# Patient Record
Sex: MALE | Race: WHITE | Employment: FULL TIME | URBAN - METROPOLITAN AREA
[De-identification: names, ages, dates, MRNs, and addresses within clinical notes are randomized per-mention and may not be internally consistent; named-entity substitution may affect disease eponyms.]

---

## 2019-10-31 ENCOUNTER — HOSPITAL ENCOUNTER (OUTPATIENT)
Age: 59
Discharge: HOME | End: 2019-11-02

## 2019-10-31 PROCEDURE — 88305 TISSUE EXAM BY PATHOLOGIST: CPT

## 2019-10-31 PROCEDURE — 88112 CYTOPATH CELL ENHANCE TECH: CPT

## 2019-12-23 ENCOUNTER — HOSPITAL ENCOUNTER (EMERGENCY)
Age: 59
Discharge: HOME | End: 2019-12-23
Attending: EMERGENCY MEDICINE
Payer: COMMERCIAL

## 2019-12-23 ENCOUNTER — APPOINTMENT (OUTPATIENT)
Dept: GENERAL RADIOLOGY | Age: 59
End: 2019-12-23
Payer: COMMERCIAL

## 2019-12-23 ENCOUNTER — APPOINTMENT (OUTPATIENT)
Dept: CT IMAGING | Age: 59
End: 2019-12-23
Payer: COMMERCIAL

## 2019-12-23 VITALS
TEMPERATURE: 97.1 F | BODY MASS INDEX: 35 KG/M2 | HEIGHT: 71 IN | SYSTOLIC BLOOD PRESSURE: 130 MMHG | RESPIRATION RATE: 16 BRPM | OXYGEN SATURATION: 96 % | DIASTOLIC BLOOD PRESSURE: 80 MMHG | WEIGHT: 250 LBS | HEART RATE: 73 BPM

## 2019-12-23 DIAGNOSIS — J18.9 PNEUMONIA DUE TO ORGANISM: Primary | ICD-10-CM

## 2019-12-23 DIAGNOSIS — R09.1 PLEURISY: ICD-10-CM

## 2019-12-23 LAB
ALBUMIN SERPL-MCNC: 4.2 G/DL (ref 3.5–5.2)
ALP BLD-CCNC: 67 U/L (ref 40–129)
ALT SERPL-CCNC: 7 U/L (ref 0–40)
ANION GAP SERPL CALCULATED.3IONS-SCNC: 14 MMOL/L (ref 7–16)
AST SERPL-CCNC: 21 U/L (ref 0–39)
BACTERIA: ABNORMAL /HPF
BASOPHILS ABSOLUTE: 0.05 E9/L (ref 0–0.2)
BASOPHILS RELATIVE PERCENT: 0.5 % (ref 0–2)
BILIRUB SERPL-MCNC: 0.3 MG/DL (ref 0–1.2)
BILIRUBIN URINE: NEGATIVE
BLOOD, URINE: ABNORMAL
BUN BLDV-MCNC: 17 MG/DL (ref 6–20)
CALCIUM SERPL-MCNC: 9.7 MG/DL (ref 8.6–10.2)
CHLORIDE BLD-SCNC: 97 MMOL/L (ref 98–107)
CLARITY: CLEAR
CO2: 25 MMOL/L (ref 22–29)
COLOR: YELLOW
CREAT SERPL-MCNC: 1.3 MG/DL (ref 0.7–1.2)
EOSINOPHILS ABSOLUTE: 0.3 E9/L (ref 0.05–0.5)
EOSINOPHILS RELATIVE PERCENT: 3 % (ref 0–6)
EPITHELIAL CELLS, UA: ABNORMAL /HPF
GFR AFRICAN AMERICAN: >60
GFR NON-AFRICAN AMERICAN: 57 ML/MIN/1.73
GLUCOSE BLD-MCNC: 104 MG/DL (ref 74–99)
GLUCOSE URINE: NEGATIVE MG/DL
HCT VFR BLD CALC: 39.3 % (ref 37–54)
HEMOGLOBIN: 12.4 G/DL (ref 12.5–16.5)
IMMATURE GRANULOCYTES #: 0.08 E9/L
IMMATURE GRANULOCYTES %: 0.8 % (ref 0–5)
KETONES, URINE: NEGATIVE MG/DL
LEUKOCYTE ESTERASE, URINE: NEGATIVE
LIPASE: 66 U/L (ref 13–60)
LYMPHOCYTES ABSOLUTE: 2.69 E9/L (ref 1.5–4)
LYMPHOCYTES RELATIVE PERCENT: 27.1 % (ref 20–42)
MCH RBC QN AUTO: 27.4 PG (ref 26–35)
MCHC RBC AUTO-ENTMCNC: 31.6 % (ref 32–34.5)
MCV RBC AUTO: 86.8 FL (ref 80–99.9)
MONOCYTES ABSOLUTE: 0.69 E9/L (ref 0.1–0.95)
MONOCYTES RELATIVE PERCENT: 7 % (ref 2–12)
NEUTROPHILS ABSOLUTE: 6.1 E9/L (ref 1.8–7.3)
NEUTROPHILS RELATIVE PERCENT: 61.6 % (ref 43–80)
NITRITE, URINE: NEGATIVE
PDW BLD-RTO: 12.9 FL (ref 11.5–15)
PH UA: 6 (ref 5–9)
PLATELET # BLD: 549 E9/L (ref 130–450)
PMV BLD AUTO: 9.3 FL (ref 7–12)
POTASSIUM REFLEX MAGNESIUM: 4.4 MMOL/L (ref 3.5–5)
PROTEIN UA: NEGATIVE MG/DL
RBC # BLD: 4.53 E12/L (ref 3.8–5.8)
RBC UA: ABNORMAL /HPF (ref 0–2)
SODIUM BLD-SCNC: 136 MMOL/L (ref 132–146)
SPECIFIC GRAVITY UA: <=1.005 (ref 1–1.03)
TOTAL PROTEIN: 7.9 G/DL (ref 6.4–8.3)
UROBILINOGEN, URINE: 0.2 E.U./DL
WBC # BLD: 9.9 E9/L (ref 4.5–11.5)
WBC UA: ABNORMAL /HPF (ref 0–5)

## 2019-12-23 PROCEDURE — 99285 EMERGENCY DEPT VISIT HI MDM: CPT

## 2019-12-23 PROCEDURE — 83690 ASSAY OF LIPASE: CPT

## 2019-12-23 PROCEDURE — 81001 URINALYSIS AUTO W/SCOPE: CPT

## 2019-12-23 PROCEDURE — 71275 CT ANGIOGRAPHY CHEST: CPT

## 2019-12-23 PROCEDURE — 6370000000 HC RX 637 (ALT 250 FOR IP): Performed by: STUDENT IN AN ORGANIZED HEALTH CARE EDUCATION/TRAINING PROGRAM

## 2019-12-23 PROCEDURE — 6360000004 HC RX CONTRAST MEDICATION: Performed by: RADIOLOGY

## 2019-12-23 PROCEDURE — 71045 X-RAY EXAM CHEST 1 VIEW: CPT

## 2019-12-23 PROCEDURE — 6360000002 HC RX W HCPCS: Performed by: STUDENT IN AN ORGANIZED HEALTH CARE EDUCATION/TRAINING PROGRAM

## 2019-12-23 PROCEDURE — 85025 COMPLETE CBC W/AUTO DIFF WBC: CPT

## 2019-12-23 PROCEDURE — 36415 COLL VENOUS BLD VENIPUNCTURE: CPT

## 2019-12-23 PROCEDURE — 80053 COMPREHEN METABOLIC PANEL: CPT

## 2019-12-23 PROCEDURE — 2580000003 HC RX 258: Performed by: STUDENT IN AN ORGANIZED HEALTH CARE EDUCATION/TRAINING PROGRAM

## 2019-12-23 PROCEDURE — 96374 THER/PROPH/DIAG INJ IV PUSH: CPT

## 2019-12-23 RX ORDER — CEFDINIR 300 MG/1
300 CAPSULE ORAL ONCE
Status: COMPLETED | OUTPATIENT
Start: 2019-12-23 | End: 2019-12-23

## 2019-12-23 RX ORDER — DOXYCYCLINE HYCLATE 100 MG/1
100 CAPSULE ORAL ONCE
Status: COMPLETED | OUTPATIENT
Start: 2019-12-23 | End: 2019-12-23

## 2019-12-23 RX ORDER — MORPHINE SULFATE 4 MG/ML
4 INJECTION, SOLUTION INTRAMUSCULAR; INTRAVENOUS ONCE
Status: COMPLETED | OUTPATIENT
Start: 2019-12-23 | End: 2019-12-23

## 2019-12-23 RX ORDER — HYDROCODONE BITARTRATE AND ACETAMINOPHEN 5; 325 MG/1; MG/1
1 TABLET ORAL EVERY 4 HOURS PRN
Qty: 30 TABLET | Refills: 0 | Status: SHIPPED | OUTPATIENT
Start: 2019-12-23 | End: 2019-12-28

## 2019-12-23 RX ORDER — CEFDINIR 250 MG/5ML
300 POWDER, FOR SUSPENSION ORAL ONCE
Status: DISCONTINUED | OUTPATIENT
Start: 2019-12-23 | End: 2019-12-23 | Stop reason: SDUPTHER

## 2019-12-23 RX ORDER — DOXYCYCLINE HYCLATE 100 MG
100 TABLET ORAL 2 TIMES DAILY
Qty: 20 TABLET | Refills: 0 | Status: SHIPPED | OUTPATIENT
Start: 2019-12-23 | End: 2020-01-02

## 2019-12-23 RX ORDER — 0.9 % SODIUM CHLORIDE 0.9 %
500 INTRAVENOUS SOLUTION INTRAVENOUS ONCE
Status: COMPLETED | OUTPATIENT
Start: 2019-12-23 | End: 2019-12-23

## 2019-12-23 RX ORDER — CEFDINIR 300 MG/1
300 CAPSULE ORAL 2 TIMES DAILY
Qty: 14 CAPSULE | Refills: 0 | Status: SHIPPED | OUTPATIENT
Start: 2019-12-23 | End: 2019-12-30

## 2019-12-23 RX ADMIN — SODIUM CHLORIDE 500 ML: 9 INJECTION, SOLUTION INTRAVENOUS at 04:29

## 2019-12-23 RX ADMIN — DOXYCYCLINE HYCLATE 100 MG: 100 CAPSULE ORAL at 06:14

## 2019-12-23 RX ADMIN — CEFDINIR 300 MG: 300 CAPSULE ORAL at 06:35

## 2019-12-23 RX ADMIN — IOPAMIDOL 60 ML: 755 INJECTION, SOLUTION INTRAVENOUS at 04:51

## 2019-12-23 RX ADMIN — MORPHINE SULFATE 4 MG: 4 INJECTION, SOLUTION INTRAMUSCULAR; INTRAVENOUS at 03:25

## 2019-12-23 ASSESSMENT — ENCOUNTER SYMPTOMS
SORE THROAT: 0
DIARRHEA: 0
PHOTOPHOBIA: 0
TROUBLE SWALLOWING: 0
BACK PAIN: 0
ABDOMINAL DISTENTION: 0
VOMITING: 0
NAUSEA: 0
WHEEZING: 0
STRIDOR: 0
SHORTNESS OF BREATH: 1
COLOR CHANGE: 0

## 2019-12-23 ASSESSMENT — PAIN DESCRIPTION - LOCATION: LOCATION: BACK

## 2019-12-23 ASSESSMENT — PAIN SCALES - GENERAL
PAINLEVEL_OUTOF10: 10
PAINLEVEL_OUTOF10: 9

## 2019-12-23 ASSESSMENT — PAIN DESCRIPTION - DESCRIPTORS: DESCRIPTORS: SHARP

## 2019-12-31 ENCOUNTER — HOSPITAL ENCOUNTER (OUTPATIENT)
Age: 59
Discharge: HOME | End: 2020-01-02
Payer: COMMERCIAL

## 2019-12-31 ENCOUNTER — HOSPITAL ENCOUNTER (OUTPATIENT)
Dept: GENERAL RADIOLOGY | Age: 59
Discharge: HOME | End: 2020-01-02
Payer: COMMERCIAL

## 2019-12-31 PROCEDURE — 71046 X-RAY EXAM CHEST 2 VIEWS: CPT

## 2020-01-17 ENCOUNTER — TELEPHONE (OUTPATIENT)
Dept: VASCULAR SURGERY | Age: 60
End: 2020-01-17

## 2020-01-17 ENCOUNTER — HOSPITAL ENCOUNTER (EMERGENCY)
Age: 60
Discharge: HOME | End: 2020-01-18
Attending: EMERGENCY MEDICINE
Payer: COMMERCIAL

## 2020-01-17 VITALS
TEMPERATURE: 97.5 F | SYSTOLIC BLOOD PRESSURE: 123 MMHG | RESPIRATION RATE: 18 BRPM | OXYGEN SATURATION: 94 % | DIASTOLIC BLOOD PRESSURE: 77 MMHG | HEART RATE: 61 BPM

## 2020-01-17 LAB
ALBUMIN SERPL-MCNC: 4.3 G/DL (ref 3.5–5.2)
ALP BLD-CCNC: 56 U/L (ref 40–129)
ALT SERPL-CCNC: 46 U/L (ref 0–40)
ANION GAP SERPL CALCULATED.3IONS-SCNC: 14 MMOL/L (ref 7–16)
APTT: 37.9 SEC (ref 24.5–35.1)
AST SERPL-CCNC: 71 U/L (ref 0–39)
BACTERIA: ABNORMAL /HPF
BASOPHILS ABSOLUTE: 0.02 E9/L (ref 0–0.2)
BASOPHILS RELATIVE PERCENT: 0.2 % (ref 0–2)
BILIRUB SERPL-MCNC: 0.5 MG/DL (ref 0–1.2)
BILIRUBIN URINE: NEGATIVE
BLOOD, URINE: ABNORMAL
BUN BLDV-MCNC: 16 MG/DL (ref 6–20)
CALCIUM SERPL-MCNC: 9.5 MG/DL (ref 8.6–10.2)
CHLORIDE BLD-SCNC: 93 MMOL/L (ref 98–107)
CLARITY: CLEAR
CO2: 22 MMOL/L (ref 22–29)
COLOR: ABNORMAL
CREAT SERPL-MCNC: 1.1 MG/DL (ref 0.7–1.2)
EOSINOPHILS ABSOLUTE: 0.06 E9/L (ref 0.05–0.5)
EOSINOPHILS RELATIVE PERCENT: 0.6 % (ref 0–6)
GFR AFRICAN AMERICAN: >60
GFR NON-AFRICAN AMERICAN: >60 ML/MIN/1.73
GLUCOSE BLD-MCNC: 93 MG/DL (ref 74–99)
GLUCOSE URINE: NEGATIVE MG/DL
HCT VFR BLD CALC: 36 % (ref 37–54)
HEMOGLOBIN: 11.6 G/DL (ref 12.5–16.5)
IMMATURE GRANULOCYTES #: 0.04 E9/L
IMMATURE GRANULOCYTES %: 0.4 % (ref 0–5)
INR BLD: 1
KETONES, URINE: NEGATIVE MG/DL
LEUKOCYTE ESTERASE, URINE: ABNORMAL
LYMPHOCYTES ABSOLUTE: 3.21 E9/L (ref 1.5–4)
LYMPHOCYTES RELATIVE PERCENT: 31.9 % (ref 20–42)
MCH RBC QN AUTO: 27.1 PG (ref 26–35)
MCHC RBC AUTO-ENTMCNC: 32.2 % (ref 32–34.5)
MCV RBC AUTO: 84.1 FL (ref 80–99.9)
MONOCYTES ABSOLUTE: 0.27 E9/L (ref 0.1–0.95)
MONOCYTES RELATIVE PERCENT: 2.7 % (ref 2–12)
NEUTROPHILS ABSOLUTE: 6.46 E9/L (ref 1.8–7.3)
NEUTROPHILS RELATIVE PERCENT: 64.2 % (ref 43–80)
NITRITE, URINE: NEGATIVE
PDW BLD-RTO: 13.1 FL (ref 11.5–15)
PH UA: 6.5 (ref 5–9)
PLATELET # BLD: 253 E9/L (ref 130–450)
PMV BLD AUTO: 11.1 FL (ref 7–12)
POTASSIUM SERPL-SCNC: 3.8 MMOL/L (ref 3.5–5)
PROTEIN UA: 30 MG/DL
PROTHROMBIN TIME: 11.5 SEC (ref 9.3–12.4)
RBC # BLD: 4.28 E12/L (ref 3.8–5.8)
RBC UA: >20 /HPF (ref 0–2)
SODIUM BLD-SCNC: 129 MMOL/L (ref 132–146)
SPECIFIC GRAVITY UA: <=1.005 (ref 1–1.03)
TOTAL PROTEIN: 7.4 G/DL (ref 6.4–8.3)
UROBILINOGEN, URINE: 0.2 E.U./DL
WBC # BLD: 10.1 E9/L (ref 4.5–11.5)
WBC UA: ABNORMAL /HPF (ref 0–5)

## 2020-01-17 PROCEDURE — 85730 THROMBOPLASTIN TIME PARTIAL: CPT

## 2020-01-17 PROCEDURE — 99283 EMERGENCY DEPT VISIT LOW MDM: CPT

## 2020-01-17 PROCEDURE — 36415 COLL VENOUS BLD VENIPUNCTURE: CPT

## 2020-01-17 PROCEDURE — 81001 URINALYSIS AUTO W/SCOPE: CPT

## 2020-01-17 PROCEDURE — 85610 PROTHROMBIN TIME: CPT

## 2020-01-17 PROCEDURE — 80053 COMPREHEN METABOLIC PANEL: CPT

## 2020-01-17 PROCEDURE — 85025 COMPLETE CBC W/AUTO DIFF WBC: CPT

## 2020-01-17 RX ORDER — SODIUM CHLORIDE 9 MG/ML
INJECTION, SOLUTION INTRAVENOUS CONTINUOUS
Status: DISCONTINUED | OUTPATIENT
Start: 2020-01-17 | End: 2020-01-18 | Stop reason: HOSPADM

## 2020-01-17 ASSESSMENT — PAIN SCALES - GENERAL
PAINLEVEL_OUTOF10: 2
PAINLEVEL_OUTOF10: 2

## 2020-01-17 ASSESSMENT — PAIN DESCRIPTION - DESCRIPTORS: DESCRIPTORS: DISCOMFORT

## 2020-01-17 ASSESSMENT — PAIN DESCRIPTION - LOCATION
LOCATION: ABDOMEN
LOCATION: ABDOMEN

## 2020-01-17 ASSESSMENT — PAIN DESCRIPTION - ORIENTATION
ORIENTATION: LOWER
ORIENTATION: LOWER

## 2020-01-17 ASSESSMENT — PAIN DESCRIPTION - FREQUENCY: FREQUENCY: CONTINUOUS

## 2020-01-18 ENCOUNTER — HOSPITAL ENCOUNTER (OUTPATIENT)
Age: 60
Discharge: HOME | End: 2020-01-18
Payer: COMMERCIAL

## 2020-01-18 LAB
ANION GAP SERPL CALCULATED.3IONS-SCNC: 14 MMOL/L (ref 7–16)
BUN BLDV-MCNC: 17 MG/DL (ref 6–20)
CALCIUM SERPL-MCNC: 10.4 MG/DL (ref 8.6–10.2)
CHLORIDE BLD-SCNC: 98 MMOL/L (ref 98–107)
CO2: 25 MMOL/L (ref 22–29)
CREAT SERPL-MCNC: 1.2 MG/DL (ref 0.7–1.2)
GFR AFRICAN AMERICAN: >60
GFR NON-AFRICAN AMERICAN: >60 ML/MIN/1.73
GLUCOSE BLD-MCNC: 120 MG/DL (ref 74–99)
HCT VFR BLD CALC: 39.1 % (ref 37–54)
HEMOGLOBIN: 12.7 G/DL (ref 12.5–16.5)
MCH RBC QN AUTO: 27.5 PG (ref 26–35)
MCHC RBC AUTO-ENTMCNC: 32.5 % (ref 32–34.5)
MCV RBC AUTO: 84.6 FL (ref 80–99.9)
PDW BLD-RTO: 13.1 FL (ref 11.5–15)
PLATELET # BLD: 272 E9/L (ref 130–450)
PMV BLD AUTO: 11.1 FL (ref 7–12)
POTASSIUM SERPL-SCNC: 4.4 MMOL/L (ref 3.5–5)
RBC # BLD: 4.62 E12/L (ref 3.8–5.8)
SODIUM BLD-SCNC: 137 MMOL/L (ref 132–146)
WBC # BLD: 6.7 E9/L (ref 4.5–11.5)

## 2020-01-18 PROCEDURE — 85027 COMPLETE CBC AUTOMATED: CPT

## 2020-01-18 PROCEDURE — 80048 BASIC METABOLIC PNL TOTAL CA: CPT

## 2020-01-18 PROCEDURE — 85520 HEPARIN ASSAY: CPT

## 2020-01-18 PROCEDURE — 36415 COLL VENOUS BLD VENIPUNCTURE: CPT

## 2020-01-18 NOTE — ED PROVIDER NOTES
HPI:  1/17/20, Time: 11:52 PM         Anthony Garzon is a 63 y.o. male presenting to the ED for hematuria. Patient had a nephrectomy done about 6 weeks ago. He is on Lovenox as he developed DVTs and PEs afterwards. He is been having hematuria for the past 3 days. Describes initial bright red blood, it is now tea colored with intermittent clots. He is able to empty his bladder without problem. He discussed this with his urologist at the [facility] who ordered outpatient labs to be done. He says his PCP was concerned and instructed him to present to the ER for evaluation. Denies any fever, chills, nausea, vomiting, lightheadedness, chest pain, shortness breath, dizziness, or any other symptoms. Review of Systems:   Pertinent positives and negatives are stated within HPI, all other systems reviewed and are negative.          --------------------------------------------- PAST HISTORY ---------------------------------------------  Past Medical History:  has no past medical history on file. Past Surgical History:  has no past surgical history on file. Social History:  reports that he quit smoking about 6 weeks ago. He has a 12.50 pack-year smoking history. He has never used smokeless tobacco. He reports previous alcohol use. He reports that he does not use drugs. Family History: family history is not on file. The patients home medications have been reviewed.     Allergies: Augmentin [amoxicillin-pot clavulanate]    -------------------------------------------------- RESULTS -------------------------------------------------  All laboratory and radiology results have been personally reviewed by myself   LABS:  Results for orders placed or performed during the hospital encounter of 01/17/20   CBC auto differential   Result Value Ref Range    WBC 10.1 4.5 - 11.5 E9/L    RBC 4.28 3.80 - 5.80 E12/L    Hemoglobin 11.6 (L) 12.5 - 16.5 g/dL    Hematocrit 36.0 (L) 37.0 - 54.0 %    MCV 84.1 80.0 - 99.9 fL Bacteria, UA RARE (A) /HPF       RADIOLOGY:  Interpreted by Radiologist.  No orders to display       ------------------------- NURSING NOTES AND VITALS REVIEWED ---------------------------   The nursing notes within the ED encounter and vital signs as below have been reviewed. /77   Pulse 61   Temp 97.5 °F (36.4 °C) (Oral)   Resp 18   SpO2 94%   Oxygen Saturation Interpretation: Normal      ---------------------------------------------------PHYSICAL EXAM--------------------------------------      Constitutional/General: Alert and oriented x3, well appearing, non toxic in NAD  Head: Normocephalic and atraumatic  Eyes: PERRL, EOMI  Mouth: Oropharynx clear, handling secretions, no trismus  Neck: Supple, full ROM,   Pulmonary: Lungs clear to auscultation bilaterally, no wheezes, rales, or rhonchi. Not in respiratory distress  Cardiovascular:  Regular rate and rhythm, no murmurs, gallops, or rubs. 2+ distal pulses  Abdomen: Soft, non tender, non distended,   Extremities: Moves all extremities x 4. Warm and well perfused  Skin: warm and dry without rash  Neurologic: GCS 15,  Psych: Normal Affect      ------------------------------ ED COURSE/MEDICAL DECISION MAKING----------------------  Medications   0.9 % sodium chloride infusion (has no administration in time range)         ED COURSE:       Medical Decision Making:    Labs reviewed. Reevaluation, patient's resting. Hemodynamically stable, no symptoms or complaints. Discussed the findings with him, I encouraged him to have his labs rechecked tomorrow morning. He will ensure this is done. He is educated on signs and symptoms that require emergent evaluation. Counseling: The emergency provider has spoken with the patient and discussed todays results, in addition to providing specific details for the plan of care and counseling regarding the diagnosis and prognosis.   Questions are answered at this time and they are agreeable with the plan.      --------------------------------- IMPRESSION AND DISPOSITION ---------------------------------    IMPRESSION  1. Hematuria, unspecified type        DISPOSITION  Disposition: Discharge to home  Patient condition is stable      NOTE: This report was transcribed using voice recognition software.  Every effort was made to ensure accuracy; however, inadvertent computerized transcription errors may be present        Beto Bermudez MD  01/17/20 [id number]

## 2020-01-20 LAB — ANTI-XA LMW HEPARIN: 1.87 U/ML (ref 0.5–1.1)

## 2020-02-25 ENCOUNTER — OFFICE VISIT (OUTPATIENT)
Dept: VASCULAR SURGERY | Age: 60
End: 2020-02-25
Payer: COMMERCIAL

## 2020-02-25 VITALS — HEART RATE: 76 BPM | DIASTOLIC BLOOD PRESSURE: 74 MMHG | SYSTOLIC BLOOD PRESSURE: 134 MMHG

## 2020-02-25 PROCEDURE — 99203 OFFICE O/P NEW LOW 30 MIN: CPT | Performed by: SURGERY

## 2020-02-25 NOTE — PROGRESS NOTES
Vascular Surgery Outpatient Consultation      Chief Complaint   Patient presents with    Surgical Consult     DVT (R) leg, PE following removal left kidney . Reason for Consult: DVT    Requesting Physician:  Dr. Yi Salas:                The patient is a 63 y.o. male who is referred for evaluation of DVT. The patient recently underwent left nephrectomy for carcinoma. Postoperatively he developed DVT and PE. He was initially treated with Lovenox but had persistent bleeding and was converted to Xarelto which she has been tolerating. He states that his leg swelling has improved. Past Medical History:        Diagnosis Date    Cancer of kidney ([facility]     left    DVT of leg (deep venous thrombosis) (HCC)     right    Prostate cancer ([facility] [facility]     Solitary kidney     Testicular cancer ([facility] [address]      Past Surgical History:        Procedure Laterality Date    COLONOSCOPY      SHOULDER SURGERY      TESTICLE REMOVAL Right     WISDOM TOOTH EXTRACTION       Current Medications:   Prior to Admission medications    Medication Sig Start Date End Date Taking? Authorizing Provider   rivaroxaban (XARELTO) 20 MG TABS tablet Take 20 mg by mouth   Yes Historical Provider, MD   atorvastatin (LIPITOR) 20 MG tablet Take 20 mg by mouth daily   Yes Historical Provider, MD   gabapentin (NEURONTIN) 600 MG tablet Take 600 mg by mouth.    Yes Historical Provider, MD   omeprazole (PRILOSEC) 40 MG delayed release capsule Take 40 mg by mouth daily   Yes Historical Provider, MD   calcium carbonate (OSCAL) 500 MG TABS tablet Take 500 mg by mouth daily   Yes Historical Provider, MD   Cholecalciferol (VITAMIN D) 50 MCG (2000 UT) CAPS capsule Take by mouth   Yes Historical Provider, MD   enoxaparin (LOVENOX) 120 MG/0.8ML injection Inject 1 mg/kg into the skin every 12 hours    Historical Provider, MD     Allergies:  Augmentin [amoxicillin-pot clavulanate]    Social History     Socioeconomic History    masses or organomegaly  Musculoskeletal: normal range of motion, no joint swelling, deformity or tenderness  Neurologic: no cranial nerve deficit, gait, coordination and speech normal  Extremities: mild leg edema bilaterally    PULSE EXAM      Right      Left   Brachial     Radial     Femoral     Popliteal     Dorsalis Pedis 2 2   Posterior Tibial     (3=normal, 2=diminished, 1=barely palpable, 4=widened)      Problem List Items Addressed This Visit     None      Visit Diagnoses     Acute deep vein thrombosis (DVT) of femoral vein of right lower extremity ([address]    -  Primary            I reviewed with the patient and his wife that I agree with the current treatment with anticoagulation. As he is tolerating Xarelto, I feel that it is reasonable to continue this. I will plan to see him again in 5 months at which point we can discuss continuing therapy longer or completion. I asked him to call sooner with any problems. No follow-ups on file.

## 2020-07-21 ENCOUNTER — OFFICE VISIT (OUTPATIENT)
Dept: VASCULAR SURGERY | Age: 60
End: 2020-07-21
Payer: COMMERCIAL

## 2020-07-21 VITALS — RESPIRATION RATE: 16 BRPM | HEIGHT: 71 IN | WEIGHT: 252 LBS | BODY MASS INDEX: 35.28 KG/M2

## 2020-07-21 PROCEDURE — 99213 OFFICE O/P EST LOW 20 MIN: CPT | Performed by: SURGERY

## 2020-07-21 RX ORDER — ASPIRIN 81 MG/1
81 TABLET ORAL DAILY
Qty: 30 TABLET | Refills: 5 | COMMUNITY
Start: 2020-07-21

## 2020-07-21 NOTE — PROGRESS NOTES
Vascular Surgery Outpatient Progress Note      Chief Complaint   Patient presents with    Follow-up     Acute DVT       HISTORY OF PRESENT ILLNESS:                The patient is a 62 y.o. male who returns for follow-up evaluation of right lower extremity DVT. The patient states that he continues on Xarelto. He denies any new swelling in his right leg. He does state occasional swelling in his left leg especially after sitting all day. He is now working from home. He denies any recurrence or problems following his renal cancer. Past Medical History:        Diagnosis Date    Anemia     Cancer of kidney ([facility] [address]     left    DVT of leg (deep venous thrombosis) (HCC)     right    Prostate cancer ([facility] [address]     Solitary kidney     [facility]      Past Surgical History:        Procedure Laterality Date    COLONOSCOPY      SHOULDER SURGERY      TESTICLE REMOVAL Right     WISDOM TOOTH EXTRACTION       Current Medications:   Prior to Admission medications    Medication Sig Start Date End Date Taking? Authorizing Provider   aspirin EC 81 MG EC tablet Take 1 tablet by mouth daily 7/21/20  Yes Slava Dixon MD   atorvastatin (LIPITOR) 20 MG tablet Take 20 mg by mouth daily   Yes Historical Provider, MD   gabapentin (NEURONTIN) 600 MG tablet Take 600 mg by mouth.    Yes Historical Provider, MD   omeprazole (PRILOSEC) 40 MG delayed release capsule Take 40 mg by mouth daily   Yes Historical Provider, MD   enoxaparin (LOVENOX) 120 MG/0.8ML injection Inject 1 mg/kg into the skin every 12 hours   Yes Historical Provider, MD   calcium carbonate (OSCAL) 500 MG TABS tablet Take 500 mg by mouth daily   Yes Historical Provider, MD   Cholecalciferol (VITAMIN D) 50 MCG (2000 UT) CAPS capsule Take by mouth   Yes Historical Provider, MD     Allergies:  Augmentin [amoxicillin-pot clavulanate]    Social History     Socioeconomic History    Marital status:      Spouse name: Not on file    Number of children: Not on file    Years of education: Not on file    Highest education level: Not on file   Occupational History    Not on file   Social Needs    Financial resource strain: Not on file    Food insecurity     Worry: Not on file     Inability: Not on file    Transportation needs     Medical: Not on file     Non-medical: Not on file   Tobacco Use    Smoking status: Former Smoker     Packs/day: 0.50     Years: 25.00     Pack years: 12.50     Types: Cigarettes     Last attempt to quit: 2019     Years since quittin.6    Smokeless tobacco: Never Used   Substance and Sexual Activity    Alcohol use: Not Currently    Drug use: Never    Sexual activity: Not Currently   Lifestyle    Physical activity     Days per week: Not on file     Minutes per session: Not on file    Stress: Not on file   Relationships    Social connections     Talks on phone: Not on file     Gets together: Not on file     Attends [employer] service: Not on file     Active member of club or organization: Not on file     Attends meetings of clubs or organizations: Not on file     Relationship status: Not on file    Intimate partner violence     Fear of current or ex partner: Not on file     Emotionally abused: Not on file     Physically abused: Not on file     Forced sexual activity: Not on file   Other Topics Concern    Not on file   Social History Narrative    Not on file        History reviewed. No pertinent family history.     REVIEW OF SYSTEMS (New symptoms):    Eyes:      Blurred vision:  No [x]/Yes []               Diplopia:   No [x]/Yes []               Vision loss:       No [x]/Yes []   Ears, nose, throat:             Hearing loss:    No [x]/Yes []      Vertigo:   No [x]/Yes []                       Swallowing problem:  No [x]/Yes []               Nose bleeds:   No [x]/Yes []      Voice hoarseness:  No [x]/Yes []  Respiratory:             Cough:   No [x]/Yes []      Pleuritic chest pain:  No [x]/Yes []                        Dyspnea: No [x]/Yes []      Wheezing:   No [x]/Yes []  Cardiovascular:             Angina:   No [x]/Yes []      Palpitations:   No [x]/Yes []          Claudication:    No [x]/Yes []      Leg swelling:   No [x]/Yes []  Gastrointestinal:             Nausea or vomiting:  No [x]/Yes []               Abdominal pain:  No [x]/Yes []                     Intestinal bleeding: No [x]/Yes []  Musculoskeletal:             Leg pain:   No [x]/Yes []      Back pain:   No [x]/Yes []                    Weakness:   No [x]/Yes []  Neurologic:             Numbness:   No [x]/Yes []      Paralysis:   No [x]/Yes []                       Headaches:   No [x]/Yes []  Hematologic, lymphatic:   Anemia:   No [x]/Yes []              Bleeding or bruising:  No [x]/Yes []              Fevers or chills: No [x]/Yes []  Endocrine:             Temp intolerance:   No [x]/Yes []                       Polydipsia, polyuria:  No [x]/Yes []  Skin:              Rash:    No [x]/Yes []      Ulcers:   No [x]/Yes []              Abnorm pigment: No [x]/Yes []  :              Frequency/urgency:  No [x]/Yes []      Hematuria:    No [x]/Yes []                      Incontinence:    No [x]/Yes []    PHYSICAL EXAM:  Vitals:    07/21/20 1338   Resp: 16     General Appearance: alert and oriented to person, place and time, in no acute distress, well developed and well- nourished  Neurologic: no cranial nerve deficit, speech normal  Head: normocephalic and atraumatic  Eyes: extraocular eye movements intact, conjunctivae normal  ENT: external ear and ear canal normal bilaterally, nose without deformity, no carotid bruits  Pulmonary/Chest: normal air movement, no respiratory distress  Cardiovascular: normal rate, regular rhythm, no murmur  Abdomen: non-distended, no masses  Musculoskeletal: no joint deformity or tenderness  Extremities: mild leg edema bilaterally  Skin: warm and dry, no rash or erythema    PULSE EXAM      Right      Left   Brachial     Radial     Femoral

## 2020-08-24 ENCOUNTER — HOSPITAL ENCOUNTER (OUTPATIENT)
Age: 60
Discharge: HOME | End: 2020-08-24
Payer: COMMERCIAL

## 2020-08-24 LAB
BACTERIA: ABNORMAL /HPF
BILIRUBIN URINE: NEGATIVE
BLOOD, URINE: NEGATIVE
CLARITY: CLEAR
COLOR: YELLOW
GLUCOSE URINE: NEGATIVE MG/DL
KETONES, URINE: NEGATIVE MG/DL
LEUKOCYTE ESTERASE, URINE: ABNORMAL
NITRITE, URINE: NEGATIVE
PH UA: 5.5 (ref 5–9)
PROTEIN UA: NEGATIVE MG/DL
RBC UA: ABNORMAL /HPF (ref 0–2)
SPECIFIC GRAVITY UA: <=1.005 (ref 1–1.03)
UROBILINOGEN, URINE: 0.2 E.U./DL
WBC UA: ABNORMAL /HPF (ref 0–5)

## 2020-08-24 PROCEDURE — 87088 URINE BACTERIA CULTURE: CPT

## 2020-08-24 PROCEDURE — 81001 URINALYSIS AUTO W/SCOPE: CPT

## 2020-08-26 LAB — URINE CULTURE, ROUTINE: NORMAL

## 2020-09-03 ENCOUNTER — APPOINTMENT (OUTPATIENT)
Dept: GENERAL RADIOLOGY | Age: 60
End: 2020-09-03
Payer: COMMERCIAL

## 2020-09-03 ENCOUNTER — APPOINTMENT (OUTPATIENT)
Dept: CT IMAGING | Age: 60
End: 2020-09-03
Payer: COMMERCIAL

## 2020-09-03 ENCOUNTER — TELEPHONE (OUTPATIENT)
Dept: ADMINISTRATIVE | Age: 60
End: 2020-09-03

## 2020-09-03 ENCOUNTER — HOSPITAL ENCOUNTER (EMERGENCY)
Age: 60
Discharge: HOME | End: 2020-09-03
Attending: EMERGENCY MEDICINE
Payer: COMMERCIAL

## 2020-09-03 ENCOUNTER — APPOINTMENT (OUTPATIENT)
Dept: NUCLEAR MEDICINE | Age: 60
End: 2020-09-03
Payer: COMMERCIAL

## 2020-09-03 VITALS
SYSTOLIC BLOOD PRESSURE: 147 MMHG | WEIGHT: 255 LBS | OXYGEN SATURATION: 98 % | TEMPERATURE: 97.9 F | HEIGHT: 71 IN | BODY MASS INDEX: 35.7 KG/M2 | RESPIRATION RATE: 16 BRPM | HEART RATE: 58 BPM | DIASTOLIC BLOOD PRESSURE: 88 MMHG

## 2020-09-03 LAB
ANION GAP SERPL CALCULATED.3IONS-SCNC: 11 MMOL/L (ref 7–16)
APTT: 27.9 SEC (ref 24.5–35.1)
BASOPHILS ABSOLUTE: 0.05 E9/L (ref 0–0.2)
BASOPHILS RELATIVE PERCENT: 0.6 % (ref 0–2)
BUN BLDV-MCNC: 18 MG/DL (ref 6–20)
CALCIUM SERPL-MCNC: 9.4 MG/DL (ref 8.6–10.2)
CHLORIDE BLD-SCNC: 105 MMOL/L (ref 98–107)
CO2: 26 MMOL/L (ref 22–29)
CREAT SERPL-MCNC: 1.4 MG/DL (ref 0.7–1.2)
EKG ATRIAL RATE: 101 BPM
EKG ATRIAL RATE: 54 BPM
EKG P AXIS: 55 DEGREES
EKG P-R INTERVAL: 148 MS
EKG Q-T INTERVAL: 292 MS
EKG Q-T INTERVAL: 432 MS
EKG QRS DURATION: 132 MS
EKG QRS DURATION: 136 MS
EKG QTC CALCULATION (BAZETT): 409 MS
EKG QTC CALCULATION (BAZETT): 412 MS
EKG R AXIS: -47 DEGREES
EKG R AXIS: -71 DEGREES
EKG T AXIS: 3 DEGREES
EKG T AXIS: 6 DEGREES
EKG VENTRICULAR RATE: 120 BPM
EKG VENTRICULAR RATE: 54 BPM
EOSINOPHILS ABSOLUTE: 0.28 E9/L (ref 0.05–0.5)
EOSINOPHILS RELATIVE PERCENT: 3.4 % (ref 0–6)
GFR AFRICAN AMERICAN: >60
GFR NON-AFRICAN AMERICAN: 52 ML/MIN/1.73
GLUCOSE BLD-MCNC: 106 MG/DL (ref 74–99)
HCT VFR BLD CALC: 41.2 % (ref 37–54)
HEMOGLOBIN: 13.6 G/DL (ref 12.5–16.5)
IMMATURE GRANULOCYTES #: 0.03 E9/L
IMMATURE GRANULOCYTES %: 0.4 % (ref 0–5)
INR BLD: 0.9
LACTIC ACID: 1.1 MMOL/L (ref 0.5–2.2)
LYMPHOCYTES ABSOLUTE: 2.79 E9/L (ref 1.5–4)
LYMPHOCYTES RELATIVE PERCENT: 34 % (ref 20–42)
MCH RBC QN AUTO: 27.9 PG (ref 26–35)
MCHC RBC AUTO-ENTMCNC: 33 % (ref 32–34.5)
MCV RBC AUTO: 84.4 FL (ref 80–99.9)
MONOCYTES ABSOLUTE: 0.66 E9/L (ref 0.1–0.95)
MONOCYTES RELATIVE PERCENT: 8 % (ref 2–12)
NEUTROPHILS ABSOLUTE: 4.39 E9/L (ref 1.8–7.3)
NEUTROPHILS RELATIVE PERCENT: 53.6 % (ref 43–80)
PDW BLD-RTO: 14 FL (ref 11.5–15)
PLATELET # BLD: 232 E9/L (ref 130–450)
PMV BLD AUTO: 10.2 FL (ref 7–12)
POTASSIUM REFLEX MAGNESIUM: 4.2 MMOL/L (ref 3.5–5)
PRO-BNP: 289 PG/ML (ref 0–125)
PROTHROMBIN TIME: 10.8 SEC (ref 9.3–12.4)
RBC # BLD: 4.88 E12/L (ref 3.8–5.8)
SARS-COV-2, NAAT: NOT DETECTED
SODIUM BLD-SCNC: 142 MMOL/L (ref 132–146)
TROPONIN: <0.01 NG/ML (ref 0–0.03)
TSH SERPL DL<=0.05 MIU/L-ACNC: 3.2 UIU/ML (ref 0.27–4.2)
WBC # BLD: 8.2 E9/L (ref 4.5–11.5)

## 2020-09-03 PROCEDURE — 2500000003 HC RX 250 WO HCPCS: Performed by: EMERGENCY MEDICINE

## 2020-09-03 PROCEDURE — 85610 PROTHROMBIN TIME: CPT

## 2020-09-03 PROCEDURE — 80048 BASIC METABOLIC PNL TOTAL CA: CPT

## 2020-09-03 PROCEDURE — 96366 THER/PROPH/DIAG IV INF ADDON: CPT

## 2020-09-03 PROCEDURE — 99245 OFF/OP CONSLTJ NEW/EST HI 55: CPT | Performed by: INTERNAL MEDICINE

## 2020-09-03 PROCEDURE — 96375 TX/PRO/DX INJ NEW DRUG ADDON: CPT

## 2020-09-03 PROCEDURE — 3430000000 HC RX DIAGNOSTIC RADIOPHARMACEUTICAL: Performed by: RADIOLOGY

## 2020-09-03 PROCEDURE — 85025 COMPLETE CBC W/AUTO DIFF WBC: CPT

## 2020-09-03 PROCEDURE — 96365 THER/PROPH/DIAG IV INF INIT: CPT

## 2020-09-03 PROCEDURE — 93010 ELECTROCARDIOGRAM REPORT: CPT | Performed by: INTERNAL MEDICINE

## 2020-09-03 PROCEDURE — 83880 ASSAY OF NATRIURETIC PEPTIDE: CPT

## 2020-09-03 PROCEDURE — 84484 ASSAY OF TROPONIN QUANT: CPT

## 2020-09-03 PROCEDURE — 85730 THROMBOPLASTIN TIME PARTIAL: CPT

## 2020-09-03 PROCEDURE — 93005 ELECTROCARDIOGRAM TRACING: CPT | Performed by: EMERGENCY MEDICINE

## 2020-09-03 PROCEDURE — A9558 XE133 XENON 10MCI: HCPCS | Performed by: RADIOLOGY

## 2020-09-03 PROCEDURE — 84443 ASSAY THYROID STIM HORMONE: CPT

## 2020-09-03 PROCEDURE — U0002 COVID-19 LAB TEST NON-CDC: HCPCS

## 2020-09-03 PROCEDURE — 78582 LUNG VENTILAT&PERFUS IMAGING: CPT

## 2020-09-03 PROCEDURE — 71045 X-RAY EXAM CHEST 1 VIEW: CPT

## 2020-09-03 PROCEDURE — A9540 TC99M MAA: HCPCS | Performed by: RADIOLOGY

## 2020-09-03 PROCEDURE — 96376 TX/PRO/DX INJ SAME DRUG ADON: CPT

## 2020-09-03 PROCEDURE — 83605 ASSAY OF LACTIC ACID: CPT

## 2020-09-03 PROCEDURE — 99284 EMERGENCY DEPT VISIT MOD MDM: CPT

## 2020-09-03 RX ORDER — METOPROLOL SUCCINATE 25 MG/1
25 TABLET, EXTENDED RELEASE ORAL DAILY
Qty: 30 TABLET | Refills: 0 | Status: SHIPPED | OUTPATIENT
Start: 2020-09-03 | End: 2020-09-14 | Stop reason: SDUPTHER

## 2020-09-03 RX ORDER — DILTIAZEM HYDROCHLORIDE 5 MG/ML
20 INJECTION INTRAVENOUS ONCE
Status: COMPLETED | OUTPATIENT
Start: 2020-09-03 | End: 2020-09-03

## 2020-09-03 RX ORDER — XENON XE-133 10 MCI/1
20 GAS RESPIRATORY (INHALATION)
Status: COMPLETED | OUTPATIENT
Start: 2020-09-03 | End: 2020-09-03

## 2020-09-03 RX ADMIN — DILTIAZEM HYDROCHLORIDE 20 MG: 5 INJECTION INTRAVENOUS at 08:36

## 2020-09-03 RX ADMIN — DEXTROSE MONOHYDRATE 5 MG/HR: 50 INJECTION, SOLUTION INTRAVENOUS at 08:44

## 2020-09-03 RX ADMIN — XENON XE-133 20 MILLICURIE: 10 GAS RESPIRATORY (INHALATION) at 11:18

## 2020-09-03 RX ADMIN — Medication 6 MILLICURIE: at 11:17

## 2020-09-03 ASSESSMENT — ENCOUNTER SYMPTOMS
WHEEZING: 0
COLOR CHANGE: 0
VOMITING: 0
SHORTNESS OF BREATH: 1
BACK PAIN: 0
STRIDOR: 0
ABDOMINAL PAIN: 0
SINUS PAIN: 0
CHEST TIGHTNESS: 0
RHINORRHEA: 0
NAUSEA: 0
CHOKING: 0
COUGH: 0

## 2020-09-03 NOTE — TELEPHONE ENCOUNTER
Pt was in [facility] ER for a fib 9/3 and states Dr. Kayla Barillas wanted to see him in follow up. Please call pt with recommendations/appts.

## 2020-09-03 NOTE — CONSULTS
INPATIENT CARDIOLOGY CONSULT    Name: Senait Ray    Age: 62 y.o. Date of Admission: 9/3/2020  8:01 AM    Date of Service: 9/3/2020    Reason for Consultation: Atrial fibrillation with RVR    Referring Physician: Dr. Ahsan Harding    History of Present Illness:  Senait Ray is a 62 y.o. male (new to [facility]) Cardiology -- Dr. Marina Chun) who presented to the [facility] emergency department on 9/3/2020 for further evaluation of palpitations and atrial fibrillation with RVR. His PMH is outlined in detail below (see A/P below). He is typically active with no complaints of chest pain, SOB, lightheadedness, dizziness, or syncope. No history of PND or orthopnea. +long-standing history of occasional palpitations. +recent hematuria. +history of multiple urological malignancies -- currently undergoing evaluation for bladder mass --> he presented to [facility] today for cystoscopy/bipsy and pre-procedure evaluation demonstrated atrial fibrillation with RVR --> he was transferred to [facility] ER for further evaluation --> he spontaneously converted to SR after receiving IV cardizem. He is currently with no active cardiac complaints at rest. SR at rate 60's on telemetry.     Review of Systems:   Cardiac: As per HPI  General: No fever, chills  Pulmonary: As per HPI  HEENT: No visual disturbances, difficult swallowing  GI: No nausea, vomiting  Endocrine: No thyroid disease or DM  Musculoskeletal: VALERA x 4, no focal motor deficits  Skin: Intact, no rashes  Neuro/Psych: No headache or seizures    Past Medical History:  Past Medical History:   Diagnosis Date    Anemia     Cancer of kidney ([address]     left    DVT of leg (deep venous thrombosis) (HCC)     right    Prostate cancer ([facility] [address] [facility]     Solitary kidney     Testicular cancer ([facility] [address]        Past Surgical History:  Past Surgical History:   Procedure Laterality Date    COLONOSCOPY      SHOULDER SURGERY      TESTICLE REMOVAL Right     WISDOM TOOTH EXTRACTION       Family History:  History reviewed. No pertinent family history. No [address] of premature CAD, SCD, or arrhythmia    Social History:  Social History     Socioeconomic History    Marital status:      Spouse name: Not on file    Number of children: Not on file    Years of education: Not on file    Highest education level: Not on file   Occupational History    Not on file   Social Needs    Financial resource strain: Not on file    Food insecurity     Worry: Not on file     Inability: Not on file    Transportation needs     Medical: Not on file     Non-medical: Not on file   Tobacco Use    Smoking status: Former Smoker     Packs/day: 0.50     Years: 25.00     Pack years: 12.50     Types: Cigarettes     Last attempt to quit: 2019     Years since quittin.7    Smokeless tobacco: Never Used   Substance and Sexual Activity    Alcohol use: Not Currently    Drug use: Never    Sexual activity: Not Currently   Lifestyle    Physical activity     Days per week: Not on file     Minutes per session: Not on file    Stress: Not on file   Relationships    Social connections     Talks on phone: Not on file     Gets together: Not on file     Attends Orthodoxy service: Not on file     Active member of club or organization: Not on file     Attends meetings of clubs or organizations: Not on file     Relationship status: Not on file    Intimate partner violence     Fear of current or ex partner: Not on file     Emotionally abused: Not on file     Physically abused: Not on file     Forced sexual activity: Not on file   Other Topics Concern    Not on file   Social History Narrative    Not on file       Allergies: Allergies   Allergen Reactions    Augmentin [Amoxicillin-Pot Clavulanate]        Home Medications:  Prior to Admission medications    Medication Sig Start Date End Date Taking?  Authorizing Provider   metoprolol succinate (TOPROL XL) 25 MG extended release tablet Take 1 tablet by mouth daily 9/3/20  Yes Shyanne Stewart EOMI, no conjunctival erythema  ENMT: No pharyngeal erythema, MMM, no rhinorrhea  Neck: Supple, no elevated JVP, no carotid bruits  Lungs: Clear to auscultation bilaterally. No wheezes, rales, or rhonchi. Cardiac: Regular rate and rhythm, +S1S2, no murmurs apparent  Abdomen: Soft, nontender, +bowel sounds  Extremities: Moves all extremities x 4, no lower extremity edema  Neurologic: No focal motor deficits apparent, normal mood and affect  Peripheral Pulses: Intact posterior tibial pulses bilaterally    Intake/Output:  No intake or output data in the 24 hours ending 09/03/20 1826  No intake/output data recorded.     Laboratory Tests:  Recent Labs     09/03/20 0822      K 4.2      CO2 26   BUN 18   CREATININE 1.4*   GLUCOSE 106*   CALCIUM 9.4     Lab Results   Component Value Date    MG 1.6 07/06/2011     Recent Labs     09/03/20  0822   WBC 8.2   RBC 4.88   HGB 13.6   HCT 41.2   MCV 84.4   MCH 27.9   MCHC 33.0   RDW 14.0      MPV 10.2     Lab Results   Component Value Date    CKTOTAL 71 08/26/2011    CKMB 1.8 08/26/2011    TROPONINI <0.01 09/03/2020    TROPONINI 0.02 07/06/2011     Lab Results   Component Value Date    INR 0.9 09/03/2020    INR 1.0 01/17/2020    PROTIME 10.8 09/03/2020    PROTIME 11.5 01/17/2020     Lab Results   Component Value Date    TSH 3.200 09/03/2020     Lab Results   Component Value Date    CHOL 169 08/08/2011    CHOL 222 (H) 07/07/2011    CHOL 256 (H) 06/13/2011     Lab Results   Component Value Date    TRIG 191 (H) 08/08/2011    TRIG 112 07/07/2011    TRIG 85 06/13/2011     Lab Results   Component Value Date    HDL 33.0 (A) 08/08/2011    HDL 70.0 07/07/2011    HDL 92.0 06/13/2011     Lab Results   Component Value Date    LDLCALC 98 08/08/2011    LDLCALC 130 (H) 07/07/2011    LDLCALC 147 (H) 06/13/2011     No results found for: LABVLDL, VLDL  No results found for: CHOLHDLRATIO  Recent Labs     09/03/20  0822   PROBNP 289*       Cardiac Tests:  ECG (9/3/2020): atrial fibrillation with RVR, RBBB, LAD    Repeat EKG (9/3/2020): SB, rate 54, RBBB, LAFB    Telemetry findings reviewed: SR, rate 60's    V/Q scan: 9/3/2020  Negative study    ASSESSMENT / PLAN:  1. PAF / atrial fibrillation with RVR -- spontaneously converted to SR, normal TSH, [id number] score 0 or 1 (depending on presence of HTN -- \"was told my blood pressure was high 10 years ago, but it's been fine since then\"; BP typically controlled on no anti-HTN agents as an outpatient)  2. Hematuria -- work-up ongoing  3. Bladder mass -- cystoscopy/biopsy cancelled today (in the setting of #1)  4. Renal cell CA s/p surgery at [facility] in 12/2019  5. Prostate cancer s/p radiation treatments ~ 5 years ago  10. Testicular cancer s/p surgery in 1992  7. History of DVT/PE post-op following surgery for renal cell CA in 12/2019 (s/p treatment with xarelto) -- negative V/Q scan this admission  8. Renal insufficiency -- Cr 1.4  9. HLD -- on statin  10. BMI 35.6    - Add Toprol XL 25 mg daily  - R/B/A/I for [address] discussed --> OAC not added at this time  - Outpatient echocardiogram  - Outpatient sleep study if no prior study  - Home BP monitoring  - Ok for cystoscopy and biopsy from a cardiology standpoint  - Case discussed with ER staff today    Thank you for allowing me to participate in your patient's care. Please feel free to contact me if you have any questions or concerns.     Laura Moody MD  [facility]) Cardiology

## 2020-09-03 NOTE — ED NOTES
Pt converted to SB. EKG done and cardizem stopped per Dr Geovanny Schultz.       Sharmin Shook, RN  09/03/20 [id number]

## 2020-09-03 NOTE — ED PROVIDER NOTES
Tess Zepeda is a 62 y.o. male presenting to the ED for palpitations, shortness of breath, beginning prior to arrival ago. The complaint has been persistent, moderate in severity, and worsened by nothing. 63 yo m w history of Bladder CA, and pulmonary embolism was on xarelto until 12 days ago who f/w dr Rhiannon Garcias urology. Pt notes he was at [facility] today and was found to be in afib rvr. Pt notes no history of this and notes sob now. Unable to determine how long pt has been in AF. He notes no chest pain, abd pain, f,c,s, cough or cold sx    Review of Systems:   Review of Systems   Constitutional: Negative for activity change and fatigue. HENT: Negative for congestion, rhinorrhea and sinus pain. Respiratory: Positive for shortness of breath. Negative for cough, choking, chest tightness, wheezing and stridor. Cardiovascular: Positive for palpitations and leg swelling. Negative for chest pain. Gastrointestinal: Negative for abdominal pain, nausea and vomiting. Endocrine: Negative for polyuria. Genitourinary: Negative for dysuria, enuresis and flank pain. Musculoskeletal: Negative for back pain and neck pain. Skin: Negative for color change and pallor. Allergic/Immunologic: Negative for immunocompromised state. Neurological: Negative for light-headedness, numbness and headaches. Hematological: Negative for adenopathy. Does not bruise/bleed easily. Psychiatric/Behavioral: Negative for agitation.                 --------------------------------------------- PAST HISTORY ---------------------------------------------  Past Medical History:  has a past medical history of Anemia, Cancer of kidney ([facility] [facility]deep [facility] Prostate [facility] ([facility] Solitary kidney, and Testicular cancer ([facility] Past Surgical History:  has a past surgical history that includes Testicle removal (Right); shoulder surgery; [place] tooth extraction; and Colonoscopy.     Social History: reports that he quit smoking about 9 months ago. His smoking use included cigarettes. He has a 12.50 pack-year smoking history. He has never used smokeless tobacco. He reports previous alcohol use. He reports that he does not use drugs. Family History: family history is not on file. The patients home medications have been reviewed.     Allergies: Augmentin [amoxicillin-pot clavulanate]    -------------------------------------------------- RESULTS -------------------------------------------------  All laboratory and radiology results have been personally reviewed by myself   LABS:  Results for orders placed or performed during the hospital encounter of 09/03/20   CBC Auto Differential   Result Value Ref Range    WBC 8.2 4.5 - 11.5 E9/L    RBC 4.88 3.80 - 5.80 E12/L    Hemoglobin 13.6 12.5 - 16.5 g/dL    Hematocrit 41.2 37.0 - 54.0 %    MCV 84.4 80.0 - 99.9 fL    MCH 27.9 26.0 - 35.0 pg    MCHC 33.0 32.0 - 34.5 %    RDW 14.0 11.5 - 15.0 fL    Platelets 536 852 - 521 E9/L    MPV 10.2 7.0 - 12.0 fL    Neutrophils % 53.6 43.0 - 80.0 %    Immature Granulocytes % 0.4 0.0 - 5.0 %    Lymphocytes % 34.0 20.0 - 42.0 %    Monocytes % 8.0 2.0 - 12.0 %    Eosinophils % 3.4 0.0 - 6.0 %    Basophils % 0.6 0.0 - 2.0 %    Neutrophils Absolute 4.39 1.80 - 7.30 E9/L    Immature Granulocytes # 0.03 E9/L    Lymphocytes Absolute 2.79 1.50 - 4.00 E9/L    Monocytes Absolute 0.66 0.10 - 0.95 E9/L    Eosinophils Absolute 0.28 0.05 - 0.50 E9/L    Basophils Absolute 0.05 0.00 - 0.20 E1/E   Basic Metabolic Panel w/ Reflex to MG   Result Value Ref Range    Sodium 142 132 - 146 mmol/L    Potassium reflex Magnesium 4.2 3.5 - 5.0 mmol/L    Chloride 105 98 - 107 mmol/L    CO2 26 22 - 29 mmol/L    Anion Gap 11 7 - 16 mmol/L    Glucose 106 (H) 74 - 99 mg/dL    BUN 18 6 - 20 mg/dL    CREATININE 1.4 (H) 0.7 - 1.2 mg/dL    GFR Non-African American 52 >=60 mL/min/1.73    GFR African American >60     Calcium 9.4 8.6 - 10.2 mg/dL   Lactic Acid, Plasma Result Value Ref Range    Lactic Acid 1.1 0.5 - 2.2 mmol/L   Troponin   Result Value Ref Range    Troponin <0.01 0.00 - 0.03 ng/mL   Brain Natriuretic Peptide   Result Value Ref Range    Pro- (H) 0 - 125 pg/mL   Protime-INR   Result Value Ref Range    Protime 10.8 9.3 - 12.4 sec    INR 0.9    APTT   Result Value Ref Range    aPTT 27.9 24.5 - 35.1 sec   TSH without Reflex   Result Value Ref Range    TSH 3.200 0.270 - 4.200 uIU/mL   COVID-19   Result Value Ref Range    SARS-CoV-2, NAAT Not Detected Not Detected   EKG 12 Lead   Result Value Ref Range    Ventricular Rate 120 BPM    Atrial Rate 101 BPM    QRS Duration 132 ms    Q-T Interval 292 ms    QTc Calculation (Bazett) 412 ms    R Axis -71 degrees    T Axis 6 degrees   EKG 12 Lead   Result Value Ref Range    Ventricular Rate 54 BPM    Atrial Rate 54 BPM    P-R Interval 148 ms    QRS Duration 136 ms    Q-T Interval 432 ms    QTc Calculation (Bazett) 409 ms    P Axis 55 degrees    R Axis -47 degrees    T Axis 3 degrees       RADIOLOGY:  Interpreted by Radiologist.  NM LUNG VENT/PERFUSION (VQ)   Final Result   Negative         XR CHEST PORTABLE   Final Result   Near complete resolution of nodular opacity at the left lower lung.                ------------------------- NURSING NOTES AND VITALS REVIEWED ---------------------------   The nursing notes within the ED encounter and vital signs as below have been reviewed. BP (!) 147/88   Pulse 58   Temp 97.9 °F (36.6 °C) (Oral)   Resp 16   Ht 5' 11\" (1.803 m)   Wt 255 lb (115.7 kg)   SpO2 98%   BMI 35.57 kg/m²   Oxygen Saturation Interpretation: Normal      ---------------------------------------------------PHYSICAL EXAM--------------------------------------    Physical Exam  Vitals signs reviewed. Constitutional:       General: He is not in acute distress. Appearance: He is well-developed. He is not toxic-appearing. HENT:      Head: Normocephalic and atraumatic.       Mouth/Throat:      Mouth: Mucous membranes are moist.      Pharynx: Oropharynx is clear. Eyes:      Extraocular Movements: Extraocular movements intact. Pupils: Pupils are equal, round, and reactive to light. Neck:      Musculoskeletal: Normal range of motion and neck supple. Vascular: No JVD. Cardiovascular:      Rate and Rhythm: Tachycardia present. Rhythm irregular. No extrasystoles are present. Heart sounds: No murmur. Pulmonary:      Effort: Pulmonary effort is normal. No tachypnea or bradypnea. Breath sounds: Normal breath sounds. No stridor. No decreased breath sounds, wheezing, rhonchi or rales. Chest:      Chest wall: No mass or tenderness. Abdominal:      Palpations: Abdomen is soft. Tenderness: There is no abdominal tenderness. Musculoskeletal: Normal range of motion. Right lower leg: He exhibits no tenderness. Edema present. Left lower leg: He exhibits no tenderness. Edema present. Skin:     General: Skin is warm and dry. Capillary Refill: Capillary refill takes less than 2 seconds. Neurological:      General: No focal deficit present. Mental Status: He is alert and oriented to person, place, and time. Cranial Nerves: No cranial nerve deficit. Motor: No weakness. Psychiatric:         Mood and Affect: Mood normal.                 ------------------------------ ED COURSE/MEDICAL DECISION MAKING----------------------  Medications   dilTIAZem injection 20 mg (20 mg Intravenous Given 9/3/20 0836)   xenon xe 872 gas 20 millicurie (20 millicuries Inhalation Given 9/3/20 1118)   technetium albumin aggregated (MAA) solution 6 millicurie (6 millicuries Intravenous Given 9/3/20 1117)     EKG: This EKG is signed and interpreted by me. [id number]  Rate: 120  Rhythm: Atrial fibrillation  Interpretation: rapid ventricular irregular rhythm with right bundle branch block  Comparison: no previous EKG available      EKG: #2  This EKG is signed and interpreted by me. spoken with the patient and discussed todays results, in addition to providing specific details for the plan of care and counseling regarding the diagnosis and prognosis. Questions are answered at this time and they are agreeable with the plan.      --------------------------------- IMPRESSION AND DISPOSITION ---------------------------------    IMPRESSION  1. [facility]        DISPOSITION  Disposition: Discharge to home  Patient condition is good      NOTE: This report was transcribed using voice recognition software.  Every effort was made to ensure accuracy; however, inadvertent computerized transcription errors may be present       Trae Sharma DO  09/03/20 1609

## 2020-09-09 ENCOUNTER — HOSPITAL ENCOUNTER (OUTPATIENT)
Age: 60
Discharge: HOME | End: 2020-09-11
Payer: COMMERCIAL

## 2020-09-14 ENCOUNTER — OFFICE VISIT (OUTPATIENT)
Dept: CARDIOLOGY CLINIC | Age: 60
End: 2020-09-14
Payer: COMMERCIAL

## 2020-09-14 VITALS
HEART RATE: 59 BPM | RESPIRATION RATE: 16 BRPM | TEMPERATURE: 97.7 F | SYSTOLIC BLOOD PRESSURE: 120 MMHG | BODY MASS INDEX: 35.82 KG/M2 | WEIGHT: 255.9 LBS | DIASTOLIC BLOOD PRESSURE: 90 MMHG | HEIGHT: 71 IN

## 2020-09-14 PROCEDURE — 99214 OFFICE O/P EST MOD 30 MIN: CPT | Performed by: INTERNAL MEDICINE

## 2020-09-14 PROCEDURE — 93000 ELECTROCARDIOGRAM COMPLETE: CPT | Performed by: INTERNAL MEDICINE

## 2020-09-14 RX ORDER — METOPROLOL SUCCINATE 25 MG/1
25 TABLET, EXTENDED RELEASE ORAL DAILY
Qty: 90 TABLET | Refills: 3 | Status: SHIPPED
Start: 2020-09-14 | End: 2020-10-22 | Stop reason: DRUGHIGH

## 2020-09-14 RX ORDER — TRAMADOL HYDROCHLORIDE 50 MG/1
50 TABLET ORAL EVERY 6 HOURS PRN
COMMUNITY

## 2020-09-14 NOTE — PROGRESS NOTES
OUTPATIENT CARDIOLOGY FOLLOW-UP    Name: Mars Regan    Age: 62 y.o. Date of Service: 9/14/2020    Chief Complaint: Follow-up for PAF / atrial fibrillation with RVR    Referring Physician: Dr. Oxana Elliott    History of Present Illness:  Mars Regan is a 62 y.o. male who presented to the [facility] emergency department on 9/3/2020 for further evaluation of palpitations and atrial fibrillation with RVR. His PMH is outlined in detail below (see A/P below). He is typically active with no complaints of chest pain, SOB, lightheadedness, dizziness, or syncope. No history of PND or orthopnea. +long-standing history of occasional palpitations. +recent hematuria. +history of multiple urological malignancies -- currently undergoing evaluation for bladder mass --> he presented to [facility] on 9/3/2020 for cystoscopy/bipsy and pre-procedure evaluation demonstrated atrial fibrillation with RVR --> he was transferred to [facility] ER for further evaluation --> he spontaneously converted to SR after receiving IV cardizem in the ER. He is currently with no active cardiac complaints at rest. SB/SR on EKG.     Review of Systems:   Cardiac: As per HPI  General: No fever, chills  Pulmonary: As per HPI  HEENT: No visual disturbances, difficult swallowing  GI: No nausea, vomiting  Endocrine: No thyroid disease or DM  Musculoskeletal: VALERA x 4, no focal motor deficits  Skin: Intact, no rashes  Neuro/Psych: No headache or seizures    Past Medical History:  Past Medical History:   Diagnosis Date    Anemia     [facility] kidney ([facility]     left    DVT of leg (deep venous thrombosis) ([facility])     right    Pro[facility] cancer ([facility]     Solitary kidney     Testicular cancer ([facility]        Past Surgical History:  Past Surgical History:   Procedure Laterality Date    COLONOSCOPY      SHOULDER SURGERY      TESTICLE REMOVAL Right     WISDOM TOOTH EXTRACTION       Family History:  Family History   Problem Relation Age of Onset    Hypertension Father No [address] of premature CAD, SCD, or arrhythmia    Social History:  Social History     Socioeconomic History    Marital status:      Spouse name: Not on file    Number of children: Not on file    Years of education: Not on file    Highest education level: Not on file   Occupational History    Not on file   Social Needs    Financial resource strain: Not on file    Food insecurity     Worry: Not on file     Inability: Not on file    Transportation needs     Medical: Not on file     Non-medical: Not on file   Tobacco Use    Smoking status: Former Smoker     Packs/day: 0.50     Years: 25.00     Pack years: 12.50     Types: Cigarettes     Last attempt to quit: 2019     Years since quittin.7    Smokeless tobacco: Never Used   Substance and Sexual Activity    Alcohol use: Not Currently    Drug use: Never    Sexual activity: Not Currently   Lifestyle    Physical activity     Days per week: Not on file     Minutes per session: Not on file    Stress: Not on file   Relationships    Social connections     Talks on phone: Not on file     Gets together: Not on file     Attends Yazidi service: Not on file     Active member of club or organization: Not on file     Attends meetings of clubs or organizations: Not on file     Relationship status: Not on file    Intimate partner violence     Fear of current or ex partner: Not on file     Emotionally abused: Not on file     Physically abused: Not on file     Forced sexual activity: Not on file   Other Topics Concern    Not on file   Social History Narrative    Not on file       Allergies: Allergies   Allergen Reactions    Augmentin [Amoxicillin-Pot Clavulanate]        Home Medications:  Prior to Admission medications    Medication Sig Start Date End Date Taking?  Authorizing Provider   metoprolol succinate (TOPROL XL) 25 MG extended release tablet Take 1 tablet by mouth daily 9/3/20   Sara Pires DO   aspirin EC 81 MG EC tablet Take 1 tablet by mouth daily 7/21/20   Pratima Gonzalez MD   atorvastatin (LIPITOR) 20 MG tablet Take 20 mg by mouth daily    Historical Provider, MD   gabapentin (NEURONTIN) 600 MG tablet Take 600 mg by mouth. Historical Provider, MD   omeprazole (PRILOSEC) 40 MG delayed release capsule Take 40 mg by mouth daily    Historical Provider, MD   enoxaparin (LOVENOX) 120 MG/0.8ML injection Inject 1 mg/kg into the skin every 12 hours    Historical Provider, MD   calcium carbonate (OSCAL) 500 MG TABS tablet Take 500 mg by mouth daily    Historical Provider, MD   Cholecalciferol (VITAMIN D) 50 MCG (2000 UT) CAPS capsule Take by mouth    Historical Provider, MD       Current Medications:  Current Outpatient Medications   Medication Sig Dispense Refill    metoprolol succinate (TOPROL XL) 25 MG extended release tablet Take 1 tablet by mouth daily 30 tablet 0    aspirin EC 81 MG EC tablet Take 1 tablet by mouth daily 30 tablet 5    atorvastatin (LIPITOR) 20 MG tablet Take 20 mg by mouth daily      gabapentin (NEURONTIN) 600 MG tablet Take 600 mg by mouth.  omeprazole (PRILOSEC) 40 MG delayed release capsule Take 40 mg by mouth daily      enoxaparin (LOVENOX) 120 MG/0.8ML injection Inject 1 mg/kg into the skin every 12 hours      calcium carbonate (OSCAL) 500 MG TABS tablet Take 500 mg by mouth daily      Cholecalciferol (VITAMIN D) 50 MCG (2000 UT) CAPS capsule Take by mouth       No current facility-administered medications for this visit.       Physical Exam:  BP (!) 120/90   Temp 97.7 °F (36.5 °C) (Temporal)   Resp 16   Ht 5' 11\" (1.803 m)   Wt 255 lb 14.4 oz (116.1 kg)   BMI 35.69 kg/m²    -117  Wt Readings from Last 3 Encounters:   09/14/20 255 lb 14.4 oz (116.1 kg)   09/03/20 255 lb (115.7 kg)   07/21/20 252 lb (114.3 kg)     Appearance: Awake, alert, no acute respiratory distress  Skin: Intact, no rash  Head: Normocephalic, atraumatic  Eyes: EOMI, no conjunctival erythema  ENMT: No pharyngeal erythema, MMM, no rhinorrhea  Neck: Supple, no elevated JVP, no carotid bruits  Lungs: Clear to auscultation bilaterally. No wheezes, rales, or rhonchi. Cardiac: Regular rate and rhythm, +S1S2, no murmurs apparent  Abdomen: Soft, nontender, +bowel sounds  Extremities: Moves all extremities x 4, no lower extremity edema  Neurologic: No focal motor deficits apparent, normal mood and affect  Peripheral Pulses: Intact posterior tibial pulses bilaterally    Intake/Output:  No intake or output data in the 24 hours ending 09/14/20 0821  No intake/output data recorded.     Laboratory Tests:  Lab Results   Component Value Date    CREATININE 1.4 (H) 09/03/2020    BUN 18 09/03/2020     09/03/2020    K 4.2 09/03/2020     09/03/2020    CO2 26 09/03/2020     Lab Results   Component Value Date    MG 1.6 07/06/2011     Lab Results   Component Value Date    WBC 8.2 09/03/2020    HGB 13.6 09/03/2020    HCT 41.2 09/03/2020    MCV 84.4 09/03/2020     09/03/2020     Lab Results   Component Value Date    CKTOTAL 71 08/26/2011    CKMB 1.8 08/26/2011    TROPONINI <0.01 09/03/2020    TROPONINI 0.02 07/06/2011     Lab Results   Component Value Date    INR 0.9 09/03/2020    INR 1.0 01/17/2020    PROTIME 10.8 09/03/2020    PROTIME 11.5 01/17/2020     Lab Results   Component Value Date    TSH 3.200 09/03/2020     Lab Results   Component Value Date    CHOL 169 08/08/2011    CHOL 222 (H) 07/07/2011    CHOL 256 (H) 06/13/2011     Lab Results   Component Value Date    TRIG 191 (H) 08/08/2011    TRIG 112 07/07/2011    TRIG 85 06/13/2011     Lab Results   Component Value Date    HDL 33.0 (A) 08/08/2011    HDL 70.0 07/07/2011    HDL 92.0 06/13/2011     Lab Results   Component Value Date    LDLCALC 98 08/08/2011    LDLCALC 130 (H) 07/07/2011    LDLCALC 147 (H) 06/13/2011     Cardiac Tests:  ECG (9/3/2020): atrial fibrillation with RVR, RBBB, LAD    Repeat EKG (9/3/2020): SB, rate 54, RBBB, LAFB    EKG (9/14/2020): SB, rate 59, RBBB/LAFB    Telemetry findings reviewed: SR, rate 60's    V/Q scan: 9/3/2020  Negative study    ASSESSMENT / PLAN:  1. PAF / atrial fibrillation with RVR -- spontaneously converted to SR, normal TSH, [id number] score 0 or 1 (depending on presence of HTN -- \"was told my blood pressure was high 10 years ago, but it's been fine since then\"; BP typically controlled on no anti-HTN agents as an outpatient) --> maintaining SR  2. Hematuria -- work-up ongoing  3. Bladder mass -- cystoscopy/biopsy cancelled today (in the setting of #1)  4. Renal cell CA s/p surgery at [facility] in 12/2019  5. Prostate cancer s/p radiation treatments ~ 5 years ago  10. Testicular cancer s/p surgery in 1992  7. History of DVT/PE post-op following surgery for renal cell CA in 12/2019 (s/p treatment with xarelto) -- negative V/Q scan this admission  8. Renal insufficiency -- Cr 1.4  9. HLD -- on statin  10. BMI 35.6  11.  Chronic RBBB/LAFB    - Echocardiogram ordered top  - Toprol XL 25 mg daily added on 9/3/2020  - R[facility] --> OAC not added at this time  - Outpatient sleep study if no prior study  - Home BP monitoring  - Ok for cystoscopy and biopsy from a cardiology standpoint    Uyen Leiva MD  [facility]) Cardiology

## 2020-09-15 ENCOUNTER — HOSPITAL ENCOUNTER (OUTPATIENT)
Age: 60
Discharge: HOME | End: 2020-09-15
Payer: COMMERCIAL

## 2020-09-15 PROCEDURE — 87088 URINE BACTERIA CULTURE: CPT

## 2020-09-15 PROCEDURE — 81001 URINALYSIS AUTO W/SCOPE: CPT

## 2020-09-16 LAB
BACTERIA: ABNORMAL /HPF
BILIRUBIN URINE: NEGATIVE
BLOOD, URINE: ABNORMAL
CLARITY: CLEAR
COLOR: YELLOW
EPITHELIAL CELLS, UA: ABNORMAL /HPF
GLUCOSE URINE: NEGATIVE MG/DL
KETONES, URINE: NEGATIVE MG/DL
LEUKOCYTE ESTERASE, URINE: ABNORMAL
NITRITE, URINE: NEGATIVE
PH UA: 6 (ref 5–9)
PROTEIN UA: NEGATIVE MG/DL
RBC UA: ABNORMAL /HPF (ref 0–2)
SPECIFIC GRAVITY UA: 1.01 (ref 1–1.03)
UROBILINOGEN, URINE: 0.2 E.U./DL
WBC UA: ABNORMAL /HPF (ref 0–5)

## 2020-09-17 LAB — URINE CULTURE, ROUTINE: NORMAL

## 2020-09-21 ENCOUNTER — HOSPITAL ENCOUNTER (OUTPATIENT)
Age: 60
Discharge: HOME | End: 2020-09-23

## 2020-09-21 PROCEDURE — 88305 TISSUE EXAM BY PATHOLOGIST: CPT

## 2020-09-25 ENCOUNTER — TELEPHONE (OUTPATIENT)
Dept: CARDIOLOGY | Age: 60
End: 2020-09-25

## 2020-09-25 NOTE — TELEPHONE ENCOUNTER
SCHEDULED ECHO FOR 10-09-20. REVIEWED COVID CHECKLIST WITH PATIENT.     Electronically signed by Gali Nino on 9/25/2020 at 1:49 PM

## 2020-10-08 ENCOUNTER — TELEPHONE (OUTPATIENT)
Dept: CARDIOLOGY | Age: 60
End: 2020-10-08

## 2020-10-09 ENCOUNTER — HOSPITAL ENCOUNTER (OUTPATIENT)
Dept: CARDIOLOGY | Age: 60
Discharge: HOME | End: 2020-10-09
Payer: COMMERCIAL

## 2020-10-09 LAB
LV EF: 58 %
LVEF MODALITY: NORMAL

## 2020-10-09 PROCEDURE — 93306 TTE W/DOPPLER COMPLETE: CPT

## 2020-10-14 ENCOUNTER — TELEPHONE (OUTPATIENT)
Dept: CARDIOLOGY CLINIC | Age: 60
End: 2020-10-14

## 2020-10-14 NOTE — TELEPHONE ENCOUNTER
----- Message from Eder Rogers MD sent at 10/13/2020 11:42 PM EDT -----  Normal ventricular function and mild mitral & tricuspid regurgitation (continue to monitor). Cardiac rhythm was atrial fibrillation at the time of the echo.

## 2020-10-14 NOTE — TELEPHONE ENCOUNTER
Patient notified and understanding. Patient would like to know if he needs to go on any blood thinners for his afib. Please advise.

## 2020-10-19 NOTE — TELEPHONE ENCOUNTER
Patient notified of Dr. Ludivina Sinclair recommendation. He was driving and will call me back to schedule EKG.

## 2020-10-22 ENCOUNTER — TELEPHONE (OUTPATIENT)
Dept: CARDIOLOGY CLINIC | Age: 60
End: 2020-10-22

## 2020-10-22 ENCOUNTER — NURSE ONLY (OUTPATIENT)
Dept: CARDIOLOGY CLINIC | Age: 60
End: 2020-10-22
Payer: COMMERCIAL

## 2020-10-22 PROCEDURE — 93000 ELECTROCARDIOGRAM COMPLETE: CPT | Performed by: INTERNAL MEDICINE

## 2020-10-22 RX ORDER — METOPROLOL SUCCINATE 25 MG/1
25 TABLET, EXTENDED RELEASE ORAL 2 TIMES DAILY
COMMUNITY
End: 2020-10-23 | Stop reason: SDUPTHER

## 2020-10-22 NOTE — TELEPHONE ENCOUNTER
Patient notified of EKG results and Dr. Jalyn Tran recommendation. Chart amended to reflect dose adjustment.

## 2020-10-23 RX ORDER — METOPROLOL SUCCINATE 25 MG/1
25 TABLET, EXTENDED RELEASE ORAL 2 TIMES DAILY
Qty: 180 TABLET | Refills: 3 | Status: SHIPPED | OUTPATIENT
Start: 2020-10-23

## 2020-12-10 ENCOUNTER — HOSPITAL ENCOUNTER (OUTPATIENT)
Age: 60
Discharge: HOME | End: 2020-12-12

## 2020-12-10 PROCEDURE — 88305 TISSUE EXAM BY PATHOLOGIST: CPT

## 2021-03-24 ENCOUNTER — IMMUNIZATION (OUTPATIENT)
Dept: PRIMARY CARE CLINIC | Age: 61
End: 2021-03-24
Payer: COMMERCIAL

## 2021-03-24 PROCEDURE — 0011A COVID-19, MODERNA VACCINE 100MCG/0.5ML DOSE: CPT | Performed by: NURSE PRACTITIONER

## 2021-03-24 PROCEDURE — 91301 COVID-19, MODERNA VACCINE 100MCG/0.5ML DOSE: CPT | Performed by: NURSE PRACTITIONER

## 2021-04-21 ENCOUNTER — IMMUNIZATION (OUTPATIENT)
Dept: PRIMARY CARE CLINIC | Age: 61
End: 2021-04-21
Payer: COMMERCIAL

## 2021-04-21 PROCEDURE — 0012A COVID-19, MODERNA VACCINE 100MCG/0.5ML DOSE: CPT | Performed by: NURSE PRACTITIONER

## 2021-04-21 PROCEDURE — 91301 COVID-19, MODERNA VACCINE 100MCG/0.5ML DOSE: CPT | Performed by: NURSE PRACTITIONER

## 2021-07-26 ENCOUNTER — HOSPITAL ENCOUNTER (OUTPATIENT)
Age: 61
Discharge: HOME | End: 2021-07-28

## 2021-07-26 PROCEDURE — 88307 TISSUE EXAM BY PATHOLOGIST: CPT

## 2021-12-13 ENCOUNTER — HOSPITAL ENCOUNTER (OUTPATIENT)
Age: 61
Discharge: HOME | End: 2021-12-15

## 2021-12-13 PROCEDURE — 88305 TISSUE EXAM BY PATHOLOGIST: CPT

## 2022-05-02 ENCOUNTER — HOSPITAL ENCOUNTER (OUTPATIENT)
Age: 62
Discharge: HOME | End: 2022-05-02
Payer: COMMERCIAL

## 2022-05-02 LAB
BACTERIA: ABNORMAL /HPF
BILIRUBIN URINE: NEGATIVE
BLOOD, URINE: ABNORMAL
CLARITY: CLEAR
COLOR: YELLOW
EPITHELIAL CELLS, UA: ABNORMAL /HPF
GLUCOSE URINE: NEGATIVE MG/DL
KETONES, URINE: NEGATIVE MG/DL
LEUKOCYTE ESTERASE, URINE: NEGATIVE
NITRITE, URINE: NEGATIVE
PH UA: 6 (ref 5–9)
PROTEIN UA: NEGATIVE MG/DL
RBC UA: ABNORMAL /HPF (ref 0–2)
SPECIFIC GRAVITY UA: <=1.005 (ref 1–1.03)
UROBILINOGEN, URINE: 0.2 E.U./DL
WBC UA: ABNORMAL /HPF (ref 0–5)

## 2022-05-02 PROCEDURE — 81001 URINALYSIS AUTO W/SCOPE: CPT

## 2022-05-02 PROCEDURE — 87088 URINE BACTERIA CULTURE: CPT

## 2022-05-04 LAB — URINE CULTURE, ROUTINE: NORMAL

## 2022-05-12 ENCOUNTER — HOSPITAL ENCOUNTER (OUTPATIENT)
Age: 62
Discharge: HOME | End: 2022-05-14

## 2022-05-12 PROCEDURE — 88305 TISSUE EXAM BY PATHOLOGIST: CPT

## 2023-01-05 ENCOUNTER — HOSPITAL ENCOUNTER (OUTPATIENT)
Age: 63
Discharge: HOME | End: 2023-01-07

## 2023-01-09 ENCOUNTER — APPOINTMENT (OUTPATIENT)
Dept: MRI IMAGING | Age: 63
End: 2023-01-09
Payer: COMMERCIAL

## 2023-01-09 ENCOUNTER — APPOINTMENT (OUTPATIENT)
Dept: GENERAL RADIOLOGY | Age: 63
End: 2023-01-09
Payer: COMMERCIAL

## 2023-01-09 ENCOUNTER — APPOINTMENT (OUTPATIENT)
Dept: CT IMAGING | Age: 63
End: 2023-01-09
Payer: COMMERCIAL

## 2023-01-09 ENCOUNTER — HOSPITAL ENCOUNTER (INPATIENT)
Age: 63
LOS: 3 days | Discharge: HOME | End: 2023-01-12
Attending: EMERGENCY MEDICINE | Admitting: FAMILY MEDICINE
Payer: COMMERCIAL

## 2023-01-09 DIAGNOSIS — I63.512 CEREBROVASCULAR ACCIDENT (CVA) DUE TO OCCLUSION OF LEFT MIDDLE CEREBRAL ARTERY (HCC): Primary | ICD-10-CM

## 2023-01-09 LAB
ALBUMIN SERPL-MCNC: 4.2 G/DL (ref 3.5–5.2)
ALP BLD-CCNC: 64 U/L (ref 40–129)
ALT SERPL-CCNC: 16 U/L (ref 0–40)
ANION GAP SERPL CALCULATED.3IONS-SCNC: 14 MMOL/L (ref 7–16)
APTT: 29.8 SEC (ref 24.5–35.1)
AST SERPL-CCNC: 29 U/L (ref 0–39)
BASOPHILS ABSOLUTE: 0.04 E9/L (ref 0–0.2)
BASOPHILS RELATIVE PERCENT: 0.6 % (ref 0–2)
BILIRUB SERPL-MCNC: 0.3 MG/DL (ref 0–1.2)
BILIRUBIN URINE: NEGATIVE
BLOOD, URINE: NEGATIVE
BUN BLDV-MCNC: 21 MG/DL (ref 6–23)
CALCIUM SERPL-MCNC: 9.3 MG/DL (ref 8.6–10.2)
CHLORIDE BLD-SCNC: 104 MMOL/L (ref 98–107)
CLARITY: CLEAR
CO2: 23 MMOL/L (ref 22–29)
COLOR: YELLOW
CREAT SERPL-MCNC: 1.3 MG/DL (ref 0.7–1.2)
EKG ATRIAL RATE: 66 BPM
EKG P AXIS: 59 DEGREES
EKG P-R INTERVAL: 158 MS
EKG Q-T INTERVAL: 448 MS
EKG QRS DURATION: 136 MS
EKG QTC CALCULATION (BAZETT): 469 MS
EKG R AXIS: -43 DEGREES
EKG T AXIS: 17 DEGREES
EKG VENTRICULAR RATE: 66 BPM
EOSINOPHILS ABSOLUTE: 0.3 E9/L (ref 0.05–0.5)
EOSINOPHILS RELATIVE PERCENT: 4.7 % (ref 0–6)
GFR SERPL CREATININE-BSD FRML MDRD: >60 ML/MIN/1.73
GLUCOSE BLD-MCNC: 113 MG/DL (ref 74–99)
GLUCOSE URINE: NEGATIVE MG/DL
HCT VFR BLD CALC: 38.4 % (ref 37–54)
HEMOGLOBIN: 12.9 G/DL (ref 12.5–16.5)
IMMATURE GRANULOCYTES #: 0.03 E9/L
IMMATURE GRANULOCYTES %: 0.5 % (ref 0–5)
INR BLD: 1
KETONES, URINE: NEGATIVE MG/DL
LEUKOCYTE ESTERASE, URINE: NEGATIVE
LYMPHOCYTES ABSOLUTE: 2.02 E9/L (ref 1.5–4)
LYMPHOCYTES RELATIVE PERCENT: 31.3 % (ref 20–42)
MCH RBC QN AUTO: 29.4 PG (ref 26–35)
MCHC RBC AUTO-ENTMCNC: 33.6 % (ref 32–34.5)
MCV RBC AUTO: 87.5 FL (ref 80–99.9)
METER GLUCOSE: 124 MG/DL (ref 74–99)
MONOCYTES ABSOLUTE: 0.24 E9/L (ref 0.1–0.95)
MONOCYTES RELATIVE PERCENT: 3.7 % (ref 2–12)
NEUTROPHILS ABSOLUTE: 3.82 E9/L (ref 1.8–7.3)
NEUTROPHILS RELATIVE PERCENT: 59.2 % (ref 43–80)
NITRITE, URINE: NEGATIVE
PDW BLD-RTO: 13.4 FL (ref 11.5–15)
PH UA: 5.5 (ref 5–9)
PLATELET # BLD: 187 E9/L (ref 130–450)
PMV BLD AUTO: 10.8 FL (ref 7–12)
POTASSIUM SERPL-SCNC: 4.3 MMOL/L (ref 3.5–5)
PROTEIN UA: NEGATIVE MG/DL
PROTHROMBIN TIME: 11.3 SEC (ref 9.3–12.4)
RBC # BLD: 4.39 E12/L (ref 3.8–5.8)
SODIUM BLD-SCNC: 141 MMOL/L (ref 132–146)
SPECIFIC GRAVITY UA: <=1.005 (ref 1–1.03)
TOTAL PROTEIN: 6.7 G/DL (ref 6.4–8.3)
UROBILINOGEN, URINE: 0.2 E.U./DL
WBC # BLD: 6.5 E9/L (ref 4.5–11.5)

## 2023-01-09 PROCEDURE — 93010 ELECTROCARDIOGRAM REPORT: CPT | Performed by: INTERNAL MEDICINE

## 2023-01-09 PROCEDURE — 99285 EMERGENCY DEPT VISIT HI MDM: CPT

## 2023-01-09 PROCEDURE — 70450 CT HEAD/BRAIN W/O DYE: CPT | Performed by: RADIOLOGY

## 2023-01-09 PROCEDURE — 2000000000 HC ICU R&B

## 2023-01-09 PROCEDURE — 6360000004 HC RX CONTRAST MEDICATION: Performed by: RADIOLOGY

## 2023-01-09 PROCEDURE — 0042T CT BRAIN PERFUSION: CPT

## 2023-01-09 PROCEDURE — 71045 X-RAY EXAM CHEST 1 VIEW: CPT

## 2023-01-09 PROCEDURE — 80053 COMPREHEN METABOLIC PANEL: CPT

## 2023-01-09 PROCEDURE — 0042T CT BRAIN PERFUSION: CPT | Performed by: RADIOLOGY

## 2023-01-09 PROCEDURE — 85730 THROMBOPLASTIN TIME PARTIAL: CPT

## 2023-01-09 PROCEDURE — 2580000003 HC RX 258

## 2023-01-09 PROCEDURE — 2580000003 HC RX 258: Performed by: RADIOLOGY

## 2023-01-09 PROCEDURE — 6370000000 HC RX 637 (ALT 250 FOR IP)

## 2023-01-09 PROCEDURE — 70496 CT ANGIOGRAPHY HEAD: CPT

## 2023-01-09 PROCEDURE — 96360 HYDRATION IV INFUSION INIT: CPT

## 2023-01-09 PROCEDURE — 70496 CT ANGIOGRAPHY HEAD: CPT | Performed by: RADIOLOGY

## 2023-01-09 PROCEDURE — 70450 CT HEAD/BRAIN W/O DYE: CPT

## 2023-01-09 PROCEDURE — 4A03X5D MEASUREMENT OF ARTERIAL FLOW, INTRACRANIAL, EXTERNAL APPROACH: ICD-10-PCS | Performed by: RADIOLOGY

## 2023-01-09 PROCEDURE — 70498 CT ANGIOGRAPHY NECK: CPT | Performed by: RADIOLOGY

## 2023-01-09 PROCEDURE — 93005 ELECTROCARDIOGRAM TRACING: CPT | Performed by: EMERGENCY MEDICINE

## 2023-01-09 PROCEDURE — 85610 PROTHROMBIN TIME: CPT

## 2023-01-09 PROCEDURE — 81003 URINALYSIS AUTO W/O SCOPE: CPT

## 2023-01-09 PROCEDURE — 82962 GLUCOSE BLOOD TEST: CPT

## 2023-01-09 PROCEDURE — 70498 CT ANGIOGRAPHY NECK: CPT

## 2023-01-09 PROCEDURE — 6370000000 HC RX 637 (ALT 250 FOR IP): Performed by: EMERGENCY MEDICINE

## 2023-01-09 PROCEDURE — 85025 COMPLETE CBC W/AUTO DIFF WBC: CPT

## 2023-01-09 PROCEDURE — 70551 MRI BRAIN STEM W/O DYE: CPT

## 2023-01-09 RX ORDER — CLOPIDOGREL BISULFATE 75 MG/1
75 TABLET ORAL ONCE
Status: DISCONTINUED | OUTPATIENT
Start: 2023-01-09 | End: 2023-01-09

## 2023-01-09 RX ORDER — CLOPIDOGREL 300 MG/1
300 TABLET, FILM COATED ORAL ONCE
Status: COMPLETED | OUTPATIENT
Start: 2023-01-09 | End: 2023-01-09

## 2023-01-09 RX ORDER — IPRATROPIUM BROMIDE AND ALBUTEROL SULFATE 2.5; .5 MG/3ML; MG/3ML
1 SOLUTION RESPIRATORY (INHALATION)
Status: DISCONTINUED | OUTPATIENT
Start: 2023-01-10 | End: 2023-01-11

## 2023-01-09 RX ORDER — ASPIRIN 300 MG/1
300 SUPPOSITORY RECTAL DAILY
Status: DISCONTINUED | OUTPATIENT
Start: 2023-01-10 | End: 2023-01-11

## 2023-01-09 RX ORDER — METOPROLOL SUCCINATE 200 MG/1
200 TABLET, EXTENDED RELEASE ORAL DAILY
Status: ON HOLD | COMMUNITY
End: 2023-01-12 | Stop reason: SDUPTHER

## 2023-01-09 RX ORDER — ONDANSETRON 4 MG/1
4 TABLET, ORALLY DISINTEGRATING ORAL EVERY 8 HOURS PRN
Status: DISCONTINUED | OUTPATIENT
Start: 2023-01-09 | End: 2023-01-09

## 2023-01-09 RX ORDER — ACETAMINOPHEN 650 MG/1
650 SUPPOSITORY RECTAL EVERY 6 HOURS PRN
Status: DISCONTINUED | OUTPATIENT
Start: 2023-01-09 | End: 2023-01-12 | Stop reason: HOSPADM

## 2023-01-09 RX ORDER — TRAMADOL HYDROCHLORIDE 50 MG/1
50 TABLET ORAL EVERY 6 HOURS PRN
Status: DISCONTINUED | OUTPATIENT
Start: 2023-01-09 | End: 2023-01-12 | Stop reason: HOSPADM

## 2023-01-09 RX ORDER — SODIUM CHLORIDE 0.9 % (FLUSH) 0.9 %
10 SYRINGE (ML) INJECTION
Status: COMPLETED | OUTPATIENT
Start: 2023-01-09 | End: 2023-01-09

## 2023-01-09 RX ORDER — ASPIRIN 325 MG
325 TABLET ORAL ONCE
Status: DISCONTINUED | OUTPATIENT
Start: 2023-01-09 | End: 2023-01-09

## 2023-01-09 RX ORDER — ACETAMINOPHEN 500 MG
1000 TABLET ORAL ONCE
Status: COMPLETED | OUTPATIENT
Start: 2023-01-09 | End: 2023-01-09

## 2023-01-09 RX ORDER — ACETAMINOPHEN 325 MG/1
650 TABLET ORAL EVERY 6 HOURS PRN
Status: DISCONTINUED | OUTPATIENT
Start: 2023-01-09 | End: 2023-01-12 | Stop reason: HOSPADM

## 2023-01-09 RX ORDER — ASPIRIN 81 MG/1
81 TABLET ORAL DAILY
Status: ON HOLD | COMMUNITY
End: 2023-01-12 | Stop reason: HOSPADM

## 2023-01-09 RX ORDER — SODIUM CHLORIDE 0.9 % (FLUSH) 0.9 %
5-40 SYRINGE (ML) INJECTION PRN
Status: DISCONTINUED | OUTPATIENT
Start: 2023-01-09 | End: 2023-01-12 | Stop reason: HOSPADM

## 2023-01-09 RX ORDER — CLOPIDOGREL BISULFATE 75 MG/1
75 TABLET ORAL DAILY
Status: DISCONTINUED | OUTPATIENT
Start: 2023-01-10 | End: 2023-01-11

## 2023-01-09 RX ORDER — SODIUM CHLORIDE 9 MG/ML
INJECTION, SOLUTION INTRAVENOUS PRN
Status: DISCONTINUED | OUTPATIENT
Start: 2023-01-09 | End: 2023-01-12 | Stop reason: HOSPADM

## 2023-01-09 RX ORDER — 0.9 % SODIUM CHLORIDE 0.9 %
1000 INTRAVENOUS SOLUTION INTRAVENOUS ONCE
Status: COMPLETED | OUTPATIENT
Start: 2023-01-09 | End: 2023-01-09

## 2023-01-09 RX ORDER — ONDANSETRON 2 MG/ML
4 INJECTION INTRAMUSCULAR; INTRAVENOUS EVERY 6 HOURS PRN
Status: DISCONTINUED | OUTPATIENT
Start: 2023-01-09 | End: 2023-01-12 | Stop reason: HOSPADM

## 2023-01-09 RX ORDER — ONDANSETRON 4 MG/1
4 TABLET, ORALLY DISINTEGRATING ORAL EVERY 8 HOURS PRN
Status: DISCONTINUED | OUTPATIENT
Start: 2023-01-09 | End: 2023-01-12 | Stop reason: HOSPADM

## 2023-01-09 RX ORDER — METOPROLOL SUCCINATE 50 MG/1
25 TABLET, EXTENDED RELEASE ORAL 2 TIMES DAILY
Status: DISCONTINUED | OUTPATIENT
Start: 2023-01-09 | End: 2023-01-10

## 2023-01-09 RX ORDER — LABETALOL HYDROCHLORIDE 5 MG/ML
10 INJECTION, SOLUTION INTRAVENOUS EVERY 10 MIN PRN
Status: DISCONTINUED | OUTPATIENT
Start: 2023-01-09 | End: 2023-01-12 | Stop reason: HOSPADM

## 2023-01-09 RX ORDER — ENOXAPARIN SODIUM 100 MG/ML
30 INJECTION SUBCUTANEOUS 2 TIMES DAILY
Status: DISCONTINUED | OUTPATIENT
Start: 2023-01-09 | End: 2023-01-11

## 2023-01-09 RX ORDER — ONDANSETRON 2 MG/ML
4 INJECTION INTRAMUSCULAR; INTRAVENOUS EVERY 6 HOURS PRN
Status: DISCONTINUED | OUTPATIENT
Start: 2023-01-09 | End: 2023-01-09

## 2023-01-09 RX ORDER — POLYETHYLENE GLYCOL 3350 17 G/17G
17 POWDER, FOR SOLUTION ORAL DAILY PRN
Status: DISCONTINUED | OUTPATIENT
Start: 2023-01-09 | End: 2023-01-09

## 2023-01-09 RX ORDER — SODIUM CHLORIDE 0.9 % (FLUSH) 0.9 %
5-40 SYRINGE (ML) INJECTION EVERY 12 HOURS SCHEDULED
Status: DISCONTINUED | OUTPATIENT
Start: 2023-01-10 | End: 2023-01-12 | Stop reason: HOSPADM

## 2023-01-09 RX ORDER — POLYETHYLENE GLYCOL 3350 17 G/17G
17 POWDER, FOR SOLUTION ORAL DAILY PRN
Status: DISCONTINUED | OUTPATIENT
Start: 2023-01-09 | End: 2023-01-12 | Stop reason: HOSPADM

## 2023-01-09 RX ORDER — PANTOPRAZOLE SODIUM 40 MG/1
40 TABLET, DELAYED RELEASE ORAL
Status: DISCONTINUED | OUTPATIENT
Start: 2023-01-10 | End: 2023-01-12 | Stop reason: HOSPADM

## 2023-01-09 RX ORDER — GABAPENTIN 300 MG/1
600 CAPSULE ORAL DAILY
Status: DISCONTINUED | OUTPATIENT
Start: 2023-01-10 | End: 2023-01-12 | Stop reason: HOSPADM

## 2023-01-09 RX ORDER — ASPIRIN 81 MG/1
81 TABLET ORAL DAILY
Status: DISCONTINUED | OUTPATIENT
Start: 2023-01-10 | End: 2023-01-11

## 2023-01-09 RX ORDER — ATORVASTATIN CALCIUM 80 MG/1
80 TABLET, FILM COATED ORAL NIGHTLY
Status: DISCONTINUED | OUTPATIENT
Start: 2023-01-09 | End: 2023-01-12 | Stop reason: HOSPADM

## 2023-01-09 RX ADMIN — ACETAMINOPHEN 1000 MG: 500 TABLET ORAL at 19:35

## 2023-01-09 RX ADMIN — ASPIRIN 325 MG: 325 TABLET, COATED ORAL at 14:17

## 2023-01-09 RX ADMIN — CLOPIDOGREL BISULFATE 300 MG: 300 TABLET, FILM COATED ORAL at 14:18

## 2023-01-09 RX ADMIN — IOPAMIDOL 105 ML: 755 INJECTION, SOLUTION INTRAVENOUS at 10:25

## 2023-01-09 RX ADMIN — SODIUM CHLORIDE 1000 ML: 9 INJECTION, SOLUTION INTRAVENOUS at 10:42

## 2023-01-09 RX ADMIN — Medication 10 ML: at 10:25

## 2023-01-09 ASSESSMENT — PAIN SCALES - GENERAL: PAINLEVEL_OUTOF10: 8

## 2023-01-09 ASSESSMENT — PAIN - FUNCTIONAL ASSESSMENT: PAIN_FUNCTIONAL_ASSESSMENT: 0-10

## 2023-01-09 ASSESSMENT — PAIN DESCRIPTION - LOCATION: LOCATION: HEAD

## 2023-01-09 NOTE — H&P
Hospital Medicine History & Physical      PCP: Daniel Echeverria MD    Date of Admission: 1/9/2023    Date of Service: Pt seen/examined on 1/9/22 and Admitted to Inpatient with expected LOS greater than two midnights due to medical therapy. Chief Complaint:  aphasia, right sided weakness      History Of Present Illness:    50-year-old male past medical history of DVT on lower extremity, solitary kidney with history of kidney cancer, prostate cancer, testicular cancer, atrial fibrillation not on anticoagulation due to bladder cancer bleeding, former smoker presented today due to right-sided weakness. Patient has been off his aspirin for 7 days due to recent bleeding in his bladder. Presenting due to aphasia and right-sided weakness. Woke up 6 AM this morning had some difficulty using his right hand when he tried to open his pill bottles. Wife does notice difficulty with his speech and also numbness and weakness in his right side presents to the ER NIH of 4. From presentation was elevated at 176/39. CT brain perfusion showed concern for findings compatible with ischemic penumbra in the left parietal lobe. CT angio head and neck with left M3 occlusion. ER did speak to neuro interventionalists recommended loading with Plavix 20 mg and aspirin 325 mg. When I did see the patient his symptoms had resolved. He mentioned that his right side feels back to normal and speech has returned to normal.  He did mention he has a history of atrial fibrillation but was not on any blood thinner due to his history of bladder cancer. Taken of aspirin a week ago due to a bladder procedure.     Past Medical History:          Diagnosis Date    Anemia     Cancer of kidney ([address]     left    DVT of leg (deep venous thrombosis) (HCC)     right    Prostate cancer ([facility]     Solitary kidney     Testicular cancer [facility])        Past Surgical History:          Procedure Laterality Date    COLONOSCOPY      SHOULDER SURGERY      TESTICLE REMOVAL Right     WISDOM TOOTH EXTRACTION         Medications Prior to Admission:      Prior to Admission medications    Medication Sig Start Date End Date Taking? Authorizing Provider   metoprolol succinate (TOPROL XL) 25 MG extended release tablet Take 1 tablet by mouth 2 times daily 10/23/20   Soledad Aquino MD   traMADol (ULTRAM) 50 MG tablet Take 50 mg by mouth every 6 hours as needed for Pain. Historical Provider, MD   Multiple Vitamin (MULTI VITAMIN DAILY PO) Take by mouth daily    Historical Provider, MD   aspirin EC 81 MG EC tablet Take 1 tablet by mouth daily  Patient not taking: Reported on 9/14/2020 7/21/20   Karen Hernandez MD   atorvastatin (LIPITOR) 10 MG tablet Take 10 mg by mouth daily     Historical Provider, MD   gabapentin (NEURONTIN) 600 MG tablet Take 600 mg by mouth. Historical Provider, MD   omeprazole (PRILOSEC) 40 MG delayed release capsule Take 40 mg by mouth daily    Historical Provider, MD   enoxaparin (LOVENOX) 120 MG/0.8ML injection Inject 1 mg/kg into the skin every 12 hours    Historical Provider, MD   calcium 600 MG TABS tablet Take 600 mg by mouth daily     Historical Provider, MD   Cholecalciferol (VITAMIN D) 50 MCG (2000 UT) CAPS capsule Take by mouth    Historical Provider, MD       Allergies:  Augmentin [amoxicillin-pot clavulanate]    Social History:      The patient currently lives at home    TOBACCO:   reports that he quit smoking about 3 years ago. His smoking use included cigarettes. He has a 12.50 pack-year smoking history. He has never used smokeless tobacco.  ETOH:   reports that he does not currently use alcohol. Family History:       Reviewed in detail and negative for DM, CAD, Cancer, CVA. Positive as follows:        Problem Relation Age of Onset    Hypertension Father        REVIEW OF SYSTEMS:   Pertinent positives as noted in the HPI. All other systems reviewed and negative.     PHYSICAL EXAM:    /89   Pulse 62   Resp 16   Ht 5' 11\" (1.803 m) Wt 260 lb (117.9 kg)   SpO2 97%   BMI 36.26 kg/m²     General appearance:  No apparent distress, appears stated age and cooperative. HEENT:  Normal cephalic, atraumatic without obvious deformity. Pupils equal, round, and reactive to light. Extra ocular muscles intact. Conjunctivae/corneas clear. Neck: Supple, with full range of motion. No jugular venous distention. Trachea midline. Respiratory:  Normal respiratory effort. Clear to auscultation, bilaterally without Rales/Wheezes/Rhonchi. Cardiovascular:  Regular rate and rhythm with normal S1/S2 without murmurs, rubs or gallops. Abdomen: Soft, non-tender, non-distended with normal bowel sounds. Musculoskeletal:  No clubbing, cyanosis or edema bilaterally. Full range of motion without deformity. Skin: Skin color, texture, turgor normal.  No rashes or lesions. Neurologic:  Neurovascularly intact without any focal sensory/motor deficits. Cranial nerves: II-XII intact, grossly non-focal.  Psychiatric:  Alert and oriented, thought content appropriate, normal insight      Labs:     Recent Labs     01/09/23  1020   WBC 6.5   HGB 12.9   HCT 38.4        Recent Labs     01/09/23  1020      K 4.3      CO2 23   BUN 21   CREATININE 1.3*   CALCIUM 9.3     Recent Labs     01/09/23  1020   AST 29   ALT 16   BILITOT 0.3   ALKPHOS 64     Recent Labs     01/09/23  1020   INR 1.0     No results for input(s): CKTOTAL, TROPONINI in the last 72 hours.     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 Patient has been loaded with aspirin 325 mg and Plavix 200 mg. We will admit him to the neuro ICU. Blood pressure improved in the ER to 136/89. He will need close blood pressure monitoring. As needed IV hydralazine will be ordered. Neurology was consulted. MRI brain. Echocardiogram with bubble study. Did review CT brain perfusion with concern for left parietal lobe CVA. Check A1c and lipid panel. PT OT and speech therapy consultation. We will have cardiology see him given history of A. fib as he did mention was supposed to follow-up with [facility] at the end of the month for an appointment with a cardiologist as he had a Holter monitor in the picked out several beats of atrial fibrillation as he may need his medications adjusted. DVT Prophylaxis: lovenox  Diet: No diet orders on file  Code Status: No Order           Robyn Banda MD    Thank you Valla Saint, MD for the opportunity to be involved in this patient's care. If you have any questions or concerns please feel free to contact me through [address] [place] [address].

## 2023-01-09 NOTE — ED NOTES
Patient states he is \"feeling much better\". He states sometimes he has a hard time finding his words. Speech has returned to normal, Slight sensation difference on right side.       Shayna Styles RN  01/09/23 7227

## 2023-01-09 NOTE — ED PROVIDER NOTES
70-year-old male has been recently off aspirin for the past 7 days due to bleeding in his bladder, history of anemia, DVTs and status post nephrectomy for history of kidney cancer. He presents emergency department with aphasia. His last known well is 4 AM.  He woke up at 6:30 AM and had difficulty opening his vitamins with his right hand. His wife noticed his speech was being slurred and having numbness and weakness on the right side. He denies the following chest pain, shortness of breath, abdominal pain, nausea, vomiting, vision changes and headache, NSAID use he denies being on any anticoagulation. Chief Complaint   Patient presents with    Aphasia     Expressive and receptive; LKW 0400 when he got up to use the restroom, when he got out of bed at 0630, he was having R arm weakness and issues speaking       Review of Systems   Pertinent stated in the HPI above  Physical Exam  Constitutional:       Appearance: Normal appearance. HENT:      Mouth/Throat:      Mouth: Mucous membranes are moist.   Eyes:      General: No visual field deficit. Extraocular Movements: Extraocular movements intact. Conjunctiva/sclera: Conjunctivae normal.   Cardiovascular:      Rate and Rhythm: Normal rate and regular rhythm. Heart sounds: No murmur heard. Pulmonary:      Effort: Pulmonary effort is normal.      Breath sounds: No wheezing or rhonchi. Abdominal:      Palpations: Abdomen is soft. Tenderness: There is no abdominal tenderness. Musculoskeletal:         General: No swelling, tenderness, deformity or signs of injury. Normal range of motion. Cervical back: Normal range of motion and neck supple. Right lower leg: No edema. Left lower leg: No edema. Skin:     General: Skin is warm. Capillary Refill: Capillary refill takes less than 2 seconds. Neurological:      Mental Status: He is alert and oriented to person, place, and time.       Cranial Nerves: No facial asymmetry. Sensory: Sensory deficit present. Motor: Weakness present. No tremor, atrophy, abnormal muscle tone, seizure activity or pronator drift. Coordination: Coordination is intact. Finger-Nose-Finger Test and Heel to [place] grace Test normal.      Comments: There is sensory absent and motor weakness of the right side: Face and extremities   Psychiatric:         Mood and Affect: Mood normal.         Behavior: Behavior normal.       Procedures     EKG: This EKG is signed by emergency department physician. Rate: 66  Rhythm: Sinus  AXIS:left   ST Changes: No ST elevations no hyperacute T waves. Interpretation: Normal sinus with  Comparison: No previous EKG available    MDM     61-year-old male has been recently off aspirin for the past 7 days due to bleeding in his bladder, history of anemia, DVTs and status post nephrectomy for history of kidney cancer. He presents emergency department with aphasia. His last known well is 4 AM.  He woke up at 6:30 AM and had difficulty opening his vitamins with his right hand. His wife noticed his speech was being slurred and having numbness and weakness on the right side. He denies the following chest pain, headache, vision changes, back pain and NSAID use he denies being on any anticoagulation. NIH score is 4   Upon entering the room patient blood pressure is 136/139. Heart rate is 60s not tachycardic at 98% on room air. He is alert and keenly responsive. He does have slurred speech however he can be understood. He is able to answer the month and age appropriately. He is also able to state commands he can blink and squeeze his eyes. He has normal extraocular horizontal movements. No visual field deficits. I do not appreciate facial asymmetry. No drift in all 4 extremities. There is minor muscle weakness on the right side of the upper and lower extremities.   Patient feels numb in the right side of the face, right side of the upper extremity and right side of the lower extremity as well. There is no unilateral extinction. There is no limb ataxia finger-nose normal heel-to-shin is normal.  My impression is a left MCA occlusion due to the fact the patient is having slurred speech and neuro deficits of sensory and motor on the right side. CT head dry, CTA head and neck wet, CT brain perfusion was ordered to see if there is brain bleeding or perfusion mismatch or large vessel occlusions. I spoke with Dr. Romana De Jesus interventional neurologist who informed me about a M3 occlusion on the left side and recommended aspirin Plavix for the patient is not a tPA candidate. I put the aspirin and Plavix on hold on the request of waiting for MRI results to see if there is C2 flair changes for possible TN K candidate according to .  Patient was reassessed and he is having improvement in his symptoms which is also noticed by his wife at bedside. He was explained the neck step of getting a stat MRI done to look for changes and he agreed.  called me with the MRI results and showed minor changes with occlusion. I informed him of the patient is doing better, and he recommended not to get DKA and to load the patient with aspirin 325 and Plavix 300 and sent him to neuro ICU for further work-up. I explained the labs and results and the plan to the patient and family. He is able to pass the swallow study test at bedside. He agreed to be admitted for further work-up and will get aspirin and Plavix load. I discussed the patient with the admitting physician who agreed to admission. I considered migraine with aura however the patient was not having headaches and did not have hot flashes, vision changes prior or other prodromal symptoms prior to his symptoms.   I considered electrolyte abnormalities such as low potassium low sodium low calcium low magnesium however the patient electrolytes were normal.  I can did hypoglycemia have a point-of-care blood glucose is 124.  I considered aortic dissection however the patient had no chest pain no back pain and bilateral pulses were intact. ED Course as of 01/09/23 1932 Mon Jan 09, 2023   1038 I spoke with the neurologist at [facility]. He said the patient is not a tPA candidate. He will look at the imaging and call back. [MN]   5944 I spoke with Dr. Sherrell Saldivar he informed me about the patient has having M3 occlusion of the left MCA. He is out of the window for tPA and he recommended to place the patient on aspirin and Plavix and admit him to the neuro ICU. [MN]   8897 I spoke with telestroke physician he informed me that there is a left M4 branch occlusion and there is a perfusion defect on the CT brain perfusion study. He requested a stat MRI to see if there is any C2 flair changes. If there are no C2 flair changes we will consider giving the patient TN K. He will discuss with the family. He recommended to hold aspirin and Plavix until the MRI findings come back. [MN]   2905 MRI results are back. I discussed with the patient about TNKase the risk and benefits including hemorrhaging which can lead to another stroke. Patient stated that he is okay with getting TNKase long as that is what the neurological recommendation. I called telestroke to speak with the stroke physician to discuss about the MRI imaging that is resulted. I was told that the neurologist on-call for telestroke will call me back in a few minutes. [MN]   7067 I spoke with  and discussed the MRI results. He does see some ischemic changes. He recommended not to give patient TN K and to load him with aspirin 325 and Plavix 300 and to admit him to the neuro ICU for further work-up.  [MN]      ED Course User Index  [MN] Errol Ca, DO       Medications   ondansetron (ZOFRAN-ODT) disintegrating tablet 4 mg (has no administration in time range)     Or   ondansetron (ZOFRAN) injection 4 mg (has no administration in time range)   polyethylene glycol (GLYCOLAX) packet 17 g (has no administration in time range)   enoxaparin Sodium (LOVENOX) injection 30 mg (30 mg SubCUTAneous Not Given 1/9/23 1635)   aspirin EC tablet 81 mg (has no administration in time range)     Or   aspirin suppository 300 mg (has no administration in time range)   perflutren lipid microspheres (DEFINITY) injection 1.5 mL (has no administration in time range)   atorvastatin (LIPITOR) tablet 80 mg (has no administration in time range)   labetalol (NORMODYNE;TRANDATE) injection 10 mg (has no administration in time range)   aspirin EC tablet 325 mg (325 mg Oral Not Given 1/9/23 1502)   acetaminophen (TYLENOL) tablet 1,000 mg (has no administration in time range)   0.9 % sodium chloride bolus (0 mLs IntraVENous Stopped 1/9/23 1122)   iopamidol (ISOVUE-370) 76 % injection 105 mL (105 mLs IntraVENous Given 1/9/23 1025)   sodium chloride flush 0.9 % injection 10 mL (10 mLs IntraVENous Given 1/9/23 1025)   clopidogrel (PLAVIX) tablet 300 mg (300 mg Oral Given 1/9/23 1418)        --------------------------------------------- PAST HISTORY ---------------------------------------------  Past Medical History:  has a past medical history of Anemia, [facility] kidney ([facility] DVT of leg (deep venous thrombosis) ([facility] Prostate cancer ([facility] Solitary kidney, and Testicular cancer ([facility] Past Surgical History:  has a past surgical history that includes Testicle removal (Right); shoulder surgery; [place] tooth extraction; and Colonoscopy. Social History:  reports that he quit smoking about 3 years ago. His smoking use included cigarettes. He has a 12.50 pack-year smoking history. He has never used smokeless tobacco. He reports that he does not currently use alcohol. He reports that he does not use drugs. Family History: family history includes Hypertension in his father. The patients home medications have been reviewed.     Allergies: Augmentin [amoxicillin-pot clavulanate]    -------------------------------------------------- RESULTS -------------------------------------------------    LABS:  Results for orders placed or performed during the hospital encounter of 01/09/23   CBC with Auto Differential   Result Value Ref Range    WBC 6.5 4.5 - 11.5 E9/L    RBC 4.39 3.80 - 5.80 E12/L    Hemoglobin 12.9 12.5 - 16.5 g/dL    Hematocrit 38.4 37.0 - 54.0 %    MCV 87.5 80.0 - 99.9 fL    MCH 29.4 26.0 - 35.0 pg    MCHC 33.6 32.0 - 34.5 %    RDW 13.4 11.5 - 15.0 fL    Platelets 652 367 - 380 E9/L    MPV 10.8 7.0 - 12.0 fL    Neutrophils % 59.2 43.0 - 80.0 %    Immature Granulocytes % 0.5 0.0 - 5.0 %    Lymphocytes % 31.3 20.0 - 42.0 %    Monocytes % 3.7 2.0 - 12.0 %    Eosinophils % 4.7 0.0 - 6.0 %    Basophils % 0.6 0.0 - 2.0 %    Neutrophils Absolute 3.82 1.80 - 7.30 E9/L    Immature Granulocytes # 0.03 E9/L    Lymphocytes Absolute 2.02 1.50 - 4.00 E9/L    Monocytes Absolute 0.24 0.10 - 0.95 E9/L    Eosinophils Absolute 0.30 0.05 - 0.50 E9/L    Basophils Absolute 0.04 0.00 - 0.20 E9/L   CMP   Result Value Ref Range    Sodium 141 132 - 146 mmol/L    Potassium 4.3 3.5 - 5.0 mmol/L    Chloride 104 98 - 107 mmol/L    CO2 23 22 - 29 mmol/L    Anion Gap 14 7 - 16 mmol/L    Glucose 113 (H) 74 - 99 mg/dL    BUN 21 6 - 23 mg/dL    Creatinine 1.3 (H) 0.7 - 1.2 mg/dL    Est, Glom Filt Rate >60 >=60 mL/min/1.73    Calcium 9.3 8.6 - 10.2 mg/dL    Total Protein 6.7 6.4 - 8.3 g/dL    Albumin 4.2 3.5 - 5.2 g/dL    Total Bilirubin 0.3 0.0 - 1.2 mg/dL    Alkaline Phosphatase 64 40 - 129 U/L    ALT 16 0 - 40 U/L    AST 29 0 - 39 U/L   Protime-INR   Result Value Ref Range    Protime 11.3 9.3 - 12.4 sec    INR 1.0    Urinalysis   Result Value Ref Range    Color, UA Yellow Straw/Yellow    Clarity, UA Clear Clear    Glucose, Ur Negative Negative mg/dL    Bilirubin Urine Negative Negative    Ketones, Urine Negative Negative mg/dL    Specific Gravity, UA <=1.005 1.005 - 1.030    Blood, Urine Negative Negative    pH, UA 5.5 5.0 - 9.0    Protein, UA Negative Negative mg/dL    Urobilinogen, Urine 0.2 <2.0 E.U./dL    Nitrite, Urine Negative Negative    Leukocyte Esterase, Urine Negative Negative   APTT   Result Value Ref Range    aPTT 29.8 24.5 - 35.1 sec   POCT Glucose   Result Value Ref Range    Meter Glucose 124 (H) 74 - 99 mg/dL   EKG 12 Lead   Result Value Ref Range    Ventricular Rate 66 BPM    Atrial Rate 66 BPM    P-R Interval 158 ms    QRS Duration 136 ms    Q-T Interval 448 ms    QTc Calculation (Bazett) 469 ms    P Axis 59 degrees    R Axis -43 degrees    T Axis 17 degrees       RADIOLOGY:  MRI BRAIN WO CONTRAST   Final Result   No evidence of acute intracranial abnormality. No acute infarct seen. RECOMMENDATIONS:   Unavailable         XR CHEST PORTABLE   Final Result   No acute process. CT Head W/O Contrast   Final Result   Diffuse atrophy likely age related   Findings compatible with small vessel ischemic changes. Findings were called to time of dictation to Dr. Alina Foote   Final Result   1. Estimated stenosis of the proximal right and left internal carotid   artery by NASCET criteria is not hemodynamically significant   2. Mild atherosclerotic disease . 3. Left M3 occlusion. This study was analyzed by the [employer]. ai algorithm. CTA NECK W CONTRAST   Final Result   1. Estimated stenosis of the proximal right and left internal carotid   artery by NASCET criteria is not hemodynamically significant   2. Mild atherosclerotic disease . 3. Left M3 occlusion. This study was analyzed by the [employer] [address]. ai algorithm. CT BRAIN PERFUSION   Final Result      Findings compatible with ischemic penumbra in the left parietal lobe      This study was analyzed by the [employer]. ai algorithm.                    ------------------------- NURSING NOTES AND VITALS REVIEWED ---------------------------  Date / Time Roomed:  1/9/2023 10:06 AM  ED Bed Assignment:  10/10    The nursing notes within the ED encounter and vital signs as below have been reviewed. Patient Vitals for the past 24 hrs:   BP Pulse Resp SpO2 Height Weight   01/09/23 1500 129/85 54 13 96 % -- --   01/09/23 1445 -- 59 17 96 % -- --   01/09/23 1430 136/89 57 18 97 % -- --   01/09/23 1415 -- 55 17 95 % -- --   01/09/23 1400 128/89 58 23 96 % -- --   01/09/23 1221 136/89 62 16 97 % -- --   01/09/23 1006 (!) 176/139 60 17 98 % 5' 11\" (1.803 m) 260 lb (117.9 kg)       Oxygen Saturation Interpretation: Normal        Counseling:  I have spoken with the patient and discussed todays results, in addition to providing specific details for the plan of care and counseling regarding the diagnosis and prognosis. Their questions are answered at this time and they are agreeable with the plan of admission. Diagnosis:  1. Cerebrovascular accident (CVA) due to occlusion of left middle cerebral artery ([facility]        Disposition:  Patient's disposition: Admit to neuro ICU  Patient's condition is stable and improving. Attending was present and available throughout encounter including all critical portions;  See Attending Note/Attestation for Final [address]   Resident  01/09/23 0513

## 2023-01-10 ENCOUNTER — APPOINTMENT (OUTPATIENT)
Dept: NUCLEAR MEDICINE | Age: 63
End: 2023-01-10
Payer: COMMERCIAL

## 2023-01-10 ENCOUNTER — APPOINTMENT (OUTPATIENT)
Dept: NON INVASIVE DIAGNOSTICS | Age: 63
End: 2023-01-10
Payer: COMMERCIAL

## 2023-01-10 PROBLEM — I48.0 PAROXYSMAL ATRIAL FIBRILLATION (HCC): Status: ACTIVE | Noted: 2023-01-10

## 2023-01-10 LAB
ACETAMINOPHEN LEVEL: <5 MCG/ML (ref 10–30)
ALBUMIN SERPL-MCNC: 3.7 G/DL (ref 3.5–5.2)
ALBUMIN SERPL-MCNC: 4.1 G/DL (ref 3.5–5.2)
ALP BLD-CCNC: 57 U/L (ref 40–129)
ALP BLD-CCNC: 63 U/L (ref 40–129)
ALT SERPL-CCNC: 14 U/L (ref 0–40)
ALT SERPL-CCNC: 16 U/L (ref 0–40)
AMPHETAMINE SCREEN, URINE: NOT DETECTED
ANION GAP SERPL CALCULATED.3IONS-SCNC: 11 MMOL/L (ref 7–16)
ANION GAP SERPL CALCULATED.3IONS-SCNC: 14 MMOL/L (ref 7–16)
AST SERPL-CCNC: 29 U/L (ref 0–39)
AST SERPL-CCNC: 32 U/L (ref 0–39)
BARBITURATE SCREEN URINE: NOT DETECTED
BASOPHILS ABSOLUTE: 0.05 E9/L (ref 0–0.2)
BASOPHILS ABSOLUTE: 0.06 E9/L (ref 0–0.2)
BASOPHILS RELATIVE PERCENT: 0.7 % (ref 0–2)
BASOPHILS RELATIVE PERCENT: 0.8 % (ref 0–2)
BENZODIAZEPINE SCREEN, URINE: NOT DETECTED
BILIRUB SERPL-MCNC: 0.2 MG/DL (ref 0–1.2)
BILIRUB SERPL-MCNC: <0.2 MG/DL (ref 0–1.2)
BILIRUBIN DIRECT: <0.2 MG/DL (ref 0–0.3)
BILIRUBIN DIRECT: <0.2 MG/DL (ref 0–0.3)
BILIRUBIN, INDIRECT: ABNORMAL MG/DL (ref 0–1)
BILIRUBIN, INDIRECT: NORMAL MG/DL (ref 0–1)
BUN BLDV-MCNC: 18 MG/DL (ref 6–23)
BUN BLDV-MCNC: 18 MG/DL (ref 6–23)
CALCIUM IONIZED: 1.26 MMOL/L (ref 1.15–1.33)
CALCIUM IONIZED: 1.27 MMOL/L (ref 1.15–1.33)
CALCIUM SERPL-MCNC: 8.7 MG/DL (ref 8.6–10.2)
CALCIUM SERPL-MCNC: 9.2 MG/DL (ref 8.6–10.2)
CANNABINOID SCREEN URINE: NOT DETECTED
CHLORIDE BLD-SCNC: 103 MMOL/L (ref 98–107)
CHLORIDE BLD-SCNC: 104 MMOL/L (ref 98–107)
CHOLESTEROL, TOTAL: 143 MG/DL (ref 0–199)
CO2: 22 MMOL/L (ref 22–29)
CO2: 22 MMOL/L (ref 22–29)
COCAINE METABOLITE SCREEN URINE: NOT DETECTED
CREAT SERPL-MCNC: 1.2 MG/DL (ref 0.7–1.2)
CREAT SERPL-MCNC: 1.2 MG/DL (ref 0.7–1.2)
EKG ATRIAL RATE: 66 BPM
EKG P AXIS: 59 DEGREES
EKG P-R INTERVAL: 154 MS
EKG Q-T INTERVAL: 442 MS
EKG QRS DURATION: 134 MS
EKG QTC CALCULATION (BAZETT): 463 MS
EKG R AXIS: -69 DEGREES
EKG T AXIS: 26 DEGREES
EKG VENTRICULAR RATE: 66 BPM
EOSINOPHILS ABSOLUTE: 0.27 E9/L (ref 0.05–0.5)
EOSINOPHILS ABSOLUTE: 0.32 E9/L (ref 0.05–0.5)
EOSINOPHILS RELATIVE PERCENT: 3.8 % (ref 0–6)
EOSINOPHILS RELATIVE PERCENT: 4.2 % (ref 0–6)
ETHANOL: <10 MG/DL (ref 0–0.08)
FENTANYL SCREEN, URINE: NOT DETECTED
GFR SERPL CREATININE-BSD FRML MDRD: >60 ML/MIN/1.73
GFR SERPL CREATININE-BSD FRML MDRD: >60 ML/MIN/1.73
GLUCOSE BLD-MCNC: 114 MG/DL (ref 74–99)
GLUCOSE BLD-MCNC: 95 MG/DL (ref 74–99)
HBA1C MFR BLD: 5.8 % (ref 4–5.6)
HCT VFR BLD CALC: 34.8 % (ref 37–54)
HCT VFR BLD CALC: 35.2 % (ref 37–54)
HDLC SERPL-MCNC: 29 MG/DL
HEMOGLOBIN: 12.1 G/DL (ref 12.5–16.5)
HEMOGLOBIN: 12.8 G/DL (ref 12.5–16.5)
IMMATURE GRANULOCYTES #: 0.01 E9/L
IMMATURE GRANULOCYTES #: 0.01 E9/L
IMMATURE GRANULOCYTES %: 0.1 % (ref 0–5)
IMMATURE GRANULOCYTES %: 0.1 % (ref 0–5)
LDL CHOLESTEROL CALCULATED: 65 MG/DL (ref 0–99)
LV EF: 63 %
LV EF: 74 %
LVEF MODALITY: NORMAL
LVEF MODALITY: NORMAL
LYMPHOCYTES ABSOLUTE: 2.96 E9/L (ref 1.5–4)
LYMPHOCYTES ABSOLUTE: 3.44 E9/L (ref 1.5–4)
LYMPHOCYTES RELATIVE PERCENT: 41.9 % (ref 20–42)
LYMPHOCYTES RELATIVE PERCENT: 44.7 % (ref 20–42)
Lab: NORMAL
MAGNESIUM: 1.7 MG/DL (ref 1.6–2.6)
MAGNESIUM: 1.7 MG/DL (ref 1.6–2.6)
MCH RBC QN AUTO: 29 PG (ref 26–35)
MCH RBC QN AUTO: 29.3 PG (ref 26–35)
MCHC RBC AUTO-ENTMCNC: 34.8 % (ref 32–34.5)
MCHC RBC AUTO-ENTMCNC: 36.4 % (ref 32–34.5)
MCV RBC AUTO: 80.5 FL (ref 80–99.9)
MCV RBC AUTO: 83.5 FL (ref 80–99.9)
METHADONE SCREEN, URINE: NOT DETECTED
MONOCYTES ABSOLUTE: 0.23 E9/L (ref 0.1–0.95)
MONOCYTES ABSOLUTE: 0.34 E9/L (ref 0.1–0.95)
MONOCYTES RELATIVE PERCENT: 3.3 % (ref 2–12)
MONOCYTES RELATIVE PERCENT: 4.4 % (ref 2–12)
NEUTROPHILS ABSOLUTE: 3.52 E9/L (ref 1.8–7.3)
NEUTROPHILS ABSOLUTE: 3.55 E9/L (ref 1.8–7.3)
NEUTROPHILS RELATIVE PERCENT: 45.8 % (ref 43–80)
NEUTROPHILS RELATIVE PERCENT: 50.2 % (ref 43–80)
OPIATE SCREEN URINE: NOT DETECTED
OXYCODONE URINE: NOT DETECTED
PDW BLD-RTO: 13.5 FL (ref 11.5–15)
PDW BLD-RTO: 13.6 FL (ref 11.5–15)
PHENCYCLIDINE SCREEN URINE: NOT DETECTED
PHOSPHORUS: 2.9 MG/DL (ref 2.5–4.5)
PHOSPHORUS: 3.1 MG/DL (ref 2.5–4.5)
PLATELET # BLD: 212 E9/L (ref 130–450)
PLATELET # BLD: 214 E9/L (ref 130–450)
PMV BLD AUTO: 10.8 FL (ref 7–12)
PMV BLD AUTO: 11 FL (ref 7–12)
POTASSIUM SERPL-SCNC: 4.1 MMOL/L (ref 3.5–5)
POTASSIUM SERPL-SCNC: 4.3 MMOL/L (ref 3.5–5)
RBC # BLD: 4.17 E12/L (ref 3.8–5.8)
RBC # BLD: 4.37 E12/L (ref 3.8–5.8)
SALICYLATE, SERUM: <0.3 MG/DL (ref 0–30)
SODIUM BLD-SCNC: 137 MMOL/L (ref 132–146)
SODIUM BLD-SCNC: 139 MMOL/L (ref 132–146)
T4 FREE: 0.96 NG/DL (ref 0.93–1.7)
TOTAL PROTEIN: 6.2 G/DL (ref 6.4–8.3)
TOTAL PROTEIN: 6.7 G/DL (ref 6.4–8.3)
TRICYCLIC ANTIDEPRESSANTS SCREEN SERUM: NEGATIVE NG/ML
TRIGL SERPL-MCNC: 246 MG/DL (ref 0–149)
TROPONIN, HIGH SENSITIVITY: 16 NG/L (ref 0–11)
TSH SERPL DL<=0.05 MIU/L-ACNC: 5.19 UIU/ML (ref 0.27–4.2)
VLDLC SERPL CALC-MCNC: 49 MG/DL
WBC # BLD: 7.1 E9/L (ref 4.5–11.5)
WBC # BLD: 7.7 E9/L (ref 4.5–11.5)

## 2023-01-10 PROCEDURE — 93016 CV STRESS TEST SUPVJ ONLY: CPT | Performed by: INTERNAL MEDICINE

## 2023-01-10 PROCEDURE — 6370000000 HC RX 637 (ALT 250 FOR IP): Performed by: INTERNAL MEDICINE

## 2023-01-10 PROCEDURE — 3430000000 HC RX DIAGNOSTIC RADIOPHARMACEUTICAL: Performed by: RADIOLOGY

## 2023-01-10 PROCEDURE — 93017 CV STRESS TEST TRACING ONLY: CPT

## 2023-01-10 PROCEDURE — 80061 LIPID PANEL: CPT

## 2023-01-10 PROCEDURE — 93010 ELECTROCARDIOGRAM REPORT: CPT | Performed by: INTERNAL MEDICINE

## 2023-01-10 PROCEDURE — 84100 ASSAY OF PHOSPHORUS: CPT

## 2023-01-10 PROCEDURE — 80143 DRUG ASSAY ACETAMINOPHEN: CPT

## 2023-01-10 PROCEDURE — 93306 TTE W/DOPPLER COMPLETE: CPT

## 2023-01-10 PROCEDURE — 83735 ASSAY OF MAGNESIUM: CPT

## 2023-01-10 PROCEDURE — 2580000003 HC RX 258: Performed by: INTERNAL MEDICINE

## 2023-01-10 PROCEDURE — 82077 ASSAY SPEC XCP UR&BREATH IA: CPT

## 2023-01-10 PROCEDURE — 6360000002 HC RX W HCPCS

## 2023-01-10 PROCEDURE — 80307 DRUG TEST PRSMV CHEM ANLYZR: CPT

## 2023-01-10 PROCEDURE — 99291 CRITICAL CARE FIRST HOUR: CPT | Performed by: INTERNAL MEDICINE

## 2023-01-10 PROCEDURE — 36415 COLL VENOUS BLD VENIPUNCTURE: CPT

## 2023-01-10 PROCEDURE — 83036 HEMOGLOBIN GLYCOSYLATED A1C: CPT

## 2023-01-10 PROCEDURE — 97530 THERAPEUTIC ACTIVITIES: CPT

## 2023-01-10 PROCEDURE — A9500 TC99M SESTAMIBI: HCPCS | Performed by: RADIOLOGY

## 2023-01-10 PROCEDURE — 80076 HEPATIC FUNCTION PANEL: CPT

## 2023-01-10 PROCEDURE — 85025 COMPLETE CBC W/AUTO DIFF WBC: CPT

## 2023-01-10 PROCEDURE — 93018 CV STRESS TEST I&R ONLY: CPT | Performed by: INTERNAL MEDICINE

## 2023-01-10 PROCEDURE — 1200000000 HC SEMI PRIVATE

## 2023-01-10 PROCEDURE — 80179 DRUG ASSAY SALICYLATE: CPT

## 2023-01-10 PROCEDURE — 78452 HT MUSCLE IMAGE SPECT MULT: CPT

## 2023-01-10 PROCEDURE — 99292 CRITICAL CARE ADDL 30 MIN: CPT | Performed by: INTERNAL MEDICINE

## 2023-01-10 PROCEDURE — 80048 BASIC METABOLIC PNL TOTAL CA: CPT

## 2023-01-10 PROCEDURE — 97165 OT EVAL LOW COMPLEX 30 MIN: CPT

## 2023-01-10 PROCEDURE — 84443 ASSAY THYROID STIM HORMONE: CPT

## 2023-01-10 PROCEDURE — 6370000000 HC RX 637 (ALT 250 FOR IP): Performed by: FAMILY MEDICINE

## 2023-01-10 PROCEDURE — 78452 HT MUSCLE IMAGE SPECT MULT: CPT | Performed by: INTERNAL MEDICINE

## 2023-01-10 PROCEDURE — 82330 ASSAY OF CALCIUM: CPT

## 2023-01-10 PROCEDURE — 93005 ELECTROCARDIOGRAM TRACING: CPT | Performed by: INTERNAL MEDICINE

## 2023-01-10 PROCEDURE — 6370000000 HC RX 637 (ALT 250 FOR IP): Performed by: NURSE PRACTITIONER

## 2023-01-10 PROCEDURE — 94664 DEMO&/EVAL PT USE INHALER: CPT

## 2023-01-10 PROCEDURE — 6360000002 HC RX W HCPCS: Performed by: NURSE PRACTITIONER

## 2023-01-10 PROCEDURE — 84484 ASSAY OF TROPONIN QUANT: CPT

## 2023-01-10 PROCEDURE — 84439 ASSAY OF FREE THYROXINE: CPT

## 2023-01-10 RX ORDER — TECHNETIUM TC-99M SESTAMIBI 1 MG/10ML
30 INJECTION INTRAVENOUS
Status: COMPLETED | OUTPATIENT
Start: 2023-01-10 | End: 2023-01-10

## 2023-01-10 RX ORDER — TECHNETIUM TC-99M SESTAMIBI 1 MG/10ML
10.4 INJECTION INTRAVENOUS
Status: COMPLETED | OUTPATIENT
Start: 2023-01-10 | End: 2023-01-10

## 2023-01-10 RX ORDER — MAGNESIUM SULFATE IN WATER 40 MG/ML
2000 INJECTION, SOLUTION INTRAVENOUS ONCE
Status: COMPLETED | OUTPATIENT
Start: 2023-01-10 | End: 2023-01-10

## 2023-01-10 RX ORDER — METOPROLOL SUCCINATE 50 MG/1
50 TABLET, EXTENDED RELEASE ORAL 2 TIMES DAILY
Status: DISCONTINUED | OUTPATIENT
Start: 2023-01-10 | End: 2023-01-11

## 2023-01-10 RX ADMIN — IPRATROPIUM BROMIDE AND ALBUTEROL SULFATE 1 AMPULE: 2.5; .5 SOLUTION RESPIRATORY (INHALATION) at 20:31

## 2023-01-10 RX ADMIN — Medication 30 MILLICURIE: at 13:40

## 2023-01-10 RX ADMIN — METOPROLOL SUCCINATE 50 MG: 50 TABLET, EXTENDED RELEASE ORAL at 19:51

## 2023-01-10 RX ADMIN — ASPIRIN 81 MG: 81 TABLET, COATED ORAL at 08:28

## 2023-01-10 RX ADMIN — Medication 10.4 MILLICURIE: at 10:56

## 2023-01-10 RX ADMIN — ACETAMINOPHEN 650 MG: 325 TABLET ORAL at 08:35

## 2023-01-10 RX ADMIN — ATORVASTATIN CALCIUM 80 MG: 80 TABLET, FILM COATED ORAL at 19:51

## 2023-01-10 RX ADMIN — SODIUM CHLORIDE, PRESERVATIVE FREE 10 ML: 5 INJECTION INTRAVENOUS at 08:30

## 2023-01-10 RX ADMIN — SODIUM CHLORIDE, PRESERVATIVE FREE 10 ML: 5 INJECTION INTRAVENOUS at 19:54

## 2023-01-10 RX ADMIN — CLOPIDOGREL BISULFATE 75 MG: 75 TABLET ORAL at 08:29

## 2023-01-10 RX ADMIN — PANTOPRAZOLE SODIUM 40 MG: 40 TABLET, DELAYED RELEASE ORAL at 05:06

## 2023-01-10 RX ADMIN — REGADENOSON 0.4 MG: 0.08 INJECTION, SOLUTION INTRAVENOUS at 12:38

## 2023-01-10 RX ADMIN — GABAPENTIN 600 MG: 300 CAPSULE ORAL at 08:29

## 2023-01-10 RX ADMIN — MAGNESIUM SULFATE HEPTAHYDRATE 2000 MG: 40 INJECTION, SOLUTION INTRAVENOUS at 06:54

## 2023-01-10 ASSESSMENT — ENCOUNTER SYMPTOMS
WHEEZING: 0
COUGH: 0
ANAL BLEEDING: 0
SORE THROAT: 0
BACK PAIN: 0
STRIDOR: 0
SHORTNESS OF BREATH: 0
COLOR CHANGE: 0
ABDOMINAL DISTENTION: 0
SINUS PRESSURE: 0
CONSTIPATION: 0
SINUS PAIN: 0
VOICE CHANGE: 0
BLOOD IN STOOL: 0
DIARRHEA: 0
ABDOMINAL PAIN: 0
RHINORRHEA: 0
CHOKING: 0
CHEST TIGHTNESS: 0

## 2023-01-10 ASSESSMENT — PAIN SCALES - GENERAL
PAINLEVEL_OUTOF10: 3
PAINLEVEL_OUTOF10: 4
PAINLEVEL_OUTOF10: 7
PAINLEVEL_OUTOF10: 0
PAINLEVEL_OUTOF10: 7

## 2023-01-10 ASSESSMENT — PAIN DESCRIPTION - LOCATION
LOCATION: HEAD

## 2023-01-10 ASSESSMENT — PAIN DESCRIPTION - DESCRIPTORS
DESCRIPTORS: ACHING
DESCRIPTORS: ACHING;DISCOMFORT;DULL
DESCRIPTORS: ACHING

## 2023-01-10 ASSESSMENT — PAIN - FUNCTIONAL ASSESSMENT: PAIN_FUNCTIONAL_ASSESSMENT: ACTIVITIES ARE NOT PREVENTED

## 2023-01-10 ASSESSMENT — PAIN DESCRIPTION - PAIN TYPE: TYPE: ACUTE PAIN

## 2023-01-10 ASSESSMENT — PAIN DESCRIPTION - ORIENTATION: ORIENTATION: ANTERIOR

## 2023-01-10 NOTE — PROGRESS NOTES
[address]       [facility]:                                                               Patient Name: Sara Cao  MRN: [id number]  : 1960  Room: [address] [place] [address]    Evaluating OT: CALVIN Kinney,  OTR/L; [id number]    Referring Provider: Gavin Kruger MD   Specific Provider Orders/Date: OT eval and treat (23)       Diagnosis: Cerebrovascular accident (CVA) due to occlusion of left middle cerebral artery ([facility] [address] [I63.512]  Stroke due to occlusion of left middle cerebral artery ([facility] [address] [L76.589]     Reason for admission: Pt admitted with L MCA occlusion. Surgery/Procedures: None this admission     Pertinent Medical History:    Past Medical History:   Diagnosis Date    Anemia     Cancer of kidney ([address]     left    DVT of leg (deep venous thrombosis) (HCC)     right    Prostate cancer (HCC)     Solitary kidney     Testicular cancer (HCC)         *Precautions:  Fall Risk, mild aphasia    Assessment of current deficits   [x] Functional mobility  [x]ADLs  [x] Strength               []Cognition   [x] Functional transfers   [x] IADLs         [x] Safety Awareness   [x]Endurance   [] Fine Coordination        [] ROM     [] Vision/perception   []Sensation    []Gross Motor Coordination [x] Balance   [] Delirium                  []Motor Control     [] Communication    OT PLAN OF CARE   OT POC based on physician orders, patient diagnosis and results of clinical assessment.        Frequency/Duration: 1-3 days/wk for 1-2 weeks PRN    Specific OT Treatment Interventions to include:   * Instruction/training on adapted ADL techniques and AE recommendations to increase functional independence within precautions       * Training on energy conservation strategies, correct breathing pattern and techniques to improve independence/tolerance for self-care routine  * Functional transfer/mobility training/DME recommendations for increased independence, safety, and fall prevention  * Patient/Family education to increase follow through with safety techniques and functional independence  * Recommendation of environmental modifications for increased safety with functional transfers/mobility and ADLs  * Therapeutic exercise to improve motor endurance, ROM, and functional strength for ADLs/functional transfers  * Therapeutic activities to facilitate/challenge dynamic balance, stand tolerance for increased safety and independence with ADLs  * Therapeutic activities to facilitate gross/fine motor skills for increased independence with ADLs  * Delirium prevention/treatment    Modified [place] Scale   Score     Description  0             No symptoms  1             No significant disability despite symptoms  2             Slight disability; able to look after own affairs  3             Moderate disability; able to ambulate without assist/ requires assist with ADLs  4             Moderate/Severe disability;requires assist to ambulate/assist with ADLs  5             Severe disability;bedridden/incontinent   6               Score:   1    Recommended Adaptive Equipment: TBD     Unable to obtain social hx d/t physician arriving in room to discuss patient care. Pt to stress test following session. Home Living: Pt lives with ?  in a ? with ? step(s) to enter and ? rail(s); bed/bath on ? Bathroom setup: ?  Equipment owned: ?    Prior Level of Function: Ind with ADLs; Ind with IADLs. No AD for functional mobility. Driving: Yes  Occupation: ?    Pain Level: pt c/o no pain this session    Cognition: A&O: 4/4; Follows multi step commands   Memory: G; good recall of events leading up to hospitalization. Comprehension: G   Problem solving: G   Judgement/safety: G               Communication skills: WFL; pt reporting intermittent difficulty with word finding.            Vision: Good tracking/scanning noted               Glasses: Yes                                                   Hearing: WFL     RASS: 0  CAM-ICU: (NT) Delirium    UE Assessment:   Hand Dominance: Right []  Left []     ROM Strength   RUE  WFL 5/5  : Good  FMC: continue to assess  GMC: continue to assess   LUE WFL 5/5  : Good  FMC: continue to assess  GMC: continue to assess     Sensation: C/o slight R sided numbness/tingling in extremities. Tone: WNL  Edema: Unremarkable     Functional Assessment:  AM-PAC Daily Activity Raw Score: 19/24   Initial Eval Status  Date: 1/10/23 Treatment Status  Date:  STGs = LTGs  Time frame: 7-14 days   Feeding Ind                             Grooming SBA                      Ind        UB dressing/bathing SBA                      Ind       LB dressing/bathing SBA                      Ind        Toileting SBA                      Ind     Bed Mobility  Supine to sit:   NT    Sit to supine:   NT    Pt seated in chair upon exit                        Ind     Functional Transfers Sit to stand:   SBA    Stand to sit:   SBA    Stand Pivot:   NT                      Ind     Functional Mobility SBA with no AD  For short distance around room. Ind        Balance Sitting:     Static:  S    Dynamic:SBA  Standing: SBA  Sitting:     Static:  Ind    Dynamic:Ind  Standing: Ind                   Endurance/Activity Tolerance   Good- tolerance with light activity. WFL  For full ADL/IADL tasks   Visual/  Perceptual   WFL                     Vitals:   HR at rest: 79 bpm HR at end of session: -- bpm   SpO2 at rest: --% SpO2 at end of session --%   BP at rest: 142/87 mmHg BP at end of session --/-- mmHg   *Pt with physician/nursing staff upon room exit.     Treatment: OT treatment provided this date includes:     Instruction/training on safe bed mobility/functional mobility/transfer techniques: with focus on safety, body mechanics, and precautions  Skilled Monitoring of Vitals: to include BP, spO2, and HR throughout session to maximize safety. Sitting/Standing Balance/Tolerance: to increase balance and activity tolerance during ADLs and facilitate proper posture and positioning. Therapeutic activity: to challenge dynamic sitting/standing balance and endurance to promote safety during ADL tasks and functional transfers and mobility. Delirium Prevention: Environmental and sensory modifications assessed and implemented to decrease ICU acquired delirium and to improve overall orientation, mentation and pt interaction with family/staff. Line management and environmental modifications made prior to and end of session to ensure patient safety and to increase efficiency of session. Skilled monitoring of HR, O2 saturation, blood pressure and patient's response to activity performed throughout session. Comments: Pt case discussed in rounds, OK from RN to see patient. Upon arrival, patient seated at EOB. Pt pleasant and agreeable to participate in therapy session. Pt demo good- tolerance with good understanding of education/techniques. Pt with good self-monitoring skills/attention to environment/safety. At end of session, patient properly positioned in bedside chair with call light within reach, all lines and tubes intact. Pt instructed on use of call light for assistance and fall prevention. Nursing notified of patient positioning. Patient presents with decreased ROM/strength, activity tolerance, dynamic balance, functional mobility limiting completion of ADLs and safety. Pt can benefit from continued skilled OT services to increase safety, functional independence and quality of life. Rehab Potential: Good for established goals    Patient / Family Goal: to return to PLOF    Patient and/or family were instructed/educated on diagnosis, prognosis/goals and plan of care. Patient demonstrated good understanding.       Evaluation Complexity: Low    [] Malnutrition indicators have been identified and nursing has been notified to ensure a dietitian consult is ordered. Time PY:2621             Time Out: 0935         Total Treatment time: 15 min   Min Units   OT Eval Low 27721 X    OT Eval Medium 25386     OT Eval High 14867     OT Re-Eval [id number]     Therapeutic Ex 15067     Therapeutic Activities 50412 15 1   ADL/Self Care 28575     Orthotic Management 84803     Neuro Re-Ed 24790     Non-Billable Time 10       Evaluation time includes thorough review of current medical information, gathering information on past medical history/social history and prior level of function, completion of standardized testing/informal observation of tasks, assessment of data and development of POC/Goals.      CALVIN Burden,  OTR/L; [id number]

## 2023-01-10 NOTE — PROGRESS NOTES
Attempted ongoing Speech-Language Pathology intervention for Clinical Assessment of Swallowing Function. Order received, chart reviewed, Pt unavailable at this time due to:  [] HOLD per RN/ medical staff d/t medical status   [x] Off unit for testing/ procedure    [] With medical staff   [] Declined intervention  [] Sleeping/ Lethargic   [] Other:       SLP attempted evaluation x 2. Patient off floor both attempts. SLP to re-attempt as able.       Pérez Parrish M.S., [address]  Pathologist  [facility] [place] [phone]

## 2023-01-10 NOTE — CONSULTS
[facility]  Internal Medicine Residency Program  Consult  MICU    Patient:  Mason Marquez 65 y.o. male MRN: [id number]     Date of Service: 1/10/2023    Hospital Day: 2      Chief complaint:   History of Present Illness   The patient is a 65 y.o. male with past medical history of atrial fibrillation with RVR, renal cell cancer s/p left nephrectomy at [facility] 12/2019, DVT left leg, prostate carcinoma s/p chemo, testicular cancer s/p surgical removal, PE, DVT, bladder cancer likely metastatic and peripheral neuropathy presented to the ED with complains of right-sided hand weakness, decreased sensation in the right side of the face and slurring of speech since the morning. The patient stated that he was well till 4:30 AM in the morning when he got up to go to the bathroom and slept again. Upon waking up at 6:30 AM in the morning the patient was unable to turn off the alarm clock and later on struggled to open the vitamins bottle and said that he had right arm weakness. The patient also slipped his coffee and later at around 8 AM he started having slurring speech and could not attend the meeting by his office. The patient stated that he felt decreased sensation in the right side of the face, weakness of his right hand and slurring of speech. Patient denied of having headache, blurred vision, facial deviation, chest pain, palpitation, shortness of breath, nausea, vomiting, abdominal pain and had a normal bowel and bladder habit. The patient did not complain of any leg swelling. The patient has a past medical history of atrial fibrillation for which he is not on any DOAC. The patient was diagnosed with RCC on 2019 and he underwent left nephrectomy at [facility]. The patient was then diagnosed with metastatic mass in bladder and ureter on 2019 and has been regularly taking chemotherapy.  About 1 week back patient did have interval resection of bladder mass along with chemotherapy so aspirin was held during this 1 week. Due to worsening neurological function the patient was brought down to the ED. ED Course: On presentation to ED the patient was hypertensive with blood pressure of 176/139, pulse 60 and regular, respiratory rate of 16 and saturating 98% on room air. All relevant investigations were sent. CT brain perfusion study showed ischemic penumbra in the left parietal lobe, CTA head and neck showed left M3 occlusion and mild atherosclerotic disease. MRI head showed no acute intracranial abnormality and no acute infarct seen. The patient was out of the window for BJ infusion so was given with aspirin 2025 mg, Lipitor 80 mg, Plavix 200 mg, labetalol 10 mg, Toprol 25 mg and Lovenox 30 milligrams SC. Symptomatically the patient improved so was transferred to MICU for further observation and management. Past Medical History:      Diagnosis Date    Anemia     Cancer of kidney ([address]     left    DVT of leg (deep venous thrombosis) (HCC)     right    Prostate cancer ([facility]     Solitary kidney     Testicular cancer ([facility]        Past Surgical History:        Procedure Laterality Date    COLONOSCOPY      SHOULDER SURGERY      TESTICLE REMOVAL Right     WISDOM TOOTH EXTRACTION         Medications Prior to Admission:    Prior to Admission medications    Medication Sig Start Date End Date Taking?  Authorizing Provider   aspirin 81 MG EC tablet Take 81 mg by mouth daily   Yes Historical Provider, MD   calcium carbonate (CALTRATE 600) 1500 (600 Ca) MG TABS tablet Take 600 mg by mouth daily   Yes Historical Provider, MD   metoprolol succinate (TOPROL XL) 200 MG extended release tablet Take 200 mg by mouth daily   Yes Historical Provider, MD   Multiple Vitamin (MULTI VITAMIN DAILY) TABS Take 1 tablet by mouth daily    Historical Provider, MD   atorvastatin (LIPITOR) 10 MG tablet Take 10 mg by mouth at bedtime    Historical Provider, MD   gabapentin (NEURONTIN) 300 MG capsule Take 600 mg by mouth 2 times daily.    Historical Provider, MD   omeprazole (PRILOSEC) 40 MG delayed release capsule Take 40 mg by mouth daily    Historical Provider, MD   vitamin D (CHOLECALCIFEROL) 50 MCG (2000 UT) TABS tablet Take 2,000 Units by mouth daily    Historical Provider, MD       Allergies:  Augmentin [amoxicillin-pot clavulanate]    Social History:   TOBACCO:   reports that he quit smoking about 3 years ago. His smoking use included cigarettes. He has a 12.50 pack-year smoking history. He has never used smokeless tobacco.  ETOH:   reports that he does not currently use alcohol. OCCUPATION:     Family History:       Problem Relation Age of Onset    Hypertension Father        REVIEW OF SYSTEMS:  Review of Systems   Constitutional:  Negative for activity change, chills, diaphoresis, fatigue and fever. HENT:  Negative for ear discharge, ear pain, hearing loss, postnasal drip, rhinorrhea, sinus pressure, sinus pain, sore throat and voice change. Respiratory:  Negative for cough, choking, chest tightness, shortness of breath, wheezing and stridor. Cardiovascular:  Negative for chest pain, palpitations and leg swelling. Gastrointestinal:  Negative for abdominal distention, abdominal pain, anal bleeding, blood in stool, constipation and diarrhea. Endocrine: Negative for cold intolerance, polyphagia and polyuria. Genitourinary:  Negative for dysuria, enuresis, flank pain, hematuria, scrotal swelling and testicular pain. Musculoskeletal:  Negative for back pain, gait problem, joint swelling, myalgias, neck pain and neck stiffness. Skin:  Negative for color change, pallor, rash and wound. Neurological:  Positive for numbness. Negative for dizziness, tremors, syncope, facial asymmetry, weakness, light-headedness and headaches. Hematological:  Negative for adenopathy. Does not bruise/bleed easily. Psychiatric/Behavioral:  Negative for confusion, decreased concentration, dysphoric mood and hallucinations.  The patient is not nervous/anxious and is not hyperactive. Physical Exam   Vitals: BP (!) 169/98   Pulse 72   Temp 99 °F (37.2 °C) (Oral)   Resp 20   Ht 5' 11\" (1.803 m)   Wt 260 lb (117.9 kg)   SpO2 95%   BMI 36.26 kg/m²         Physical Exam:  Vitals: /80   Pulse 67   Temp 99 °F (37.2 °C) (Oral)   Resp 19   Ht 5' 11\" (1.803 m)   Wt 260 lb (117.9 kg)   SpO2 95%   BMI 36.26 kg/m²     I & O - 24hr: I/O this shift:  In: -   Out: 275 [Urine:275]   General Appearance: alert, appears stated age, and cooperative  HEENT:  Head: Normocephalic, no lesions, without obvious abnormality. Neck: no adenopathy, no carotid bruit, no JVD, supple, symmetrical, trachea midline, and thyroid not enlarged, symmetric, no tenderness/mass/nodules  Lung: clear to auscultation bilaterally  Heart: regular rate and rhythm, S1, S2 normal, no murmur, click, rub or gallop  Abdomen: soft, non-tender; bowel sounds normal; no masses,  no organomegaly  Extremities:  extremities normal, atraumatic, no cyanosis or edema  Musculokeletal: No joint swelling, no muscle tenderness. ROM normal in all joints of extremities.    Neurologic: Mental status: Alert, oriented, thought content appropriate,    Cranial nerves: II to XII within normal range   Sensory:decreased touch and pain sensation in the right side of the face,    Motor: power 5/5 in all muscle group              Cerebellar: Could do nose to finger and heel shin test        Lines     site day    Art line   None    TLC None    PICC None    Hemoaccess None    Oxygen:    None    ABG:   No results found for: PHART, PH, SMN2CRF, PCO2, PO2ART, PO2, FDU2BDD, HCO3, BEART, BE, THGBART, THB, JJW1GGX, W5KTAMBM, O2SAT  Labs and Imaging Studies   Basic Labs  CBC:   Lab Results   Component Value Date/Time    WBC 7.7 01/10/2023 12:06 AM    RBC 4.37 01/10/2023 12:06 AM    HGB 12.8 01/10/2023 12:06 AM    HCT 35.2 01/10/2023 12:06 AM    MCV 80.5 01/10/2023 12:06 AM    RDW 13.5 01/10/2023 12:06 AM  01/10/2023 12:06 AM     BMP:    Lab Results   Component Value Date/Time     01/10/2023 12:06 AM    K 4.3 01/10/2023 12:06 AM    K 4.2 2020 08:22 AM     01/10/2023 12:06 AM    CO2 22 01/10/2023 12:06 AM    BUN 18 01/10/2023 12:06 AM     CMP:  Lab Results   Component Value Date/Time     01/10/2023 12:06 AM    K 4.3 01/10/2023 12:06 AM    K 4.2 2020 08:22 AM     01/10/2023 12:06 AM    CO2 22 01/10/2023 12:06 AM    BUN 18 01/10/2023 12:06 AM    PROT 6.7 01/10/2023 12:06 AM     U/A:  No components found for: Phylliss Boys, USPGRAV, UPH, Hugo Kilpatrick, UKETONE, UBILI, UBLOOD, UNITRITE, UUROBIL, ULEUKEST, USQEPI, URENEPI, UWBC, URBC, Synchari, UHYALINE  PT/INR:  No results found for: PTINR  VITAMIN B12: No components found for: B12  FOLATE:  No results found for: FOLATE    Imaging Studies:     CT Head W/O Contrast    Result Date: 2023  Patient MRN:  [id number] : 1960 Age: 65 years Gender: Male Order Date:  2023 10:24 AM EXAM: CT HEAD WO CONTRAST NUMBER OF IMAGES:  3 5 INDICATION:  dysarthria dysarthria Decision Support Exception - unselect if not a suspected or confirmed emergency medical condition->Emergency Medical Condition (MA) What reading provider will be dictating this exam?->[facility] COMPARISON: None Technique: Low-dose CT  acquisition technique included one of following options; 1 . Automated exposure control, 2. Adjustment of MA and or KV according to patient's size or 3. Use of iterative reconstruction. Multiple CT sections were obtained with sagittal and coronal MPR reconstructions. The ventricles are prominent. The gyri and sulci appear  prominent. The white matter appears  prominent. There is no evidence for hemorrhage. There is no infarct identified. There is no mass effect identified. There is no mass identified. Diffuse atrophy likely age related Findings compatible with small vessel ischemic changes.  Findings were called to time of dictation to Dr. Ramon Yi     XR CHEST PORTABLE    Result Date: 2023  EXAMINATION: ONE XRAY VIEW OF THE CHEST 2023 10:39 am COMPARISON: None. HISTORY: ORDERING SYSTEM PROVIDED HISTORY: dysarthria TECHNOLOGIST PROVIDED HISTORY: Reason for exam:->dysarthria What reading provider will be dictating this exam?->[facility] FINDINGS: The lungs are without acute focal process. There is no effusion or pneumothorax. The cardiomediastinal silhouette is without acute process. The osseous structures are without acute process. No acute process. CTA NECK W CONTRAST    Result Date: 2023  Patient MRN:  [id number] : 1960 Age: 65 years Gender: Male Order Date:  2023 10:24 AM EXAM: CTA NECK W CONTRAST, CTA HEAD W CONTRAST NUMBER OF IMAGES:  773 INDICATION:   Decision Support Exception - unselect if not a suspected or confirmed emergency medical condition->Emergency Medical Condition (MA) What reading provider will be dictating this exam?->[facility] COMPARISON: None Technique: Low-dose CT  acquisition technique included one of following options; 1 . Automated exposure control, 2. Adjustment of MA and or KV according to patient's size or 3. Use of iterative reconstruction. Contiguous spiral images were obtained in the axial plane, following the administration of intravenous contrast using CT angiographic protocol. Sagittal and coronal images were reconstructed from the axial plane acquisition. Additional MIP reconstructions were presented to aid in the interpretation of this study. Images were obtained from the skull base cranially. There is mild calcified plaque identified in the vessels compatible with atherosclerotic disease.  The right carotid is mildly atherosclerotic without significant stenosis The left carotid is mildly atherosclerotic without significant stenosis The right vertebral artery is unremarkable The left vertebral artery is unremarkable The basilar artery is unremarkable In the distribution of the middle cerebral artery on the left there is a questionable left M3 occlusion. The middle cerebral arteries are otherwise unremarkable. The anterior cerebral arteries are unremarkable The posterior cerebral arteries are unremarkable     1. Estimated stenosis of the proximal right and left internal carotid artery by NASCET criteria is not hemodynamically significant 2. Mild atherosclerotic disease . 3. Left M3 occlusion. This study was analyzed by the [address] ai algorithm. CT BRAIN PERFUSION    Result Date: 2023  Patient MRN: [id number] : 1960 Age:  65 years Gender: Male Order Date: 2023 10:24 AM Exam: CT BRAIN PERFUSION Number of Images: 823 views Indication:   dysarthria dysarthria Decision Support Exception - unselect if not a suspected or confirmed emergency medical condition->Emergency Medical Condition (MA) What reading provider will be dictating this exam?->[facility] Comparison: None. Findings: Perfusion images demonstrate symmetric blood volume Blood flow images demonstrate asymmetric blood flow There is a region of ischemic penumbra in the left parietal lobe. There is no significant core infarct identified. Findings compatible with ischemic penumbra in the left parietal lobe This study was analyzed by the [employer] algorithm. CTA HEAD W CONTRAST    Result Date: 2023  Patient MRN:  [id number] : 1960 Age: 65 years Gender: Male Order Date:  2023 10:24 AM EXAM: CTA NECK W CONTRAST, CTA HEAD W CONTRAST NUMBER OF IMAGES:  773 INDICATION:   Decision Support Exception - unselect if not a suspected or confirmed emergency medical condition->Emergency Medical Condition (MA) What reading provider will be dictating this exam?->[facility] COMPARISON: None Technique: Low-dose CT  acquisition technique included one of following options; 1 . Automated exposure control, 2. Adjustment of MA and or KV according to patient's size or 3. Use of iterative reconstruction.  Contiguous spiral images were obtained in the axial plane, following the administration of intravenous contrast using CT angiographic protocol. Sagittal and coronal images were reconstructed from the axial plane acquisition. Additional MIP reconstructions were presented to aid in the interpretation of this study. Images were obtained from the skull base cranially. There is mild calcified plaque identified in the vessels compatible with atherosclerotic disease. The right carotid is mildly atherosclerotic without significant stenosis The left carotid is mildly atherosclerotic without significant stenosis The right vertebral artery is unremarkable The left vertebral artery is unremarkable The basilar artery is unremarkable In the distribution of the middle cerebral artery on the left there is a questionable left M3 occlusion. The middle cerebral arteries are otherwise unremarkable. The anterior cerebral arteries are unremarkable The posterior cerebral arteries are unremarkable     1. Estimated stenosis of the proximal right and left internal carotid artery by NASCET criteria is not hemodynamically significant 2. Mild atherosclerotic disease . 3. Left M3 occlusion. This study was analyzed by the [address] ai algorithm. MRI BRAIN WO CONTRAST    Result Date: 1/9/2023  EXAMINATION: MRI OF THE BRAIN WITHOUT CONTRAST  1/9/2023 11:40 am TECHNIQUE: Multiplanar multisequence MRI of the brain was performed without the administration of intravenous contrast. COMPARISON: Head CT and CT perfusion study dated 01/09/2023 HISTORY: ORDERING SYSTEM PROVIDED HISTORY: brain perfusion protocol looking for C2 flare changes TECHNOLOGIST PROVIDED HISTORY: Reason for exam:->brain perfusion protocol looking for C2 flare changes Decision Support Exception - unselect if not a suspected or confirmed emergency medical condition->Emergency Medical Condition (MA) What reading provider will be dictating this exam?->CRC FINDINGS: INTRACRANIAL STRUCTURES/VENTRICLES: There is no acute infarct. Specifically, there is no evidence of restricted diffusion in the left parietal lobe corresponding to the area of suspected ischemia seen on the recent CT perfusion study. No mass effect or midline shift. No evidence of an acute intracranial hemorrhage. The ventricles and sulci are normal in size and configuration. The sellar/suprasellar regions appear unremarkable. The normal signal voids within the major intracranial vessels appear maintained. ORBITS: The visualized portion of the orbits demonstrate no acute abnormality. SINUSES: The visualized paranasal sinuses and mastoid air cells demonstrate no acute abnormality. BONES/SOFT TISSUES: The bone marrow signal intensity appears normal. The soft tissues demonstrate no acute abnormality. No evidence of acute intracranial abnormality. No acute infarct seen. RECOMMENDATIONS: Unavailable       EKG: normal sinus rhythm, unchanged from previous tracings.     Resident's Assessment and Plan     Ruthann Dumas is a 65 y.o. male    Neurology     Acute ischemic left parietal lobe stroke   - initially presented with  decreased sensation in the right side of face, slurring of speech and right hand weakness  - was not on ASA 81mg for a week time   - CT  brain perfusion showed ischemic penumbra in the left parietal lobe   - CTA brain and neck showed left M3 occlusion, mild atherosclerotic disease   - Aspirin 325 mg and Plavix 300 mg given, patient out of the TNK window   - Continue on DAPT   - Neurology consulted, follow reccs  - Follow up TSH, HbA1c and Lipid panel  - Follow up ECHO findings      Cardiology    Hx of Paroxysmal A Fib with RVR   - on metoprolol 200 mg QD  - was on ASA 81mg daily at home  - no episodes of palpitations in the past few days   - follows with a cardiologist in [place]  - Was on holter monitor in July 2022 for 1 month and had episodes of paraoxysmal A fib  - Not under DOAC due to bladder cancer and hematuria  - Continue on Metoprolol 200 mg QD  - EP conuslted, follow reccs    Hx of HLD   - on Lipitor 10 mg daily   - Start on 80 MG Lipitor nightly  - Lipid panel sent, follow    Pulmonology     Hx of Pulmonary embolism  - PE after 5 days of left nephrectomy   - was initally kept on Lovenox daily for 4 months and Eliquis 5 mg BID for 2-3 more months   - no any symptoms of swelling and pain in the limbs   - Not under any DOAC currently     Nephrology    Renal cell carcinoma S/P left nephrectomy at [facility] 12/2019  - Robotic left nephrectomy done at [facility] in 12/2019  - was found to have metastatis in the left ureter and bladder   - Patient tolerating well with the single kidney   - Cr 1.3, baseline   - Monitor BMP daily     Heme- Onc    Bladder mass most likely mets from [address]  - diagnosed with bladder mass after left nephrectomy  - on regular interval cystoscopic mass removal and chemotherapy with Gemcitabine EVERY 3-6 monthly   - history of on and off hematuria  - Monitor H and H     Hx of Testicular Cancer S/P Rt testis  - Removed Rt testis in the mid 1990s  - Extensive chemo was done after the surgery     Hx of Prostate Cancer S/P 42 cycles of Chemotherapy  - regular follow up with PSA as per patient   - PSA in the normal range     Hx of DVT left leg   - DVT of the left femoral vein after 5 days of post surgery for left nephrectomy   - was initally kept on Lovenox daily for 4 months and Eliquis 5 mg BID for 2-3 more months   - no any symptoms of swelling and pain in the limbs   - Not under any DOAC currently     MSK     Hx of Peripheral Neuropathy   - On Gabapentin 300 mg BID   - Resume if patient stable          PT/OT evaluation: Not recommended currently  DVT prophylaxis/ GI prophylaxis: Lovenox/Adult diet  Disposition: Admit to MELODY Gupta MD, PGY-1  Attending physician: Dr. Kirsten Sal

## 2023-01-10 NOTE — CARE COORDINATION
Care Coordination: attempted to meet with pt at bedside to discuss transition of care. Pt currently at stress lab. Spoke to wife via phone. States pt is back to baseline, walking around in room. Plan is to return home and feels no need for hhc. States pt was completely independent at home, no assistive devices to ambulate, no home 02. No hx of dme or BELKYS. Hx of outpt therapy, does not recall name. Follows with Dr Gildardo Downey, uses [facility]. Wife to transport home at discharge.   Pt has order to transfer out     Zoe Ford

## 2023-01-10 NOTE — PROCEDURES
[employer] Nuclear Stress Test:    Cardiologist: Dr. Sadaf Barcenas EKG: NSR, RBBB, LAFB, bifascicular block, abnormal EKG    Indications for study: Chest pain    1. No chest pain  2. No new arrhythmias  3. No EKG changes suggestive of stress induced ischemia  4. Nuclear images pending    John Ramos MD., [facility].    [facility]) Cardiology

## 2023-01-10 NOTE — CONSULTS
[facility],[address] and [facility] Electrophysiology  Consultation Report  PATIENT: Vikas Richardson RECORD NUMBER: [id number]  DATE OF SERVICE:  1/10/2023  ATTENDING ELECTROPHYSIOLOGIST: Joe Velez MD  PRIMARY ELECTROPHYSIOLOGIST: Joe Velez MD  REFERRING PHYSICIAN: No ref. provider found and Pamela Neal MD  CHIEF COMPLAINT: Right hand clumsiness and slurred speech    HPI: This is a 65 y.o. male with a history of   Patient Active Problem List   Diagnosis    Cerebrovascular accident (CVA) due to occlusion of left middle cerebral artery ([address]   who presented to the hospital on 1/9/23 complaining of right had clumsiness and slurred speech started at 6.30 AM of 1/9/23 when he woke up. He has history of paroxysmal atrial fibrillation, DVT left leg, PE, renal cell cancer status post left nephrectomy at [facility] on 12/2019, prostate cancer status post chemotherapy, testicular cancer status post removal, bladder cancer, recurrent hematuria and peripheral neuropathy. The patient states that he was diagnosed with paroxysmal atrial fibrillation 3 years ago and was prescribed Metoprolol Succinate which the dose has been increased gradually to currently at 200 mg daily. He reports worsening of palpitations lately and can lasted up to 3 to 4 hours at time. He was prescribed Lovenox, Xarelto and Eliquis in the past for [facility] but all cause recurrent hematuria so he stopped using [facility] for the past 1.5 years. The patient states that he woke up at 4.30 yesterday and felt fine and he went back to bed. When he woke up at 6.30 AM, he noticed right hand clumsiness and slurred speech. He presented to ED and was diagnosed with acute ischemic L M3 CVA which was out of tPA window. He was admitted to ICU for observation. The patient is currently in NSR. He was seen by Neurology. Cardiac electrophysiology service is consulted for PAF and CVA.     Patient Active Problem List    Diagnosis Date Noted    Cerebrovascular accident (CVA) due to occlusion of left middle cerebral artery ([facility] 01/09/2023     Priority: Medium       Past Medical History:   Diagnosis Date    Anemia     Cancer of kidney ([facility]     left    DVT of leg (deep venous thrombosis) ([facility]     right    Prostate cancer ([facility]     Solitary kidney     Testicular cancer ([facility]        Family History   Problem Relation Age of Onset    Hypertension Father        Social History     Tobacco Use    Smoking status: Former     Packs/day: 0.50     Years: 25.00     Pack years: 12.50     Types: Cigarettes     Quit date: 12/2/2019     Years since quitting: 3.1    Smokeless tobacco: Never   Substance Use Topics    Alcohol use: Not Currently       Current Facility-Administered Medications   Medication Dose Route Frequency Provider Last Rate Last Admin    regadenoson (LEXISCAN) injection 0.4 mg  0.4 mg IntraVENous ONCE PRN KARINA Caban - CNP        technetium sestamibi (CARDIOLITE) injection 30 millicurie  30 millicurie IntraVENous ONCE PRN Tim Perez II, MD        [facility]] metoprolol succinate (TOPROL XL) extended release tablet 25 mg  25 mg Oral BID Zackary Rios MD        pantoprazole (PROTONIX) tablet 40 mg  40 mg Oral QAM AC Zackary Rios MD   40 mg at 01/10/23 0506    traMADol (ULTRAM) tablet 50 mg  50 mg Oral Q6H PRN Zackary Rios MD        gabapentin (NEURONTIN) capsule 600 mg  600 mg Oral Daily Zackary Rios MD   600 mg at 01/10/23 0829    enoxaparin Sodium (LOVENOX) injection 30 mg  30 mg SubCUTAneous BID Zackary Rios MD        aspirin EC tablet 81 mg  81 mg Oral Daily Zackary Rios MD   81 mg at 01/10/23 8015    Or    aspirin suppository 300 mg  300 mg Rectal Daily Zackary Rios MD        atorvastatin (LIPITOR) tablet 80 mg  80 mg Oral Nightly Zackary Rios MD        [Held by provider] labetalol (NORMODYNE;TRANDATE) injection 10 mg  10 mg IntraVENous Q10 Min PRN Zackary Rios MD sodium chloride flush 0.9 % injection 5-40 mL  5-40 mL IntraVENous 2 times per day Sara Borer, DO   10 mL at 01/10/23 0830    sodium chloride flush 0.9 % injection 5-40 mL  5-40 mL IntraVENous PRN Rhea L Hernandez, DO        0.9 % sodium chloride infusion   IntraVENous PRN Rhea L Hernandez, DO        ondansetron (ZOFRAN-ODT) disintegrating tablet 4 mg  4 mg Oral Q8H PRN Rhea L Hernandez, DO        Or    ondansetron (ZOFRAN) injection 4 mg  4 mg IntraVENous Q6H PRN Rhea L Hernandez, DO        polyethylene glycol (GLYCOLAX) packet 17 g  17 g Oral Daily PRN Rhea L Hernandez, DO        acetaminophen (TYLENOL) tablet 650 mg  650 mg Oral Q6H PRN Sara Borer, DO   650 mg at 01/10/23 0753    Or    acetaminophen (TYLENOL) suppository 650 mg  650 mg Rectal Q6H PRN Rhea L Hernandez, DO        ipratropium-albuterol (DUONEB) nebulizer solution 1 ampule  1 ampule Inhalation Q4H WA Rhea L Hernandez, DO        perflutren lipid microspheres (DEFINITY) injection 1.5 mL  1.5 mL IntraVENous ONCE PRN Rhea L Hernandez, DO        clopidogrel (PLAVIX) tablet 75 mg  75 mg Oral Daily Rhea L Hernandez, DO   75 mg at 01/10/23 7590        Allergies   Allergen Reactions    Augmentin [Amoxicillin-Pot Clavulanate]      ROS:   Constitutional: Negative for fever, activity change and appetite change. HENT: Negative for epistaxis. Eyes: Negative for diploplia, blurred vision. Respiratory: Negative for cough, chest tightness, shortness of breath and wheezing. Cardiovascular: pertinent positives in HPI  Gastrointestinal: Negative for abdominal pain and blood in stool.    All other review of systems are negative     PHYSICAL EXAM:   Vitals:    01/10/23 0600 01/10/23 0800 01/10/23 0900 01/10/23 1000   BP: 136/84 (!) 142/87  (!) 134/99   Pulse: 60 66 69 66   Resp: 22 17 19 20   Temp:  98.3 °F (36.8 °C)     TempSrc:  Oral     SpO2: 95% 98% 99% 98%   Weight:       Height:          Constitutional: Well-developed, no acute distress  Eyes: conjunctivae normal, no xanthelasma   Ears, Nose, Throat: oral mucosa moist, no cyanosis   CV: no JVD. Regular rate and rhythm. Normal S1S2 and no S3. No murmurs, rubs, or gallops. PMI is nondisplaced  Lungs: clear to auscultation bilaterally, normal respiratory effort without used of accessory muscles  Abdomen: soft, non-tender, bowel sounds present, no masses or hepatomegaly   Musculoskeletal: no digital clubbing, no edema   Skin: warm, no rashes     I have personally reviewed the laboratory, cardiac diagnostic and radiographic testing as outlined below:    Data:    Recent Labs     01/09/23  1020 01/10/23  0006 01/10/23  0407   WBC 6.5 7.7 7.1   HGB 12.9 12.8 12.1*   HCT 38.4 35.2* 34.8*    214 212     Recent Labs     01/09/23  1020 01/10/23  0006 01/10/23  0407    139 137   K 4.3 4.3 4.1    103 104   CO2 23 22 22   BUN 21 18 18   CREATININE 1.3* 1.2 1.2   CALCIUM 9.3 9.2 8.7      Lab Results   Component Value Date/Time    MG 1.7 01/10/2023 04:07 AM     Recent Labs     01/10/23  0006   TSH 5.190*     Recent Labs     01/09/23  1020   INR 1.0       CXR 1/10/23:   FINDINGS:   The lungs are without acute focal process. There is no effusion or   pneumothorax. The cardiomediastinal silhouette is without acute process. The   osseous structures are without acute process. Impression   No acute process. Telemetry 01/10/23 : NSR 70's bpm, no PAF. EKG 1/10/23: NSR 66 bpm, RBBB, LAFB, , Qtc 463 ms. Please see scan in Cardiology. Echocardiogram 10/9/20:   Findings      Left Ventricle   Left ventricle size is normal.   Mild concentric left ventricular hypertrophy. Normal left ventricular systolic function. Ejection fraction is visually estimated at 55-60%. Indeterminate diastolic function. Right Ventricle   Normal right ventricular size and function (TAPSE 1.9 cm). Left Atrium   Normal sized left atrium by volume index.       Right Atrium   Normal sized right atrium. Mitral Valve   Mild mitral regurgitation. No evidence of hemodynamically significant mitral stenosis. Tricuspid Valve   Mild tricuspid regurgitation. PASP is estimated at 20 mmHg (normal estimated PASP). Aortic Valve   No evidence of hemodynamically significant aortic regurgitation or   stenosis. Pulmonic Valve   The pulmonic valve was not well visualized. Pericardial Effusion   No evidence of a hemodynamically significant pericardial effusion. Aorta   Aortic root dimension within normal limits. Conclusions      Summary   Cardiac rhythm: atrial fibrillation with episodes of RVR   Normal left ventricular systolic function. Ejection fraction is visually estimated at 55-60%. Mild concentric left ventricular hypertrophy. Normal right ventricular size and function (TAPSE 1.9 cm). Indeterminate diastolic function. Mild mitral regurgitation. Mild tricuspid regurgitation. PASP is estimated at 20 mmHg (normal estimated PASP). Signature      ----------------------------------------------------------------   Electronically signed by Wallace Pennington MD(Interpreting   physician) on 10/13/2020 06:46 AM   ----------------------------------------------------------------    I have independently reviewed all of the ECGs and rhythm strips per above     Assessment/Plan: This is a 65 y.o. male with a history of   Patient Active Problem List   Diagnosis    Cerebrovascular accident (CVA) due to occlusion of left middle cerebral artery ([address]    who presents with CVA and has history of PAF. 1. Paroxysmal atrial fibrillation.  - Reported history.   - No EKG during AF available for review.   - [id number] = 2 (CVA)  - Current OAC regiment includes ASA, Plavix and SC Lovenox for DVT prophylaxis  - Current medical therapy includes Toprol XL.  - Thus far has had no cardioversion, AAD therapy and ablation  - Previous attempts at rhythm control have been none  - Had an extensive discussion regarding options between rate and rhythm control and the patient has chosen rhythm control  - For rhythm control we have chosen to utilize AAD therapy which depends on stress test and echo results. - Had an extensive discussion regarding all secondary causes of AF which include but are not limited to the following and then need for lifestyle modifications and stringent control of contributing medical conditions which include            - Weight Loss: aggressive weight loss and regular moderate cardiopulmonary exercise to maintain BMI <27 with a loss of at least 10% of their current weight which is safely and adequately maintained            - Exercise: 30min 3-4x/week of at least moderate cardiopulmonary exercise up to 250 min/wk            - Blood pressure: target of <130/80mmHg            - Dyslipidemia: add a statin if LDL >100mg/dL            - Diabetes Mellitus: add Metformin if HbA1c >6.5%            - Obstructive sleep apnea: evaluate for and or treat with CPAP if AHI >30/hour            - Abstinence from alcohol and tobacco products  - Discussed the potential improvement in all atrial fibrillation therapies if diet/exercise and weight loss are achieved per above. 2. Bifascicular block  - RBBB and LAFB. - Since at least 9/3/20 per available EKG. - No high grade AV block seen. 3.  History of DVT left leg and PE  - Provoked. - Post nephrectomy. - treated with Eliquis. 4. Renal cell cancer   - Status post robotic left nephrectomy at [facility] on 12/2019.  - Found to have metastasis in the left ureter and bladder. 5. Prostate cancer   - Status post chemotherapy. 6. Testicular cancer   - Status post removal of right testis mid 1990's. - Status post chemo therapy. 7. Bladder cancer with recurrent hematuria on [address]  - On regular interval cystoscopic mass removal and chemotherapy with Gemcitabine every 3-6 month. 8. Peripheral neuropathy.      9. Acute ischemic L M3 CVA   - Out of tPA window. - CT brain perfusion showed ischemic penumbra in the left parietal lobe. - CTA head and neck showed left M3 occlusion.  - MRI brain showed no evidence of acute intracranial abnormality  - Neurology consulted. Recommendations:  Resume Toprol XL at 50 mg bid. Start Eliquis 5 mg bid if no contraindication when is OK by Neurology. Echocardiogram to evaluate LV function and cardiac structure. Nuclear stress test to exclude significant CAD so can use class IC AAD therapy to treat AF if needed. Consider outpatient cardiac monitor to further evaluate AF burden. Consider AAD therapy if cardiac monitor shows significant AF burden. I have spent a total of 45 CCT minutes with the patient and the family reviewing the above stated recommendations. And a total of >50% of that time involved face-to-face time providing counseling and or coordination of care with the other providers, reviewing records/tests, counseling/education of the patient, ordering medications/tests/procedures, coordinating care, and documenting clinical information in the EHR. Thank you for allowing me to participate in your patient's care. Please call me if there are any questions or concerns.       Jacques James MD  Cardiac Electrophysiology  [address]  [facility]  11:34 AM  1/10/2023

## 2023-01-10 NOTE — PROGRESS NOTES
Hospitalist Progress Note      Synopsis: Patient admitted on 39/2023 71-year-old male past medical history of DVT on lower extremity, solitary kidney with history of kidney cancer, prostate cancer, testicular cancer, atrial fibrillation not on anticoagulation due to bladder cancer bleeding, former smoker presented today due to right-sided weakness. Patient has been off his aspirin for 7 days due to recent bleeding in his bladder. Presenting due to aphasia and right-sided weakness. Woke up 6 AM this morning had some difficulty using his right hand when he tried to open his pill bottles. Wife does notice difficulty with his speech and also numbness and weakness in his right side presents to the ER NIH of 4. From presentation was elevated at 176/39. CT brain perfusion showed concern for findings compatible with ischemic penumbra in the left parietal lobe. CT angio head and neck with left M3 occlusion. ER did speak to neuro interventionalists recommended loading with Plavix and aspirin. When I did see the patient his symptoms had resolved. He mentioned that his right side feels back to normal and speech has returned to normal.  He did mention he has a history of atrial fibrillation but was not on any blood thinner due to his history of bladder cancer. Taken off aspirin a week ago due to a bladder procedure. Subjective    Feeling better without complaint still notes some slight difficulty with speech  No CP or SOB  No fever or chills   No uncontrolled pain  No vomiting or diarrhea   No events reported overnight     Exam:  BP (!) 134/99   Pulse 66   Temp 98.3 °F (36.8 °C) (Oral)   Resp 20   Ht 5' 11\" (1.803 m)   Wt 260 lb (117.9 kg)   SpO2 98%   BMI 36.26 kg/m²   General appearance: No apparent distress, appears stated age and cooperative. HEENT: Pupils equal, round, and reactive to light. Conjunctivae/corneas clear. Neck: Supple. No jugular venous distention. Trachea midline.   Respiratory: Normal respiratory effort. Clear to auscultation, bilaterally without Rales/Wheezes/Rhonchi. Cardiovascular: Regular rate and rhythm with normal S1/S2 without murmurs, rubs or gallops. Abdomen: Soft, non-tender, non-distended with normal bowel sounds. Musculoskeletal: No clubbing, cyanosis or edema bilaterally. Brisk capillary refill. 2+radial pulses.    Skin:  No rashes    Neurologic: awake, alert and following commands    Medications:  Reviewed    Infusion Medications    sodium chloride       Scheduled Medications    [Held by provider] metoprolol succinate  25 mg Oral BID    pantoprazole  40 mg Oral QAM AC    gabapentin  600 mg Oral Daily    enoxaparin  30 mg SubCUTAneous BID    aspirin  81 mg Oral Daily    Or    aspirin  300 mg Rectal Daily    atorvastatin  80 mg Oral Nightly    sodium chloride flush  5-40 mL IntraVENous 2 times per day    ipratropium-albuterol  1 ampule Inhalation Q4H WA    clopidogrel  75 mg Oral Daily     PRN Meds: regadenoson, technetium sestamibi, traMADol, [Held by provider] labetalol, sodium chloride flush, sodium chloride, ondansetron **OR** ondansetron, polyethylene glycol, acetaminophen **OR** acetaminophen, perflutren lipid microspheres    I/O    Intake/Output Summary (Last 24 hours) at 1/10/2023 1229  Last data filed at 1/10/2023 0800  Gross per 24 hour   Intake 60 ml   Output 900 ml   Net -840 ml       Labs:   Recent Labs     01/09/23  1020 01/10/23  0006 01/10/23  0407   WBC 6.5 7.7 7.1   HGB 12.9 12.8 12.1*   HCT 38.4 35.2* 34.8*    214 212       Recent Labs     01/09/23  1020 01/10/23  0006 01/10/23  0407    139 137   K 4.3 4.3 4.1    103 104   CO2 23 22 22   BUN 21 18 18   CREATININE 1.3* 1.2 1.2   CALCIUM 9.3 9.2 8.7   PHOS  --  2.9 3.1       Recent Labs     01/09/23  1020 01/10/23  0006 01/10/23  0407   PROT 6.7 6.7 6.2*   ALKPHOS 64 63 57   ALT 16 16 14   AST 29 32 29   BILITOT 0.3 0.2 <0.2       Recent Labs     01/09/23  1020   INR 1.0       No results for input(s): Tiffany Sosa in the last 72 hours. Chronic labs:  Lab Results   Component Value Date    CHOL 143 01/10/2023    TRIG 246 (H) 01/10/2023    HDL 29 01/10/2023    LDLCALC 65 01/10/2023    TSH 5.190 (H) 01/10/2023    INR 1.0 01/09/2023    LABA1C 5.8 (H) 01/10/2023       Radiology:  Imaging studies reviewed today. ASSESSMENT:    Principal Problem:    Cerebrovascular accident (CVA) due to occlusion of left middle cerebral artery ([address]  Resolved Problems:    * No resolved hospital problems. *     Acute CVA with left parietal lobe CVA  Left M3 occlusion  Hypertensive emergency  History of DVT  History of bladder cancer with solitary kidney  Atrial fibrillation not on anticoagulation        PLAN:  Admitted to ICU-transfer to intermediate care  Aspirin and Plavix   monitor blood pressures   Monitor neuro status  Critical care consult appreciated  Neurology consult.-Discussed case  MRI brain + left parietal ischemic stroke  echocardiogram with bubble study. CT brain perfusion with concern for left parietal lobe CVA. check A1c and lipid panel. PT OT and speech therapy consultation. cardiology consult for history of A. fib --nuclear stress-low risk  -- he did mention was supposed to follow-up with [facility] [place] [facility] at the end of the month for an appointment with a cardiologist as he had a Holter monitor in the picked out several beats of atrial fibrillation as he may need his medications adjusted. Diet: ADULT DIET; Regular  Code Status: Full Code  PT/OT Eval Status:     DVT Prophylaxis:     Recommended disposition at discharge: Anticipate home 24 to 48 hours    +++++++++++++++++++++++++++++++++++++++++++++++++  Sae Jacobsen MD   [facility].  +++++++++++++++++++++++++++++++++++++++++++++++++  NOTE: This report was transcribed using voice recognition software.  Every effort was made to ensure accuracy; however, inadvertent computerized transcription errors may be present.

## 2023-01-10 NOTE — CONSULTS
[address] [address] [address]  Neurology Consult    Date:  1/10/2023  Patient Name:  Roxane Peres  YOB: 1960  MRN: [id number]     PCP:  Radha Nieto MD     Chief Complaint: Aphasia, right-sided weakness     History obtained from: The patient, who was a good historian    Sabine Bishop was a 65year old right-handed man with DVT, RCC of the kidney, s/p left nephrectomy, prostate cancer, testicular cancer, bladder cancer, atrial fibrillation without [address], and peripheral neuropathy. He was admitted for an acute stroke. Imaging studies displayed a left M3 occlusion. Initially, the patient's NIH score was 4. On evaluation today, patient had an NIH score of 0 and exhibited near complete resolution of symptoms. Due to the patient's history of atrial fibrillation without OAC, and the distal occlusion, his stroke was likely cardioembolic. There was a long discussion informing the patient concerning his therapeutic options. [address] had been held for over 1 year due to prolonged bleeding after serial bladder procedures every 3-6 months. The need for [address] vs. a Watchman procedure to lower the risk of future cardioembolic events was discussed. Neurology was also consulted concerning the possibility of resuming anticoagulation despite his recurrent bladder cancer. He was already scheduled to undergo the Watchman procedure next month and [facility], but required that procedure same      Plan:  - Recommend discontinuing clopidogrel and aspirin if patient's Urology team OK with initiation of apixaban  - Recommend attempting to reach out to [facility] to reschedule Watchman procedure for a sooner date    History of Present Illness:  He presented with aphasia and right-sided weakness. On 1/09 at 4:00am, the patient woke to the bathroom and he felt at baseline. At 6:30am when he awoke, he was unable to use the right arm to turn off his alarm or find his glasses.   There is also difficulty opening a bottle. At 8:00am, he attempted to participate in a online meeting for work but was unable to form full sentences and was stuttering. He was told by a co-worker that he should report to the ED because of the slurring . His initial NIH score in the ED was 4. CTA Head/neck showed left M3 occlusion; CT brain perfusion showed concern for ischemic penumbra in the left parietal lobe. He was given loading doses of clopidogrel 300mg and aspirin 325mg. Upon evaluation today, his deficits had dramatically improved; his sensation and strength on the right right side at returned to 95% of baseline. He still noted minor trouble thinking of certain words but was no longer slurring his speech or stuttering. Per patient, he routinely takes a baby aspirin daily but stopped about a week ago due to the bladder procedure. He denied previous similar events. He denied any headaches, visual abnormalities, left-sided involvement, swallowing or chewing difficulties, visual abnormalities, incontinence, seizure-like activity or loss of consciousness. There were no medical issues. Review of systems was otherwise unremarkable.     Surgical History:   Past Surgical History:   Procedure Laterality Date    COLONOSCOPY      SHOULDER SURGERY      TESTICLE REMOVAL Right     WISDOM TOOTH EXTRACTION        Family History:  Family History   Problem Relation Age of Onset    Hypertension Father      Social History:  Social History     Tobacco Use    Smoking status: Former     Packs/day: 0.50     Years: 25.00     Pack years: 12.50     Types: Cigarettes     Quit date: 12/2/2019     Years since quitting: 3.1    Smokeless tobacco: Never   Vaping Use    Vaping Use: Never used   Substance Use Topics    Alcohol use: Not Currently    Drug use: Never        Current Medications:      Current Facility-Administered Medications   Medication Dose Route Frequency Provider Last Rate Last Admin    technetium sestamibi (CARDIOLITE) injection 30 millicurie  30 millicurie IntraVENous ONCE PRN Radha Jimenez MD        [facility] by provider] metoprolol succinate (TOPROL XL) extended release tablet 25 mg  25 mg Oral BID India Morrison MD        pantoprazole (PROTONIX) tablet 40 mg  40 mg Oral QAM AC India Morrison MD   40 mg at 01/10/23 0506    traMADol (ULTRAM) tablet 50 mg  50 mg Oral Q6H PRN India Morrison MD        gabapentin (NEURONTIN) capsule 600 mg  600 mg Oral Daily India Morrison MD   600 mg at 01/10/23 0829    enoxaparin Sodium (LOVENOX) injection 30 mg  30 mg SubCUTAneous BID India Morrison MD        aspirin EC tablet 81 mg  81 mg Oral Daily India Morrison MD   81 mg at 01/10/23 7325    Or    aspirin suppository 300 mg  300 mg Rectal Daily India Morrison MD        atorvastatin (LIPITOR) tablet 80 mg  80 mg Oral Nightly India Morrison MD        [Held by provider] labetalol (NORMODYNE;TRANDATE) injection 10 mg  10 mg IntraVENous Q10 Min PRN India Morrison MD        sodium chloride flush 0.9 % injection 5-40 mL  5-40 mL IntraVENous 2 times per day Basil Buzzard, DO   10 mL at 01/10/23 0830    sodium chloride flush 0.9 % injection 5-40 mL  5-40 mL IntraVENous PRN Rheasa KELLEN Mains, DO        0.9 % sodium chloride infusion   IntraVENous PRN Rhea L Hernandez, DO        ondansetron (ZOFRAN-ODT) disintegrating tablet 4 mg  4 mg Oral Q8H PRN Rhea L Hernandez, DO        Or    ondansetron (ZOFRAN) injection 4 mg  4 mg IntraVENous Q6H PRN Rhea L Hernandez, DO        polyethylene glycol (GLYCOLAX) packet 17 g  17 g Oral Daily PRN Rhea L Hernandez, DO        acetaminophen (TYLENOL) tablet 650 mg  650 mg Oral Q6H PRN Basil Buzzard, DO   650 mg at 01/10/23 2161    Or    acetaminophen (TYLENOL) suppository 650 mg  650 mg Rectal Q6H PRN Rhea L Hernandez, DO        ipratropium-albuterol (DUONEB) nebulizer solution 1 ampule  1 ampule Inhalation Q4H STEFANIE Hernandez,         perflutren lipid microspheres (DEFINITY) injection 1.5 mL  1.5 mL IntraVENous ONCE PRN Rhea KELLEN Hernandez, DO        clopidogrel (PLAVIX) tablet 75 mg  75 mg Oral Daily Rheasa KELLEN Hernandez DO   75 mg at 01/10/23 7485        Allergies: Allergies   Allergen Reactions    Augmentin [Amoxicillin-Pot Clavulanate]         Physical Examination  Vitals   Vitals:    01/10/23 0600 01/10/23 0800 01/10/23 0900 01/10/23 1000   BP: 136/84 (!) 142/87  (!) 134/99   Pulse: 60 66 69 66   Resp: 22 17 19 20   Temp:  98.3 °F (36.8 °C)     TempSrc:  Oral     SpO2: 95% 98% 99% 98%   Weight:       Height: This was an elderly gentleman in no acute distress, who was alert, cooperative and oriented. He was very pleasant. His skin was unremarkable, with no lymphadenopathy. Head examination was unrevealing. His neck was supple, without thyromegaly; there were +1 carotid upstrokes without bruits. There was full range of motion of his neck. His spine displayed no abnormalities. His lungs are clear to auscultation. Cardiac testing for an irregularly irregular rhythm, without murmurs or rubs. His abdomen was also unrevealing. His limbs displayed no abnormalities. Neurologic Examination    Mental Status  Alert, and oriented to person, place and time. Speech was fluent with intact comprehension. He displayed only a few little paraphasias. There was no evidence of memory impairment. Attention and concentration were normal.     Cranial Nerves  II. Visual fields full to confrontation bilaterally. III, IV, VI: Pupils equally round and reactive to light, 3 to 2 mm bilaterally. EOMs: full, no nystagmus. V. Facial sensation intact to light touch bilaterally  VII: Facial movements symmetric and strong  VIII: Hearing intact to voice  IX,X: Palate elevated symmetrically.  No dysarthria  XI: Sternocleidomastoid and trapezius 5/5 bilaterally   XII: Tongue was midline    Motor     Right Left   Right Left   Deltoid 5 5  Hip Flexion 5 5   Biceps      5  5  Knee Extension 5 5 Triceps 5 5  Knee Flexion 5 5   Handgrip 5 5  Ankle Dorsiflexion 5 5       Ankle Plantarflexion 5 5     There were normal tone and bulk. There was no pronator drift. All sensory modalities were intact. Finger-nose heel shin testings were performed well. Rapid alternating movements and fine movements were intact. He walked well. Labs  Recent Labs     01/10/23  0006 01/10/23  0407    137   K 4.3 4.1    104   CO2 22 22   BUN 18 18   CREATININE 1.2 1.2   GLUCOSE 95 114*   CALCIUM 9.2 8.7   PROT 6.7 6.2*   LABALBU 4.1 3.7   BILITOT 0.2 <0.2   ALKPHOS 63 57   AST 32 29   ALT 16 14   WBC 7.7 7.1   RBC 4.37 4.17   HGB 12.8 12.1*   HCT 35.2* 34.8*   MCV 80.5 83.5   MCH 29.3 29.0   MCHC 36.4* 34.8*   RDW 13.5 13.6    212   MPV 11.0 10.8   HDL 29  --    LDLCALC 65  --    LABA1C 5.8*  --        Imaging  MRI BRAIN WO CONTRAST   Final Result   No evidence of acute intracranial abnormality. No acute infarct seen. XR CHEST PORTABLE   Final Result   No acute process. CT Head W/O Contrast   Final Result   Diffuse atrophy likely age related   Findings compatible with small vessel ischemic changes. Findings were called to time of dictation to Dr. Daisha Ho   Final Result   1. Estimated stenosis of the proximal right and left internal carotid   artery by NASCET criteria is not hemodynamically significant   2. Mild atherosclerotic disease . 3. Left M3 occlusion. CTA NECK W CONTRAST   Final Result   1. Estimated stenosis of the proximal right and left internal carotid   artery by NASCET criteria is not hemodynamically significant   2. Mild atherosclerotic disease . 3. Left M3 occlusion.                         CT BRAIN PERFUSION   Final Result      Findings compatible with ischemic penumbra in the left parietal lobe               NM Cardiac Stress Test Nuclear Imaging    (Results Pending)                                   MRI findings show evidence of ischemic changes in the left insular cortex and temporal lobe. All labs and imaging studies were reviewed independently today    This individual suffered a likely embolic infarct to the M3 branch of the left middle cerebral artery. He presented with aphasia and weakness, which have almost completely resolved. He requires anticoagulation and dual antiplatelet therapies. If he is unable to use anticoagulants, a Watchman device is in order, hopefully, the soonest possible. He is otherwise stable neurologically. He remains stable medically despite his multiple comorbidities. May be transferred from the intensive care. Neurology will be contacting concerning the ability to use anticoagulation at this time. If not, hopefully, he can be transferred to [facility] as soon as possible. I interviewed and examined the patient with my residents and students, as well as reviewing all imaging and laboratory testing. Electronically signed by Pola Hawthorne on 1/10/2023 at 1:10 PM       I spent 80 minutes with the patient with over 50 % spent in counseling and disease management. All patient issues were addressed and all questions were answered.

## 2023-01-11 LAB
ALBUMIN SERPL-MCNC: 4 G/DL (ref 3.5–5.2)
ALP BLD-CCNC: 62 U/L (ref 40–129)
ALT SERPL-CCNC: 16 U/L (ref 0–40)
ANION GAP SERPL CALCULATED.3IONS-SCNC: 11 MMOL/L (ref 7–16)
AST SERPL-CCNC: 31 U/L (ref 0–39)
BASOPHILS ABSOLUTE: 0.04 E9/L (ref 0–0.2)
BASOPHILS RELATIVE PERCENT: 0.5 % (ref 0–2)
BILIRUB SERPL-MCNC: 0.4 MG/DL (ref 0–1.2)
BILIRUBIN DIRECT: <0.2 MG/DL (ref 0–0.3)
BILIRUBIN, INDIRECT: NORMAL MG/DL (ref 0–1)
BUN BLDV-MCNC: 17 MG/DL (ref 6–23)
CALCIUM IONIZED: 1.28 MMOL/L (ref 1.15–1.33)
CALCIUM SERPL-MCNC: 9.4 MG/DL (ref 8.6–10.2)
CHLORIDE BLD-SCNC: 103 MMOL/L (ref 98–107)
CO2: 25 MMOL/L (ref 22–29)
CREAT SERPL-MCNC: 1.1 MG/DL (ref 0.7–1.2)
EOSINOPHILS ABSOLUTE: 0.21 E9/L (ref 0.05–0.5)
EOSINOPHILS RELATIVE PERCENT: 2.5 % (ref 0–6)
GFR SERPL CREATININE-BSD FRML MDRD: >60 ML/MIN/1.73
GLUCOSE BLD-MCNC: 110 MG/DL (ref 74–99)
HCT VFR BLD CALC: 38.2 % (ref 37–54)
HEMOGLOBIN: 13 G/DL (ref 12.5–16.5)
IMMATURE GRANULOCYTES #: 0.03 E9/L
IMMATURE GRANULOCYTES %: 0.4 % (ref 0–5)
LYMPHOCYTES ABSOLUTE: 2.83 E9/L (ref 1.5–4)
LYMPHOCYTES RELATIVE PERCENT: 34.3 % (ref 20–42)
MAGNESIUM: 1.9 MG/DL (ref 1.6–2.6)
MCH RBC QN AUTO: 28.3 PG (ref 26–35)
MCHC RBC AUTO-ENTMCNC: 34 % (ref 32–34.5)
MCV RBC AUTO: 83.2 FL (ref 80–99.9)
MONOCYTES ABSOLUTE: 0.42 E9/L (ref 0.1–0.95)
MONOCYTES RELATIVE PERCENT: 5.1 % (ref 2–12)
NEUTROPHILS ABSOLUTE: 4.73 E9/L (ref 1.8–7.3)
NEUTROPHILS RELATIVE PERCENT: 57.2 % (ref 43–80)
PDW BLD-RTO: 13.5 FL (ref 11.5–15)
PHOSPHORUS: 3.5 MG/DL (ref 2.5–4.5)
PLATELET # BLD: 196 E9/L (ref 130–450)
PMV BLD AUTO: 10.4 FL (ref 7–12)
POTASSIUM SERPL-SCNC: 4.1 MMOL/L (ref 3.5–5)
RBC # BLD: 4.59 E12/L (ref 3.8–5.8)
SODIUM BLD-SCNC: 139 MMOL/L (ref 132–146)
TOTAL PROTEIN: 6.6 G/DL (ref 6.4–8.3)
WBC # BLD: 8.3 E9/L (ref 4.5–11.5)

## 2023-01-11 PROCEDURE — 94640 AIRWAY INHALATION TREATMENT: CPT

## 2023-01-11 PROCEDURE — 2500000003 HC RX 250 WO HCPCS: Performed by: NURSE PRACTITIONER

## 2023-01-11 PROCEDURE — 2580000003 HC RX 258: Performed by: INTERNAL MEDICINE

## 2023-01-11 PROCEDURE — 6370000000 HC RX 637 (ALT 250 FOR IP): Performed by: INTERNAL MEDICINE

## 2023-01-11 PROCEDURE — 6370000000 HC RX 637 (ALT 250 FOR IP): Performed by: FAMILY MEDICINE

## 2023-01-11 PROCEDURE — 85025 COMPLETE CBC W/AUTO DIFF WBC: CPT

## 2023-01-11 PROCEDURE — 2060000000 HC ICU INTERMEDIATE R&B

## 2023-01-11 PROCEDURE — 80048 BASIC METABOLIC PNL TOTAL CA: CPT

## 2023-01-11 PROCEDURE — 83735 ASSAY OF MAGNESIUM: CPT

## 2023-01-11 PROCEDURE — 6370000000 HC RX 637 (ALT 250 FOR IP)

## 2023-01-11 PROCEDURE — 92507 TX SP LANG VOICE COMM INDIV: CPT

## 2023-01-11 PROCEDURE — 6370000000 HC RX 637 (ALT 250 FOR IP): Performed by: NURSE PRACTITIONER

## 2023-01-11 PROCEDURE — 99291 CRITICAL CARE FIRST HOUR: CPT | Performed by: INTERNAL MEDICINE

## 2023-01-11 PROCEDURE — 82330 ASSAY OF CALCIUM: CPT

## 2023-01-11 PROCEDURE — 97530 THERAPEUTIC ACTIVITIES: CPT

## 2023-01-11 PROCEDURE — 84100 ASSAY OF PHOSPHORUS: CPT

## 2023-01-11 PROCEDURE — 36415 COLL VENOUS BLD VENIPUNCTURE: CPT

## 2023-01-11 PROCEDURE — 97535 SELF CARE MNGMENT TRAINING: CPT

## 2023-01-11 PROCEDURE — 80076 HEPATIC FUNCTION PANEL: CPT

## 2023-01-11 PROCEDURE — 97161 PT EVAL LOW COMPLEX 20 MIN: CPT

## 2023-01-11 PROCEDURE — 92523 SPEECH SOUND LANG COMPREHEN: CPT

## 2023-01-11 RX ORDER — LANOLIN ALCOHOL/MO/W.PET/CERES
3 CREAM (GRAM) TOPICAL ONCE
Status: COMPLETED | OUTPATIENT
Start: 2023-01-11 | End: 2023-01-11

## 2023-01-11 RX ORDER — IPRATROPIUM BROMIDE AND ALBUTEROL SULFATE 2.5; .5 MG/3ML; MG/3ML
1 SOLUTION RESPIRATORY (INHALATION) EVERY 4 HOURS PRN
Status: DISCONTINUED | OUTPATIENT
Start: 2023-01-11 | End: 2023-01-12 | Stop reason: HOSPADM

## 2023-01-11 RX ORDER — DILTIAZEM HYDROCHLORIDE 5 MG/ML
10 INJECTION INTRAVENOUS ONCE
Status: COMPLETED | OUTPATIENT
Start: 2023-01-11 | End: 2023-01-11

## 2023-01-11 RX ORDER — LANOLIN ALCOHOL/MO/W.PET/CERES
400 CREAM (GRAM) TOPICAL ONCE
Status: COMPLETED | OUTPATIENT
Start: 2023-01-11 | End: 2023-01-11

## 2023-01-11 RX ORDER — METOPROLOL SUCCINATE 100 MG/1
100 TABLET, EXTENDED RELEASE ORAL 2 TIMES DAILY
Status: DISCONTINUED | OUTPATIENT
Start: 2023-01-11 | End: 2023-01-12

## 2023-01-11 RX ADMIN — APIXABAN 5 MG: 5 TABLET, FILM COATED ORAL at 09:35

## 2023-01-11 RX ADMIN — METOPROLOL SUCCINATE 50 MG: 50 TABLET, EXTENDED RELEASE ORAL at 07:51

## 2023-01-11 RX ADMIN — MELATONIN 3 MG ORAL TABLET 3 MG: 3 TABLET ORAL at 00:21

## 2023-01-11 RX ADMIN — CLOPIDOGREL BISULFATE 75 MG: 75 TABLET ORAL at 07:51

## 2023-01-11 RX ADMIN — DILTIAZEM HYDROCHLORIDE 10 MG: 5 INJECTION, SOLUTION INTRAVENOUS at 08:28

## 2023-01-11 RX ADMIN — POLYETHYLENE GLYCOL 3350 17 G: 17 POWDER, FOR SOLUTION ORAL at 21:39

## 2023-01-11 RX ADMIN — GABAPENTIN 600 MG: 300 CAPSULE ORAL at 07:51

## 2023-01-11 RX ADMIN — DEXTROSE MONOHYDRATE 5 MG/HR: 50 INJECTION, SOLUTION INTRAVENOUS at 08:33

## 2023-01-11 RX ADMIN — PANTOPRAZOLE SODIUM 40 MG: 40 TABLET, DELAYED RELEASE ORAL at 06:12

## 2023-01-11 RX ADMIN — METOPROLOL SUCCINATE 100 MG: 100 TABLET, EXTENDED RELEASE ORAL at 21:39

## 2023-01-11 RX ADMIN — SODIUM CHLORIDE, PRESERVATIVE FREE 10 ML: 5 INJECTION INTRAVENOUS at 07:52

## 2023-01-11 RX ADMIN — MAGNESIUM OXIDE 400 MG (241.3 MG MAGNESIUM) TABLET 400 MG: TABLET at 07:51

## 2023-01-11 RX ADMIN — ATORVASTATIN CALCIUM 80 MG: 80 TABLET, FILM COATED ORAL at 21:39

## 2023-01-11 RX ADMIN — IPRATROPIUM BROMIDE AND ALBUTEROL SULFATE 1 AMPULE: 2.5; .5 SOLUTION RESPIRATORY (INHALATION) at 12:41

## 2023-01-11 RX ADMIN — APIXABAN 5 MG: 5 TABLET, FILM COATED ORAL at 21:39

## 2023-01-11 RX ADMIN — DEXTROSE MONOHYDRATE 10 MG/HR: 50 INJECTION, SOLUTION INTRAVENOUS at 18:16

## 2023-01-11 RX ADMIN — ASPIRIN 81 MG: 81 TABLET, COATED ORAL at 07:51

## 2023-01-11 ASSESSMENT — PAIN SCALES - GENERAL
PAINLEVEL_OUTOF10: 0

## 2023-01-11 NOTE — PROGRESS NOTES
Hospitalist Progress Note      Synopsis: Patient admitted on 39/2023 58-year-old male past medical history of DVT on lower extremity, solitary kidney with history of kidney cancer, prostate cancer, testicular cancer, atrial fibrillation not on anticoagulation due to bladder cancer bleeding, former smoker presented today due to right-sided weakness. Patient has been off his aspirin for 7 days due to recent bleeding in his bladder. Presenting due to aphasia and right-sided weakness. Woke up 6 AM this morning had some difficulty using his right hand when he tried to open his pill bottles. Wife does notice difficulty with his speech and also numbness and weakness in his right side presents to the ER NIH of 4. From presentation was elevated at 176/39. CT brain perfusion showed concern for findings compatible with ischemic penumbra in the left parietal lobe. CT angio head and neck with left M3 occlusion. ER did speak to neuro interventionalists recommended loading with Plavix and aspirin. When I did see the patient his symptoms had resolved. He mentioned that his right side feels back to normal and speech has returned to normal.  He did mention he has a history of atrial fibrillation but was not on any blood thinner due to his history of bladder cancer. Taken off aspirin a week ago due to a bladder procedure. Subjective    Feeling better had some speech difficulties today after he got anxious  No CP or SOB  No fever or chills   No uncontrolled pain  No vomiting or diarrhea   No events reported overnight     Exam:  /66   Pulse (!) 103   Temp 97.1 °F (36.2 °C) (Temporal)   Resp 18   Ht 5' 11\" (1.803 m)   Wt 260 lb (117.9 kg)   SpO2 98%   BMI 36.26 kg/m²   General appearance: No apparent distress, appears stated age and cooperative. HEENT: Pupils equal, round, and reactive to light. Conjunctivae/corneas clear. Neck: Supple. No jugular venous distention. Trachea midline.   Respiratory: Normal respiratory effort. Clear to auscultation, bilaterally without Rales/Wheezes/Rhonchi. Cardiovascular: Regular rate and rhythm with normal S1/S2 without murmurs, rubs or gallops. Abdomen: Soft, non-tender, non-distended with normal bowel sounds. Musculoskeletal: No clubbing, cyanosis or edema bilaterally. Brisk capillary refill. 2+radial pulses. Skin:  No rashes    Neurologic: awake, alert and following commands, occasional mildly slurred speech    Medications:  Reviewed    Infusion Medications    dilTIAZem 10 mg/hr (01/11/23 1103)    sodium chloride       Scheduled Medications    apixaban  5 mg Oral BID    metoprolol succinate  50 mg Oral BID    pantoprazole  40 mg Oral QAM AC    gabapentin  600 mg Oral Daily    atorvastatin  80 mg Oral Nightly    sodium chloride flush  5-40 mL IntraVENous 2 times per day    ipratropium-albuterol  1 ampule Inhalation Q4H WA     PRN Meds: traMADol, [Held by provider] labetalol, sodium chloride flush, sodium chloride, ondansetron **OR** ondansetron, polyethylene glycol, acetaminophen **OR** acetaminophen, perflutren lipid microspheres    I/O    Intake/Output Summary (Last 24 hours) at 1/11/2023 1128  Last data filed at 1/11/2023 0900  Gross per 24 hour   Intake 600 ml   Output 1150 ml   Net -550 ml         Labs:   Recent Labs     01/10/23  0006 01/10/23  0407 01/11/23  0438   WBC 7.7 7.1 8.3   HGB 12.8 12.1* 13.0   HCT 35.2* 34.8* 38.2    212 196         Recent Labs     01/10/23  0006 01/10/23  0407 01/11/23  0438    137 139   K 4.3 4.1 4.1    104 103   CO2 22 22 25   BUN 18 18 17   CREATININE 1.2 1.2 1.1   CALCIUM 9.2 8.7 9.4   PHOS 2.9 3.1 3.5         Recent Labs     01/10/23  0006 01/10/23  0407 01/11/23  0438   PROT 6.7 6.2* 6.6   ALKPHOS 63 57 62   ALT 16 14 16   AST 32 29 31   BILITOT 0.2 <0.2 0.4         Recent Labs     01/09/23  1020   INR 1.0         No results for input(s): CKTOTAL, TROPONINI in the last 72 hours.     Chronic labs:  Lab Results Component Value Date    CHOL 143 01/10/2023    TRIG 246 (H) 01/10/2023    HDL 29 01/10/2023    LDLCALC 65 01/10/2023    TSH 5.190 (H) 01/10/2023    INR 1.0 01/09/2023    LABA1C 5.8 (H) 01/10/2023       Radiology:  Imaging studies reviewed today. Normal left ventricular size and systolic function. Ejection fraction is visually estimated at 60-65%. Grade II diastolic dysfunction. No regional wall motion abnormalities seen. Normal left ventricular wall thickness. Moderately dilated right ventricle with normal function. Biatrial dilation. Mild mitral regurgitation. Agitated saline injected for shunt evaluation. No evidence of atrial level shunt. No intracardiac mass or thrombus. No cardiac source of embolism identified. ASSESSMENT:    Principal Problem:    Cerebrovascular accident (CVA) due to occlusion of left middle cerebral artery (HCC)  Active Problems:    Paroxysmal atrial fibrillation ([address]  Resolved Problems:    * No resolved hospital problems. *     Acute CVA with left parietal lobe CVA  Left M3 occlusion  Hypertensive emergency  History of DVT  History of bladder cancer with solitary kidney  Atrial fibrillation not on anticoagulation due to bladder CA        PLAN:  Acute ischemic stroke admitted to ICU-transfer to intermediate care  Apixaban  High intensity statin  monitor blood pressures   Monitor neuro status  Critical care consult appreciated  Neurology Consult appreciated . -Discussed case  MRI brain + left parietal ischemic stroke  echocardiogram with bubble study  CT brain perfusion with concern for left parietal lobe CVA. check A1c and lipid panel. PT OT and speech therapy consultation. A. fib with RVR today  Treated diltiazem drip  Metoprolol, apixaban  EP Consult appreciated  for history of A. fib --nuclear stress-low risk  -Has appointment at [facility] for eval for possible watchman versus ablation        Diet: ADULT DIET;  Regular  Code Status: Full Code  PT/OT Eval Status:     DVT Prophylaxis:     Recommended disposition at discharge: Anticipate home 24 to 48 hours    +++++++++++++++++++++++++++++++++++++++++++++++++  Rashid Sanabria MD   [facility].  +++++++++++++++++++++++++++++++++++++++++++++++++  NOTE: This report was transcribed using voice recognition software. Every effort was made to ensure accuracy; however, inadvertent computerized transcription errors may be present.

## 2023-01-11 NOTE — PROGRESS NOTES
[address] [id number] [address]       [facility]:                                                               Patient Name: Jose G   MRN: [id number]  : 1960  Room: [address] [place] [address]    Evaluating OT: CALVIN Mccain,  OTR/L; [id number]    Referring Provider: Livier Seo MD   Specific Provider Orders/Date: OT eval and treat (23)       Diagnosis: Cerebrovascular accident (CVA) due to occlusion of left middle cerebral artery ([facility] [address] [I63.512]  Stroke due to occlusion of left middle cerebral artery ([facility] [address] [O28.273]     Reason for admission: Pt admitted with L MCA occlusion. Surgery/Procedures: None this admission     Pertinent Medical History:    Past Medical History:   Diagnosis Date    Anemia     Cancer of kidney ([address]     left    DVT of leg (deep venous thrombosis) (HCC)     right    Prostate cancer (HCC)     Solitary kidney     Testicular cancer (HCC)         *Precautions:  Fall Risk, mild aphasia, monitor HR    Assessment of current deficits   [x] Functional mobility  [x]ADLs  [x] Strength               []Cognition   [x] Functional transfers   [x] IADLs         [x] Safety Awareness   [x]Endurance   [] Fine Coordination        [] ROM     [] Vision/perception   []Sensation    []Gross Motor Coordination [x] Balance   [] Delirium                  []Motor Control     [] Communication    OT PLAN OF CARE   OT POC based on physician orders, patient diagnosis and results of clinical assessment.        Frequency/Duration: 1-3 days/wk for 1-2 weeks PRN    Specific OT Treatment Interventions to include:   * Instruction/training on adapted ADL techniques and AE recommendations to increase functional independence within precautions       * Training on energy conservation strategies, correct breathing pattern and techniques to improve independence/tolerance for self-care routine  * Functional transfer/mobility training/DME recommendations for increased independence, safety, and fall prevention  * Patient/Family education to increase follow through with safety techniques and functional independence  * Recommendation of environmental modifications for increased safety with functional transfers/mobility and ADLs  * Therapeutic exercise to improve motor endurance, ROM, and functional strength for ADLs/functional transfers  * Therapeutic activities to facilitate/challenge dynamic balance, stand tolerance for increased safety and independence with ADLs  * Therapeutic activities to facilitate gross/fine motor skills for increased independence with ADLs  * Delirium prevention/treatment    Modified [place] Scale   Score     Description  0             No symptoms  1             No significant disability despite symptoms  2             Slight disability; able to look after own affairs  3             Moderate disability; able to ambulate without assist/ requires assist with ADLs  4             Moderate/Severe disability;requires assist to ambulate/assist with ADLs  5             Severe disability;bedridden/incontinent   6               Score:   1    Recommended Adaptive Equipment: TBD     Home Living: Pt lives with wife in a 1 story home with 2 step(s) to enter and 1 rail(s); bed/bath on main level. Bathroom setup: walk-in shower  Equipment owned: None    Prior Level of Function: Ind with ADLs; Ind with IADLs. No AD for functional mobility. Driving: Yes  Occupation:     Pain Level: pt c/o no pain this session    Cognition: A&O: 4/4; Follows multi step commands   Memory: G; good recall of events leading up to hospitalization. Comprehension: G   Problem solving: G   Judgement/safety: G               Communication skills: WFL; pt reporting intermittent difficulty with word finding. Vision: Good tracking/scanning noted               Glasses: Yes Hearing: WFL     RASS: 0  CAM-ICU: (NT) Delirium    UE Assessment:   Hand Dominance: Right [x]  Left []     ROM Strength   RUE  WFL 5/5  : Good  FMC: WFL  GMC: WFL   LUE WFL 5/5  : Good  FMC: WFL  GMC: WFL     Sensation: C/o slight R sided numbness/tingling in extremities. Tone: WNL  Edema: Unremarkable     Functional Assessment:  AM-PAC Daily Activity Raw Score: 19/24   Initial Eval Status  Date: 1/10/23 Treatment Status  Date: 1/11/23 STGs = LTGs  Time frame: 7-14 days   Feeding Ind Ind                            Grooming SBA SBA  Standing at sink                     Ind        UB dressing/bathing SBA SBA                     Ind       LB dressing/bathing SBA SBA                     Ind        Toileting SBA SBA                     Ind     Bed Mobility  Supine to sit:   NT    Sit to supine:   NT    Pt seated in chair upon exit   Supine to sit:   NT    Sit to supine:   NT    Pt seated in chair upon exit                     Ind     Functional Transfers Sit to stand:   SBA    Stand to sit:   SBA    Stand Pivot:   NT Sit to stand:   SBA    Stand to sit:   SBA    Stand Pivot:   NT                     Ind     Functional Mobility SBA with no AD  For short distance around room. SBA with no AD  For chair<>sink                     Ind        Balance Sitting:     Static:  S    Dynamic:SBA  Standing: SBA Sitting:     Static: Ind    Dynamic:SBA  Standing: SBA Sitting:     Static:  Ind    Dynamic:Ind  Standing: Ind                   Endurance/Activity Tolerance   Good- tolerance with light activity.   Good- tolerance with light activity  Limited by HR this session                     WFL  For full ADL/IADL tasks   Visual/  Perceptual   WFL                     Vitals:   HR at rest: 104 bpm HR at end of session: 108 bpm   SpO2 at rest: --% SpO2 at end of session 96%   BP at rest: 108/70 mmHg BP at end of session 110/73 mmHg   *Monitor HR    Treatment: OT treatment provided this date includes:    Instruction/training on safe bed mobility/functional mobility/transfer techniques: with focus on safety, body mechanics, and precautions  Skilled Monitoring of Vitals: to include BP, spO2, and HR throughout session to maximize safety. Sitting/Standing Balance/Tolerance: to increase balance and activity tolerance during ADLs and facilitate proper posture and positioning. Therapeutic activity: to challenge dynamic sitting/standing balance and endurance to promote safety during ADL tasks and functional transfers and mobility. Delirium Prevention: Environmental and sensory modifications assessed and implemented to decrease ICU acquired delirium and to improve overall orientation, mentation and pt interaction with family/staff. Line management and environmental modifications made prior to and end of session to ensure patient safety and to increase efficiency of session. Skilled monitoring of HR, O2 saturation, blood pressure and patient's response to activity performed throughout session. Comments: Pt case discussed in rounds, OK from RN to see patient. Upon arrival, patient seated in chair. Pt pleasant and agreeable to participate in therapy session. Pt demo good- tolerance with good understanding of education/techniques. At end of session, patient properly positioned in bedside chair with call light within reach, all lines and tubes intact. Pt instructed on use of call light for assistance and fall prevention. Nursing notified of patient positioning.       Time In: 1010             Time Out: 1033         Total Treatment time: 23 min   Min Units   OT Eval Low 70418     OT Eval Medium 42884     OT Eval High [id number]     OT Re-Eval [id number]     Therapeutic Ex [id number]     Therapeutic Activities 30306 36 1   ADL/Self Care 75343 10 1   Orthotic Management 73735     Neuro Re-Ed 32971     Non-Billable Time          CALVIN Tong,  OTR/L; [id number]

## 2023-01-11 NOTE — PROGRESS NOTES
[facility] and [address] [facility] Electrophysiology  Inpatient progress note   PATIENT: Vikas Richardson RECORD NUMBER: [id number]  DATE OF SERVICE:  1/11/2023  ATTENDING ELECTROPHYSIOLOGIST: Laraine Bloch, MD  PRIMARY ELECTROPHYSIOLOGIST: Laraine Bloch, MD  REFERRING PHYSICIAN: No ref. provider found and Enola Holter, MD  CHIEF COMPLAINT: Right hand clumsiness and slurred speech    HPI: This is a 65 y.o. male with a history of   Patient Active Problem List   Diagnosis    Cerebrovascular accident (CVA) due to occlusion of left middle cerebral artery ([address]    Paroxysmal atrial fibrillation ([facility] [address]   who presented to the hospital on 1/9/23 complaining of right had clumsiness and slurred speech started at 6.30 AM of 1/9/23 when he woke up. He has history of paroxysmal atrial fibrillation, DVT left leg, PE, renal cell cancer status post left nephrectomy at [facility] on 12/2019, prostate cancer status post chemotherapy, testicular cancer status post removal, bladder cancer, recurrent hematuria and peripheral neuropathy. The patient states that he was diagnosed with paroxysmal atrial fibrillation 3 years ago and was prescribed Metoprolol Succinate which the dose has been increased gradually to currently at 200 mg daily. He reports worsening of palpitations lately and can lasted up to 3 to 4 hours at time. He was prescribed Lovenox, Xarelto and Eliquis in the past for Webcom but all cause recurrent hematuria so he stopped using Webcom for the past 1.5 years. The patient states that he woke up at 4.30 yesterday and felt fine and he went back to bed. When he woke up at 6.30 AM, he noticed right hand clumsiness and slurred speech. He presented to ED and was diagnosed with acute ischemic L M3 CVA which was out of tPA window. He was admitted to ICU for observation. The patient is currently in NSR. He was seen by Neurology.  Cardiac electrophysiology service is consulted for PAF and CVA.    1/11/23: patient up in chair. He went into afib this am with RVR, stable in chair with stable BP. Feels palpitations but no acute distress.        Patient Active Problem List    Diagnosis Date Noted    Paroxysmal atrial fibrillation ([facility] [address] 01/10/2023     Priority: Medium    Cerebrovascular accident (CVA) due to occlusion of left middle cerebral artery ([facility] [address] 01/09/2023     Priority: Medium       Past Medical History:   Diagnosis Date    Anemia     Cancer of kidney ([facility] [address]     left    DVT of leg (deep venous thrombosis) ([facility] [address]     right    Prostate cancer ([facility] [address]     Solitary kidney     Testicular cancer ([facility] [address]        Family History   Problem Relation Age of Onset    Hypertension Father        Social History     Tobacco Use    Smoking status: Former     Packs/day: 0.50     Years: 25.00     Pack years: 12.50     Types: Cigarettes     Quit date: 12/2/2019     Years since quitting: 3.1    Smokeless tobacco: Never   Substance Use Topics    Alcohol use: Not Currently       Current Facility-Administered Medications   Medication Dose Route Frequency Provider Last Rate Last Admin    apixaban (ELIQUIS) tablet 5 mg  5 mg Oral BID KARINA Stanley CNP        dilTIAZem 100 mg in dextrose 5 % 100 mL infusion (ADD-Grace)  2.5-15 mg/hr IntraVENous Continuous KARINA Stanley CNP 5 mL/hr at 01/11/23 0833 5 mg/hr at 01/11/23 0833    metoprolol succinate (TOPROL XL) extended release tablet 50 mg  50 mg Oral BID KARINA Stanley CNP   50 mg at 01/11/23 0751    pantoprazole (PROTONIX) tablet 40 mg  40 mg Oral QAM AC Cecilia Jensen MD   40 mg at 01/11/23 0612    traMADol (ULTRAM) tablet 50 mg  50 mg Oral Q6H PRN Cecilia Jensen MD        gabapentin (NEURONTIN) capsule 600 mg  600 mg Oral Daily Cecilia Jensen MD   600 mg at 01/11/23 0751    aspirin EC tablet 81 mg  81 mg Oral Daily Cecilia Jensen MD   81 mg at 01/11/23 1446    Or    aspirin suppository 300 mg  300 mg Rectal Daily Cecilia Jensen MD atorvastatin (LIPITOR) tablet 80 mg  80 mg Oral Nightly Marii Del Rosario MD   80 mg at 01/10/23 1951    [Held by provider] labetalol (NORMODYNE;TRANDATE) injection 10 mg  10 mg IntraVENous Q10 Min PRN Marii Del Rosario MD        sodium chloride flush 0.9 % injection 5-40 mL  5-40 mL IntraVENous 2 times per day Alvy Brooms, DO   10 mL at 01/11/23 0752    sodium chloride flush 0.9 % injection 5-40 mL  5-40 mL IntraVENous PRN Rhea L Hernandez, DO        0.9 % sodium chloride infusion   IntraVENous PRN Rhea L Hernandez, DO        ondansetron (ZOFRAN-ODT) disintegrating tablet 4 mg  4 mg Oral Q8H PRN Rhea L Hernandez, DO        Or    ondansetron (ZOFRAN) injection 4 mg  4 mg IntraVENous Q6H PRN Rhea L Hernandez, DO        polyethylene glycol (GLYCOLAX) packet 17 g  17 g Oral Daily PRN Rhea L Hernandez, DO        acetaminophen (TYLENOL) tablet 650 mg  650 mg Oral Q6H PRN Alvy Brooms, DO   650 mg at 01/10/23 6669    Or    acetaminophen (TYLENOL) suppository 650 mg  650 mg Rectal Q6H PRN Rhea L Hernandez, DO        ipratropium-albuterol (DUONEB) nebulizer solution 1 ampule  1 ampule Inhalation Q4H WA Rhea L Hernandez, DO   1 ampule at 01/10/23 2031    perflutren lipid microspheres (DEFINITY) injection 1.5 mL  1.5 mL IntraVENous ONCE PRN Rhea L Hernandez, DO        clopidogrel (PLAVIX) tablet 75 mg  75 mg Oral Daily Rhea L Hernandez, DO   75 mg at 01/11/23 0751        Allergies   Allergen Reactions    Augmentin [Amoxicillin-Pot Clavulanate]      ROS:   Constitutional: Negative for fever, activity change and appetite change. HENT: Negative for epistaxis. Eyes: Negative for diploplia, blurred vision. Respiratory: Negative for cough, chest tightness, shortness of breath and wheezing. Cardiovascular: pertinent positives in HPI  Gastrointestinal: Negative for abdominal pain and blood in stool.    All other review of systems are negative     PHYSICAL EXAM:   Vitals:    01/11/23 0300 01/11/23 040 01/11/23 0500 01/11/23 0600   BP: (!) 141/90 139/77 131/71 130/64   Pulse: 59 61 57 (!) 106   Resp: 20 22 20 22   Temp:  97.1 °F (36.2 °C)     TempSrc:  Temporal     SpO2: 98% 97% 95% 97%   Weight:       Height:          Constitutional: Well-developed, no acute distress  Eyes: conjunctivae normal, no xanthelasma   Ears, Nose, Throat: oral mucosa moist, no cyanosis   CV: no JVD. Tachycardic and irregular rate and rhythm. Normal S1S2 and no S3. No murmurs, rubs, or gallops. PMI is nondisplaced  Lungs: clear to auscultation bilaterally, normal respiratory effort without used of accessory muscles  Abdomen: soft, non-tender, bowel sounds present, no masses or hepatomegaly   Musculoskeletal: no digital clubbing, no edema   Skin: warm, no rashes     I have personally reviewed the laboratory, cardiac diagnostic and radiographic testing as outlined below:    Data:    Recent Labs     01/10/23  0006 01/10/23  0407 01/11/23  0438   WBC 7.7 7.1 8.3   HGB 12.8 12.1* 13.0   HCT 35.2* 34.8* 38.2    212 196     Recent Labs     01/10/23  0006 01/10/23  0407 01/11/23  0438    137 139   K 4.3 4.1 4.1    104 103   CO2 22 22 25   BUN 18 18 17   CREATININE 1.2 1.2 1.1   CALCIUM 9.2 8.7 9.4      Lab Results   Component Value Date/Time    MG 1.9 01/11/2023 04:38 AM     Recent Labs     01/10/23  0006   TSH 5.190*     Recent Labs     01/09/23  1020   INR 1.0       CXR 1/10/23:   FINDINGS:   The lungs are without acute focal process. There is no effusion or   pneumothorax. The cardiomediastinal silhouette is without acute process. The   osseous structures are without acute process. Impression   No acute process. Telemetry 01/11/23 : Afib with RVR up to 180's this am, SR prior with rates 60-70's     EKG 1/10/23: NSR 66 bpm, RBBB, LAFB, , Qtc 463 ms. Please see scan in Cardiology.     Echocardiogram 10/9/20:   Findings      Left Ventricle   Left ventricle size is normal.   Mild concentric left ventricular hypertrophy. Normal left ventricular systolic function. Ejection fraction is visually estimated at 55-60%. Indeterminate diastolic function. Right Ventricle   Normal right ventricular size and function (TAPSE 1.9 cm). Left Atrium   Normal sized left atrium by volume index. Right Atrium   Normal sized right atrium. Mitral Valve   Mild mitral regurgitation. No evidence of hemodynamically significant mitral stenosis. Tricuspid Valve   Mild tricuspid regurgitation. PASP is estimated at 20 mmHg (normal estimated PASP). Aortic Valve   No evidence of hemodynamically significant aortic regurgitation or   stenosis. Pulmonic Valve   The pulmonic valve was not well visualized. Pericardial Effusion   No evidence of a hemodynamically significant pericardial effusion. Aorta   Aortic root dimension within normal limits. Conclusions      Summary   Cardiac rhythm: atrial fibrillation with episodes of RVR   Normal left ventricular systolic function. Ejection fraction is visually estimated at 55-60%. Mild concentric left ventricular hypertrophy. Normal right ventricular size and function (TAPSE 1.9 cm). Indeterminate diastolic function. Mild mitral regurgitation. Mild tricuspid regurgitation. PASP is estimated at 20 mmHg (normal estimated PASP). Signature      ----------------------------------------------------------------   Electronically signed by Addison Adams MD(Interpreting   physician) on 10/13/2020 06:46 AM   ----------------------------------------------------------------      1/10/23 NM stress   Impression   1. ECG during the infusion did not change. 2.  The myocardial perfusion imaging was normal without ischemia or   infarct. 3.  Overall left ventricular systolic function was normal without   regional wall motion abnormalities. 4.  No transient ischemic dilatation. 5.   Low risk general pharmacologic stress test.       Thank you for sending your patient to this [facility]. I have independently reviewed all of the ECGs and rhythm strips per above     Assessment/Plan: This is a 65 y.o. male with a history of   Patient Active Problem List   Diagnosis    Cerebrovascular accident (CVA) due to occlusion of left middle cerebral artery (HCC)    Paroxysmal atrial fibrillation ([facility] [address]    who presents with CVA and has history of PAF. 1. Paroxysmal atrial fibrillation.  - Reported history, with RVR this am. Has never had a DCCV in the past and no AAD prior. States the episodes have lasted a few hours at home in the past, but usually subside on own. - [id number] = 2 (CVA)  - Current OAC regimen to start Eliquis today if ok with urology, he has not been on [address] PTA recently due to recurrent bladder masses with hematuria. - Current medical therapy includes Toprol XL. He states he has been on Xarelto, eliquis in the past with hematuria and stopped due to this in the past   - Previous attempts at rhythm control have been none  - Had an extensive discussion regarding options between rate and rhythm control and the patient has chosen rhythm control  - For rhythm control we have chosen to utilize AAD therapy which depends on stress test and echo results.   - Had an extensive discussion regarding all secondary causes of AF which include but are not limited to the following and then need for lifestyle modifications and stringent control of contributing medical conditions which include            - Weight Loss: aggressive weight loss and regular moderate cardiopulmonary exercise to maintain BMI <27 with a loss of at least 10% of their current weight which is safely and adequately maintained            - Exercise: 30min 3-4x/week of at least moderate cardiopulmonary exercise up to 250 min/wk            - Blood pressure: target of <130/80mmHg            - Dyslipidemia: add a statin if LDL >100mg/dL            - Diabetes Mellitus: add Metformin if HbA1c >6.5%            - Obstructive sleep apnea: evaluate for and or treat with CPAP if AHI >30/hour            - Abstinence from alcohol and tobacco products  - Discussed the potential improvement in all atrial fibrillation therapies if diet/exercise and weight loss are achieved per above. 2. Bifascicular block  - RBBB and LAFB. - Since at least 9/3/20 per available EKG. - No high grade AV block seen. 3.  History of DVT left leg and PE  - Provoked. - Post nephrectomy. - treated with Eliquis. 4. Renal cell cancer   - Status post robotic left nephrectomy at [facility] on 12/2019.  - Found to have metastasis in the left ureter and bladder. 5. Prostate cancer   - Status post chemotherapy. 6. Testicular cancer   - Status post removal of right testis mid 1990's. - Status post chemo therapy. 7. Bladder cancer with recurrent hematuria on [address] [place] [address] Road  - On regular interval cystoscopic mass removal and chemotherapy with Gemcitabine every 3-6 month. 8. Peripheral neuropathy. 9. Acute ischemic L M3 CVA   - Out of tPA window. - CT brain perfusion showed ischemic penumbra in the left parietal lobe. - CTA head and neck showed left M3 occlusion.  - MRI brain showed no evidence of acute intracranial abnormality  - Neurology consulted. Recommendations:  Continue Toprol XL at 50 mg bid. Start Eliquis 5 mg bid if no contraindication when is OK by Neurology. Cardizem bolus and gtt today, if converts back to SR will consider adding amiodarone to maintain SR   Patient has established appointment with [facility] to discuss rhythm control as well as Watchman procedure next month. Thank you for allowing me to participate in your patient's care. Please call me if there are any questions or concerns.       Lakia Sepulveda, KARINA - CNP  Cardiac Electrophysiology  [facility]  [facility] [place] [place] [facility]  8:58 AM  1/11/2023    Attending [phone] Statement    Patient seen with the ANP. Agree with the findings, assessment and plan. Management plan was discussed. I have personally interviewed the patient, independently performed a focused cardiac examination, reviewed the pertinent laboratory and diagnostic testing with the patient and directly participated in the medical decision-making as noted above with the following additions:     Feeling well. Developed AF with RVR on 1/11/23 at 6.19 AM. Neurology is OK with start Eliquis if OK by Urology. Physical exam showed no JVD, IRIR, tachycardic,  no murmur, clear lungs bilaterally, no edema    Will start IV Cardizem drip. Start Eliquis 5 mg bid if OK by Urology. Increase Toprol XL to 50 mg bid. If converts back to NSR, would consider add Amiodarone versus Tokosyn to regimen. Risks, benefits and alternatives of AAD discussed. I have spent a total of 35 CCT minutes with the patient and the family reviewing the above stated recommendations. And a total of >50% of that time involved face-to-face time providing counseling and/or coordination of care with the other providers, reviewing records/tests, counseling/education of the patient, ordering medications/tests/procedures, coordinating care, and documenting clinical information in the EHR.      Alondra Alvarez MD  Cardiac Electrophysiology  [address]  [facility] [place] [place] [facility]  9:42 AM  1/11/2023

## 2023-01-11 NOTE — CONSULTS
Comprehensive Nutrition Assessment    Type and Reason for Visit:  Initial, Consult (Oral Nutrition Supplement)    Nutrition Recommendations/Plan:   Continue current diet order   Recommend ONS Ensure Enlive BID to optimize nutrition status. Malnutrition Assessment:  Malnutrition Status: At risk for malnutrition (Comment) (01/11/23 4398)    Context:  Acute Illness     Findings of the 6 clinical characteristics of malnutrition:  Energy Intake:  Mild decrease in energy intake (Comment)  Weight Loss:  Unable to assess     Body Fat Loss:  No significant body fat loss     Muscle Mass Loss:  No significant muscle mass loss    Fluid Accumulation:  No significant fluid accumulation     Strength:  Not Performed    Nutrition Assessment:    Pt admit w/ CVA & Afib RVR. speech near baseline, neuro & SLP following. PMHx of Pulmonary embolism, Renal cell carcinoma s/p L nephrectomy, Bladder mass, testicular & prostate cancer, DVT, Peripheral neuropathy. Pt is on regular diet & has been tolerating, but appetite/intake decreased ~50% of meals. Will add ONS Ensure Enlive BID, will continue to monitor. Nutrition Related Findings:    abd soft, round, nontender, nondistended, active BS x4, no edema noted, I/O's -1.39L, A&Ox4, expressive aphasia Wound Type: None       Current Nutrition Intake & Therapies:    Average Meal Intake: 51-75%  Average Supplements Intake: None Ordered  ADULT DIET; Regular  ADULT ORAL NUTRITION SUPPLEMENT; Breakfast, Dinner; Standard High Calorie/High Protein Oral Supplement    Anthropometric Measures:  Height: 5' 11\" (180.3 cm)  Ideal Body Weight (IBW): 172 lbs (78 kg)    Admission Body Weight: 260 lb (117.9 kg) (1/9 admission wt, unspecified)  Current Body Weight: 260 lb (117.9 kg) (1/9 admission wt, unspecified), 151.2 % IBW.  Weight Source: Not Specified  Current BMI (kg/m2): 36.3  Usual Body Weight:  (EMILIA d/t lack of wt hx per EMR)  Weight Adjustment For: No Adjustment  BMI Categories: Obese Class 2 (BMI 35.0 -39.9)    Estimated Daily Nutrient Needs:  Energy Requirements Based On: Formula  Weight Used for Energy Requirements: Current  Energy (kcal/day): 2300-2500kcal/day  Weight Used for Protein Requirements: Ideal  Protein (g/day): 125-140g/day (1.6-1.8g/kg IBW )  Method Used for Fluid Requirements: 1 ml/kcal  Fluid (ml/day): 1ml/kcal or 2300-2500ml/day    Nutrition Diagnosis:   Inadequate oral intake related to cognitive or neurological impairment as evidenced by intake 51-75%    Nutrition Interventions:   Food and/or Nutrient Delivery: Continue Current Diet, Start Oral Nutrition Supplement (Recommend ONS Ensure Enlive BID to optimize nutrition status)  Nutrition Education/Counseling: No recommendation at this time  Coordination of Nutrition Care: Continue to monitor while inpatient    Goals:  Goals: PO intake 75% or greater, by next RD assessment    Nutrition Monitoring and Evaluation:   Behavioral-Environmental Outcomes: None Identified  Food/Nutrient Intake Outcomes: Supplement Intake, Food and Nutrient Intake  Physical Signs/Symptoms Outcomes: Biochemical Data, Chewing or Swallowing, GI Status, Fluid Status or Edema, Skin, Nutrition Focused Physical Findings, Weight      Discharge Planning:     Too soon to determine       John Hahn RD  Contact:

## 2023-01-11 NOTE — PROGRESS NOTES
Physical Therapy    Physical Therapy Initial Assessment     Name: Lisa Everett  : 1960  MRN: [id number]      Date of Service: 2023    Evaluating PT:  Rodger Buitrago PT, DPT  [id number]     Room #:  4302/5762-W  Diagnosis:  Cerebrovascular accident (CVA) due to occlusion of left middle cerebral artery ([facility] [address] [I63.512]  Stroke due to occlusion of left middle cerebral artery (HCC) [I63.512]  PMHx/PSHx:   has a past medical history of Anemia, Cancer of kidney ([facility] [address] DVT of leg (deep venous thrombosis) ([facility] [address] Prostate cancer ([facility] [address] Solitary kidney, and Testicular cancer ([facility] [address] Procedure/Surgery:  Cardiac stress test 1/10  Precautions:  Falls  Equipment Needs:  NA    SUBJECTIVE:    Pt lives with his wife in a 1 story home with 2 stairs and 1 rail to enter. Bedroom and bathroom are on the 1st level. Pt ambulated with no AD PTA. OBJECTIVE:   Initial Evaluation  Date: 23 Treatment Short Term/ Long Term   Goals   AM-PAC 6 Clicks 97/59     Was pt agreeable to Eval/treatment? Yes      Does pt have pain? No c/o pain      Bed Mobility  Rolling: NT  Supine to sit: NT  Sit to supine: NT  Scooting: NT  Rolling: Independent   Supine to sit: Independent   Sit to supine: Independent   Scooting: Independent    Transfers Sit to stand: SBA  Stand to sit: SBA  Stand pivot: SBA  Sit to stand: Independent   Stand to sit:  Independent   Stand pivot: Independent    Ambulation    40 feet with no AD SBA    Limited by Afib  >300 feet with no AD Independent    Stair negotiation: ascended and descended  NT  >4 steps with 1 rail Independent    ROM BUE:  Per OT eval  BLE:  WFl     Strength BUE:  Per OT eval  BLE:  5/5     Balance Sitting EOB:  SBA  Dynamic Standing:  SBA  Sitting EOB:  Independent   Dynamic Standing:  Independent      Pt is A & O x 4  RASS:  0  CAM-ICU:  NT  Sensation:  Pt denies numbness and tingling to extremities  Edema:  Unremarkable    Vitals:  Blood Pressure at rest 108/70 mmHg  Blood Pressure post session 110/72 mmHg   Heart Rate at rest 111 bpm  Heart Rate post session 118 bpm    SPO2 at rest --% on RA SPO2 post session 95% on RA       Therapeutic Exercises:    BLE AROM    Patient education  Pt educated on PT role, safety during functional mobility    Patient response to education:   Pt verbalized understanding Pt demonstrated skill Pt requires further education in this area   Yes  Yes  no     ASSESSMENT:    Conditions Requiring Skilled Therapeutic Intervention:    []Decreased strength     []Decreased ROM  [x]Decreased functional mobility  [x]Decreased balance   [x]Decreased endurance   []Decreased posture  []Decreased sensation  []Decreased coordination   []Decreased vision  []Decreased safety awareness   []Increased pain       Comments:  Pt received sitting in chair and agreeable to PT evaluation with OT collaboration. Pt cleared for participation by RN prior to session. Vitals monitored during session. Pt demonstrates fair strength and mobility. Limited by Afib. -120 at rest 140-160 bpm with activity. Pt able to stand and ambulate and perform ADLs at sink without incident. Returned to chair after session. Pt left with call button in reach, lines attached, and needs met. No PT needs anticipated at d/c but will follow throughout hospital course. Treatment:  Patient practiced and was instructed in the following treatment:    STS and pivot transfer training - pt educated on proper hand and foot placement, safety and sequencing, and use of no AD to safely complete sit<>stand and pivot transfers with hands on assistance to complete task safely    Gait training- pt was given verbal and tactile cues to facilitate safety/balance, HR monitoring during ambulation as well as provided with physical assistance. Pt's/ family goals   1. Home     Prognosis is good for reaching above PT goals. Patient and or family understand(s) diagnosis, prognosis, and plan of care.   yes    PHYSICAL THERAPY PLAN OF CARE:    PT POC is established based on physician order and patient diagnosis     Referring provider/PT Order:  Yahaira Reyes MD / PT eval and treat   Diagnosis:  Cerebrovascular accident (CVA) due to occlusion of left middle cerebral artery ([facility] [address] [I63.512]  Stroke due to occlusion of left middle cerebral artery ([facility] [address] [I63.512]  Specific instructions for next treatment:  Progress gait/stairs as able     Current Treatment Recommendations:     [] Strengthening to improve independence with functional mobility   [] ROM to improve independence with functional mobility   [x] Balance Training to improve static/dynamic balance and to reduce fall risk  [x] Endurance Training to improve activity tolerance during functional mobility   [x] Transfer Training to improve safety and independence with all functional transfers   [x] Gait Training to improve gait mechanics, endurance and asses need for appropriate assistive device  [x] Stair Training in preparation for safe discharge home and/or into the community   [] Positioning to prevent skin breakdown and contractures  [] Safety and Education Training   [] Patient/Caregiver Education   [] HEP  [] Other     PT long term treatment goals are located in above grid    Frequency of treatments: 2-5x/week x 1-2 weeks. Time in  1005  Time out  1026    Total Treatment Time  10 minutes     Evaluation Time includes thorough review of current medical information, gathering information on past medical history/social history and prior level of function, completion of standardized testing/informal observation of tasks, assessment of data and education on plan of care and goals.     CPT codes:  [x] Low Complexity PT evaluation 24608  [] Moderate Complexity PT evaluation 81304  [] High Complexity PT evaluation 69046  [] PT Re-evaluation 58628  [] Gait training 93728 -- minutes  [] Manual therapy 17267 -- minutes  [x] Therapeutic activities 30177 10 minutes  [] Therapeutic exercises 41975 - minutes  [] Neuromuscular reeducation 66226 -- minutes     Rita Rangel, PT, DPT  [id number]

## 2023-01-11 NOTE — CONSULTS
1/11/2023 3:41 PM  Maryam Alvarado  [id number]     Chief Complaint:    Multiple  cancers, recent TURBT 6 days ago with Dr. Mary Lou Polanco, now admitted for stroke, asking if ok to start Eliquis      History of Present Illness: The patient is a 65 y.o. male patient who presented to the hospital 2 days ago with complaints of aphasia and right sided weakness. He was admitted for acute CVA. He has a history of atrial fibrillation and reports he has not been on any blood thinners secondary to his history of bladder cancer. MICU attempted to reach his urologist regarding anticoagulation and they were unable to provide them with any information. We were asked to evaluate him in regards to starting Eliquis.     He is a patient of Dr. 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 Mary Lou Polanco   He just had cystoscopy with TURBT with instillation of Gemcitabine on 1/5/23 at [facility] with Dr. Mary Lou Polanco  He had been off of his Aspirin for the procedure  He has been on Eliquis in the past but he states this has been held by his Urologist secondary to his continued hematuria in the past  Currently he is urinating without any trouble  He denies any gross hematuria     Past Medical History:   Diagnosis Date    Anemia     Cancer of kidney ([address]     left    DVT of leg (deep venous thrombosis) ([facility] [address]     right    Prostate cancer ([facility] [address]     Solitary kidney     Testicular cancer [facility])          Past Surgical History:   Procedure Laterality Date    COLONOSCOPY      SHOULDER SURGERY      TESTICLE REMOVAL Right     WISDOM TOOTH EXTRACTION         Medications Prior to Admission:    Medications Prior to Admission: aspirin 81 MG EC tablet, Take 81 mg by mouth daily  calcium carbonate (CALTRATE 600) 1500 (600 Ca) MG TABS tablet, Take 600 mg by mouth daily  metoprolol succinate (TOPROL XL) 200 MG extended release tablet, Take 200 mg by mouth daily  Multiple Vitamin (MULTI VITAMIN DAILY) TABS, Take 1 tablet by mouth daily  atorvastatin (LIPITOR) 10 MG tablet, Take 10 mg by mouth at bedtime  gabapentin (NEURONTIN) 300 MG capsule, Take 600 mg by mouth 2 times daily. omeprazole (PRILOSEC) 40 MG delayed release capsule, Take 40 mg by mouth daily  vitamin D (CHOLECALCIFEROL) 50 MCG (2000 UT) TABS tablet, Take 2,000 Units by mouth daily    Allergies:    Augmentin [amoxicillin-pot clavulanate]    Social History:    reports that he quit smoking about 3 years ago. His smoking use included cigarettes. He has a 12.50 pack-year smoking history. He has never used smokeless tobacco. He reports that he does not currently use alcohol. He reports that he does not use drugs. Family History:   Non-contributory to this Urological problem  family history includes Hypertension in his father. Review of Systems:  Constitutional: No fever or chills   Respiratory: negative for cough and hemoptysis  Cardiovascular: negative for chest pain and dyspnea  Gastrointestinal: negative for abdominal pain, diarrhea, nausea and vomiting   Derm: negative for rash and skin lesion(s)  Neurological: right sided weakness   Musculoskeletal: Negative    Psychiatric: Negative   : As above in the HPI, otherwise negative  All other reviews are negative    Physical Exam:     Vitals:  BP 91/70   Pulse 98   Temp 97.5 °F (36.4 °C) (Temporal)   Resp 18   Ht 5' 11\" (1.803 m)   Wt 260 lb (117.9 kg)   SpO2 95%   BMI 36.26 kg/m²     General:  Awake, alert, oriented X 3. No apparent distress. HEENT:  Normocephalic, atraumatic. Lungs:  Respirations symmetric and non-labored. Abdomen:  soft, nontender, no masses  Extremities:  No clubbing, cyanosis, or edema  Skin:  Warm and dry, no open lesions or rashes  Neuro:  There are no motor or sensory deficits in the 4 quadrant extremities   Rectal: deferred  Genitourinary:  no wei     Labs:     Recent Labs     01/10/23  0006 01/10/23  0407 01/11/23  0438   WBC 7.7 7.1 8.3   RBC 4.37 4.17 4.59   HGB 12.8 12.1* 13.0   HCT 35.2* 34.8* 38.2   MCV 80.5 83.5 83.2   MCH 29.3 29.0 28.3   MCHC 36.4* 34.8* 34.0   RDW 13.5 13.6 13.5    212 196   MPV 11.0 10.8 10.4         Recent Labs     01/10/23  0006 01/10/23  0407 01/11/23  0438   CREATININE 1.2 1.2 1.1       No results found for: PSA      Assessment/plan:  UTUC s/p left robotic nephroureterectomy in 2019  PCA s/p EBRT (Dr. Rajat Luis)  Testicular cancer s/p right radical orchiectomy  Bladder cancer s/p TURBT 1/5/23  Solitary right kidney   Acute CVA   Gross hematuria     Spoke with ICU earlier today. He is of course at increased risk of bleeding with any anticoagulation given his extensive  cancer history/radiation history/and recently had bladder tumor resection on 1/5/23 with Dr. Earl Alert  Apparently his anticoagulation has been placed on hold by his Urologist in the past due to recurrent gross hematuria   If benefits of anticoagulation outweigh the risks of bleeding, ok to start the patient on Eliquis and will need to be monitored closely for hematuria. Ultimately will leave this to primary/neurology to decide  Monitor for hematuria in the meantime, denies currently   Pathology from his TURBT on 1/5/23 with Dr. Kate Cleveland.  Stille shows low-grade papillary urothelial carcinoma, noninvasive   Continued management of his  cancers and recurrent gross hematuria per Dr. Earl Alert  If hematuria occurs, He would benefit from possible watchman procedure sooner than his scheduled date of 1/23 at [facility] due to his complex history and his continued risk of bleeding  Will follow       Electronically signed by KARINA Hendrix CNP on 1/11/2023 at 3:41 PM  [facility]     Agree with above assessment and plan  Pt voiding clear urine  eliquis to be re starte  Pt will follow up with dr Marene Alpers

## 2023-01-11 NOTE — RT PROTOCOL NOTE
RT Inhaler-Nebulizer Bronchodilator Protocol Note    There is a bronchodilator order in the chart from a provider indicating to follow the RT Bronchodilator Protocol and there is an Initiate RT Inhaler-Nebulizer Bronchodilator Protocol order as well (see protocol at bottom of note). CXR Findings:  No results found. The findings from the last RT Protocol Assessment were as follows:   History Pulmonary Disease: None or smoker <15 pack years  Respiratory Pattern: Regular pattern and RR 12-20 bpm  Breath Sounds: Clear breath sounds  Cough: Strong, spontaneous, non-productive  Indication for Bronchodilator Therapy:    Bronchodilator Assessment Score: 0    Aerosolized bronchodilator medication orders have been revised according to the RT Inhaler-Nebulizer Bronchodilator Protocol below. Respiratory Therapist to perform RT Therapy Protocol Assessment initially then follow the protocol. Repeat RT Therapy Protocol Assessment PRN for score 0-3 or on second treatment, BID, and PRN for scores above 3. No Indications - adjust the frequency to every 6 hours PRN wheezing or bronchospasm, if no treatments needed after 48 hours then discontinue using Per Protocol order mode. If indication present, adjust the RT bronchodilator orders based on the Bronchodilator Assessment Score as indicated below. Use Inhaler orders unless patient has one or more of the following: on home nebulizer, not able to hold breath for 10 seconds, is not alert and oriented, cannot activate and use MDI correctly, or respiratory rate 25 breaths per minute or more, then use the equivalent nebulizer order(s) with same Frequency and PRN reasons based on the score. If a patient is on this medication at home then do not decrease Frequency below that used at home.     0-3 - enter or revise RT bronchodilator order(s) to equivalent RT Bronchodilator order with Frequency of every 4 hours PRN for wheezing or increased work of breathing using Per Protocol order mode. 4-6 - enter or revise RT Bronchodilator order(s) to two equivalent RT bronchodilator orders with one order with BID Frequency and one order with Frequency of every 4 hours PRN wheezing or increased work of breathing using Per Protocol order mode. 7-10 - enter or revise RT Bronchodilator order(s) to two equivalent RT bronchodilator orders with one order with TID Frequency and one order with Frequency of every 4 hours PRN wheezing or increased work of breathing using Per Protocol order mode. 11-13 - enter or revise RT Bronchodilator order(s) to one equivalent RT bronchodilator order with QID Frequency and an Albuterol order with Frequency of every 4 hours PRN wheezing or increased work of breathing using Per Protocol order mode. Greater than 13 - enter or revise RT Bronchodilator order(s) to one equivalent RT bronchodilator order with every 4 hours Frequency and an Albuterol order with Frequency of every 2 hours PRN wheezing or increased work of breathing using Per Protocol order mode. RT to enter RT Home Evaluation for COPD & MDI Assessment order using Per Protocol order mode.     Electronically signed by Dinesh Rose on 1/11/2023 at 12:44 PM

## 2023-01-11 NOTE — PLAN OF CARE
Problem: Chronic Conditions and Co-morbidities  Goal: Patient's chronic conditions and co-morbidity symptoms are monitored and maintained or improved  Outcome: Progressing     Problem: Discharge Planning  Goal: Discharge to home or other facility with appropriate resources  Outcome: Progressing     Problem: Pain  Goal: Verbalizes/displays adequate comfort level or baseline comfort level  Outcome: Progressing  Flowsheets (Taken 1/10/2023 0800 by Jarett Shannon RN)  Verbalizes/displays adequate comfort level or baseline comfort level:   Encourage patient to monitor pain and request assistance   Assess pain using appropriate pain scale   Administer analgesics based on type and severity of pain and evaluate response   Implement non-pharmacological measures as appropriate and evaluate response   Consider cultural and social influences on pain and pain management   Notify Licensed Independent Practitioner if interventions unsuccessful or patient reports new pain     Problem: ABCDS Injury Assessment  Goal: Absence of physical injury  Outcome: Progressing     Problem: Safety - Adult  Goal: Free from fall injury  Outcome: Progressing

## 2023-01-11 NOTE — PROGRESS NOTES
[facility]  Department of Internal Medicine   Internal Medicine Residency   MICU Progress Note    Patient:  Abraham Lagunas 65 y.o. male  MRN: [id number]     Date of Service: 1/11/2023    Allergy: Augmentin [amoxicillin-pot clavulanate]    Subjective     The patient was seen and examined in the morning today. Overnight the patient had no significant complaints. He did require a dose of melatonin to help him for sleep. NM Lexiscan stress test was normal without any ischemia or infarct and without regional wall motion abnormalities. In the morning today the patient stated of having improved symptoms compared to the previous. Heart rate of the patient did went up to 180s in the morning today but he was asymptomatic. The patient had an episode of A. Fib RVR and was treated with metoprolol. Mg was also replaced. ROS: Denies Fever/chills/CP/SOB/N/V/D/C/Dysuria/Blood in stool or urine  Objective     VS: BP (!) 141/90   Pulse 59   Temp 98.1 °F (36.7 °C) (Oral)   Resp 20   Ht 5' 11\" (1.803 m)   Wt 260 lb (117.9 kg)   SpO2 98%   BMI 36.26 kg/m²           I & O - 24hr:   Intake/Output Summary (Last 24 hours) at 1/11/2023 0328  Last data filed at 1/10/2023 2307  Gross per 24 hour   Intake 60 ml   Output 1075 ml   Net -1015 ml       Physical Exam:  General Appearance: alert, appears stated age, and cooperative  Neck: no adenopathy, no carotid bruit, no JVD, and supple, symmetrical, trachea midline  Lung: clear to auscultation bilaterally  Heart: regular rate and rhythm, S1, S2 normal, no murmur, click, rub or gallop  Abdomen: soft, non-tender; bowel sounds normal; no masses,  no organomegaly  Extremities:  extremities normal, atraumatic, no cyanosis or edema  Musculoskeletal: No joint swelling, no muscle tenderness. ROM normal in all joints of extremities.    Neurologic: Mental status: Alert, oriented, thought content appropriate,               Cranial nerves: II to XII within normal range Sensory: within normal range              Motor: power 5/5 in all muscle group              Cerebellar: Could do nose to finger and heel shin test       Lines     site day    Art line   None    TLC None    PICC None    Hemoaccess None    Oxygen:    None  ABG:   No results found for: PHART, PH, QSU4QWL, PCO2, PO2ART, PO2, DZA6NUR, HCO3, BEART, BE, THGBART, THB, SBR4WOF, V1NSOYKU, O2SAT     Medications     Infusions: (Fluid, Sedation, Vasopressors)  None    Nutrition:   Adult diet     ATB:   Antibiotics  Days   None              Patient currently has     DVT prophylaxis/ GI prophylaxis: Lovenox/ Adult diet  PT/OT    CBC with Differential:    Lab Results   Component Value Date/Time    WBC 8.3 01/11/2023 04:38 AM    RBC 4.59 01/11/2023 04:38 AM    HGB 13.0 01/11/2023 04:38 AM    HCT 38.2 01/11/2023 04:38 AM     01/11/2023 04:38 AM    MCV 83.2 01/11/2023 04:38 AM    MCH 28.3 01/11/2023 04:38 AM    MCHC 34.0 01/11/2023 04:38 AM    RDW 13.5 01/11/2023 04:38 AM    SEGSPCT 76 07/07/2011 05:05 AM    LYMPHOPCT 34.3 01/11/2023 04:38 AM    MONOPCT 5.1 01/11/2023 04:38 AM    BASOPCT 0.5 01/11/2023 04:38 AM    MONOSABS 0.42 01/11/2023 04:38 AM    LYMPHSABS 2.83 01/11/2023 04:38 AM    EOSABS 0.21 01/11/2023 04:38 AM    BASOSABS 0.04 01/11/2023 04:38 AM     Hemoglobin/Hematocrit:    Lab Results   Component Value Date/Time    HGB 13.0 01/11/2023 04:38 AM    HCT 38.2 01/11/2023 04:38 AM     CMP:    Lab Results   Component Value Date/Time     01/11/2023 04:38 AM    K 4.1 01/11/2023 04:38 AM    K 4.2 09/03/2020 08:22 AM     01/11/2023 04:38 AM    CO2 25 01/11/2023 04:38 AM    BUN 17 01/11/2023 04:38 AM    CREATININE 1.1 01/11/2023 04:38 AM    GFRAA >60 09/03/2020 08:22 AM    LABGLOM >60 01/11/2023 04:38 AM    GLUCOSE 110 01/11/2023 04:38 AM    GLUCOSE 59 08/08/2011 12:00 PM    PROT 6.6 01/11/2023 04:38 AM    LABALBU 4.0 01/11/2023 04:38 AM    LABALBU 4.2 08/08/2011 12:00 PM    CALCIUM 9.4 01/11/2023 04:38 AM BILITOT 0.4 01/11/2023 04:38 AM    ALKPHOS 62 01/11/2023 04:38 AM    AST 31 01/11/2023 04:38 AM    ALT 16 01/11/2023 04:38 AM     Hepatic Function Panel:    Lab Results   Component Value Date/Time    ALKPHOS 62 01/11/2023 04:38 AM    ALT 16 01/11/2023 04:38 AM    AST 31 01/11/2023 04:38 AM    PROT 6.6 01/11/2023 04:38 AM    BILITOT 0.4 01/11/2023 04:38 AM    BILIDIR <0.2 01/11/2023 04:38 AM    IBILI see below 01/11/2023 04:38 AM    LABALBU 4.0 01/11/2023 04:38 AM    LABALBU 4.2 08/08/2011 12:00 PM     Albumin:    Lab Results   Component Value Date/Time    LABALBU 4.0 01/11/2023 04:38 AM    LABALBU 4.2 08/08/2011 12:00 PM     Calcium:    Lab Results   Component Value Date/Time    CALCIUM 9.4 01/11/2023 04:38 AM     Ionized Calcium:  No results found for: IONCA  Magnesium:    Lab Results   Component Value Date/Time    MG 1.9 01/11/2023 04:38 AM           Resident's Assessment and Plan     Neurology      Acute ischemic left parietal lobe stroke   - symptoms almost resolved compared to admission  - was not on ASA 81mg for a week time   - CT  brain perfusion showed ischemic penumbra in the left parietal lobe   - CTA brain and neck showed left M3 occlusion, mild atherosclerotic disease   - Aspirin 325 mg and Plavix 300 mg given, patient out of the TNK window   - Neurology consulted advised to discontinue plavix and aspirin if urology team okay and start on Eliquis, recommend for Watchman procedure for a sooner date  - TSH 5.190, T4 normal  - ECHO showed EF 60-65%, Grade II diastolic dysfunction, no RWMA, biatrial dilation, mild MR        Cardiology     Hx of Paroxysmal A Fib with RVR   - [id number] score 2  -  on metoprolol 200 mg QD  - was on ASA 81mg daily at home  - no episodes of palpitations in the past few days   - follows with a cardiologist in [place]  - Was on holter monitor in July 2022 for 1 month and had episodes of paraoxysmal A fib  - Not under DOAC due to bladder cancer and hematuria  - Continue on Metoprolol 200 mg QD  - NM Lexiscan stress test was normal without any ischemia or infarct and without regional wall motion abnormalities.    - EP consulted and recommended to resume toprol XL 50 mg BID, ECHO and Nuclear stress test and consider outpatient cardiac monitor to evaluate AF burden   - Had an episode of A Fib RVR with HR up to 180s, toprol given along with magnesium replacement     Hx of HLD   - on Lipitor 10 mg daily   - Start on 80 MG Lipitor nightly  - Lipid panel showed LDL 65,  and HDL 29  - Continue on high intensity statin     Pulmonology      Hx of Pulmonary embolism  - PE after 5 days of left nephrectomy   - was initally kept on Lovenox daily for 4 months and Eliquis 5 mg BID for 2-3 more months   - no any symptoms of swelling and pain in the limbs   - Not under any DOAC currently      Nephrology     Renal cell carcinoma S/P left nephrectomy at [facility] 12/2019  - Robotic left nephrectomy done at [facility] in 12/2019  - was found to have metastatis in the left ureter and bladder   - Patient tolerating well with the single kidney   - Cr 1.3, baseline   - Monitor BMP daily      Heme- Onc     Bladder mass most likely mets from [address]  - diagnosed with bladder mass after left nephrectomy  - on regular interval cystoscopic mass removal and chemotherapy with Gemcitabine EVERY 3-6 monthly   - history of on and off hematuria  - Hb today 13.0  - Monitor H and H      Hx of Testicular Cancer S/P Rt testis  - Removed Rt testis in the mid 1990s  - Extensive chemo was done after the surgery      Hx of Prostate Cancer S/P 42 cycles of Chemotherapy  - regular follow up with PSA as per patient   - PSA in the normal range      Hx of DVT left leg   - DVT of the left femoral vein after 5 days of post surgery for left nephrectomy   - was initally kept on Lovenox daily for 4 months and Eliquis 5 mg BID for 2-3 more months   - no any symptoms of swelling and pain in the limbs   - Not under any DOAC currently      MSK      Hx of Peripheral Neuropathy   - On Gabapentin 300 mg BID   - Resume if patient stable    Kasie Noland MD, PGY-1    Attending physician: Dr. Sea Lomeli

## 2023-01-11 NOTE — PROGRESS NOTES
SPEECH/LANGUAGE PATHOLOGY  SPEECH/LANGUAGE/COGNITIVE EVALUATION   and PLAN OF CARE      PATIENT NAME:  oJy Germain  (male)     MRN:  [id number]    :  1960  (65 y.o.)  STATUS:  Inpatient: Room 4422/4422-A    TODAY'S DATE:  2023  REFERRING PROVIDER: Teddy Murphy MD     SPECIFIC PROVIDER ORDER: speech language pathology (SLP) eval and treat  Date of order:  23  REASON FOR REFERRAL:  To assess cognitive linguistic and speech skills s/p recent hospital admission. EVALUATING THERAPIST: MINOO Mcghee    ADMITTING DIAGNOSIS: Cerebrovascular accident (CVA) due to occlusion of left middle cerebral artery ([address] [I63.512]  Stroke due to occlusion of left middle cerebral artery ([address] [I63.512]    VISIT DIAGNOSIS:   Visit Diagnoses         Codes    Cerebrovascular accident (CVA) due to occlusion of left middle cerebral artery ([address]    -  Primary I63.512               SPEECH THERAPY  PLAN OF CARE   The speech therapy  POC is established based on physician order, speech pathology diagnosis and results of clinical assessment     SPEECH PATHOLOGY DIAGNOSIS:    Mild cognitive deficits; minimal-mild anomia; minimal dysarthria     Speech Pathology intervention is recommended 3-6 times per week for LOS or when goals are met with emphasis on the following:   Anticipate Patient will benefit from ongoing speech therapy at discharge due to degree of deficits     Conditions Requiring Skilled Therapeutic Intervention for speech, language and/or cognition    Anomia  Dysarthria   Decreased short term memory  Decreased problem solving skills     Specific Speech Therapy Interventions to Include:   Expressive language training   Therapeutic tasks for Cognition  Therapeutic exercises for dysarthria    Specific instructions for next treatment: To initiate POC    SHORT/LONG TERM GOALS  Pt will improve orientation to spatial and temporal surroundings with use of external memory aides.   Pt will improve immediate, short term, recent memory during structured and unstructured tasks with 75% accuracy   Pt will improve problem solving/thought organization during structured and unstructured tasks with 75% accuracy   Pt will improve word finding and verbal fluency with phrase/sentence level, wh-questions and open ended conversation through incorporation of preparatory and circumlocution strategies 75% accuracy  Pt will improve expressive language skills to improve accuracy of completion of automatic speech tasks, object/picture naming, phrase completion, and phrasal expression of basic wants and needs with 75% accuracy  Pt will improve speech intelligibility and increased articulatory precision via compensatory strategies and oral motor exercises     Patient goals: Patient/family involved in developing goals and treatment plan:   Treatment goals discussed with Patient    The Patient understand(s) the diagnosis, prognosis and plan of care   The patient/family Agreed with above,     This plan may be re-evaluated and revised as warranted. Rehabilitation Potential/Prognosis: fair-good                CLINICAL ASSESSMENT:  MOTOR SPEECH       Oral Peripheral Examination   Right labiobuccal weakness-minimal to mild     Parameters of Speech Production  Respiration:  Adequate for speech production  Articulation:  Occasionally slurred   Resonance:  Within functional limits  Quality:   Within functional limits  Pitch: Within functional limits  Intensity: Within functional limits  Fluency:  Intact  Prosody Intact    RECEPTIVE LANGUAGE    Comprehension of Yes/No Questions:    Within functional limits    Process  Simple Verbal Commands:   Within functional limits  Process Intermediate Verbal Commands:   Within functional limits  Process Complex Verbal Commands:     Within functional limits    Comprehension of Conversation:      Within functional limits      EXPRESSIVE LANGUAGE     Serials: Functional    Imitation:  Words Functional   Sentences Functional    Naming:  (Modality used:  Verbal)  Confrontation Naming  Functional  Functional Description  DNT  Response Naming: Functional    Conversation:      Pt reported noticing anomia and difficulty thinking of correct words at times; minimal instances of this noted during conversation this date. COGNITION     Attention/Orientation  Attention: Sustained attention   Orientation:  Oriented to Person, Place (self corrected to city), all temporal concepts, Reason for hospitalization    Memory   Immediate Recall: Repeated 3/3    Delayed Recall:   Recalled 1/3     Long Term Recall:   Recalled Address, Birthdate, Age, and Family    Organization/Problem Solving/Reasoning   Verbal Sequencing:   Functional        Verbal Problem solving:   Mildly Impaired          CLINICAL OBSERVATIONS NOTED DURING THE EVALUATION  Inconsistent responses                  EDUCATION:   The Speech Language Pathologist (SLP) completed education regarding results of evaluation and that intervention is warranted at this time. Learner: Patient  Education: Reviewed results and recommendations of this evaluation  Evaluation of Education:  Verbalizes understanding    Evaluation Time includes thorough review of current medical information, gathering information on past medical history/social history and prior level of function, completion of standardized testing/informal observation of tasks, assessment of data and education on plan of care and goals. CPT code:    58386  eval speech sound lang comprehension      The admitting diagnosis and active problem list, as listed below have been reviewed prior to initiation of this evaluation.         ACTIVE PROBLEM LIST:   Patient Active Problem List   Diagnosis    Cerebrovascular accident (CVA) due to occlusion of left middle cerebral artery (HCC)    Paroxysmal atrial fibrillation ([facility]     Tx note (60440): SLP educated pt re: ST POC, rec for ST upon d/c, slow rate of speaking to assist with speech intelligibility and word retrieval. SLP provided verbal cues to improve STM/recall; these not effective to improving recall this session. Pt reported noticing moments of anomia/difficulty with word processing but overall improving since recent CVA. Questions answered to best of SLP's ability. Pt did report wanting to return to work-SLP encouraged pt to check medical status and to check in with physician prior to doing so. Nurse in and aware of this. Pt left in room w/ callbell w/I reach following. RN reported no swallow issues noted and pt on reg diet, thin liquids; pt also observed for part of breakfast meal and no overt s/s of aspiration noted. Therefore, CSE does not appear indicated, nurse informed.

## 2023-01-12 VITALS
RESPIRATION RATE: 18 BRPM | OXYGEN SATURATION: 98 % | TEMPERATURE: 97.5 F | WEIGHT: 260 LBS | HEART RATE: 61 BPM | DIASTOLIC BLOOD PRESSURE: 64 MMHG | BODY MASS INDEX: 36.4 KG/M2 | SYSTOLIC BLOOD PRESSURE: 102 MMHG | HEIGHT: 71 IN

## 2023-01-12 LAB
ALBUMIN SERPL-MCNC: 4 G/DL (ref 3.5–5.2)
ALP BLD-CCNC: 68 U/L (ref 40–129)
ALT SERPL-CCNC: 19 U/L (ref 0–40)
ANION GAP SERPL CALCULATED.3IONS-SCNC: 15 MMOL/L (ref 7–16)
AST SERPL-CCNC: 31 U/L (ref 0–39)
BASOPHILS ABSOLUTE: 0.06 E9/L (ref 0–0.2)
BASOPHILS RELATIVE PERCENT: 0.8 % (ref 0–2)
BILIRUB SERPL-MCNC: 0.3 MG/DL (ref 0–1.2)
BILIRUBIN DIRECT: <0.2 MG/DL (ref 0–0.3)
BILIRUBIN, INDIRECT: NORMAL MG/DL (ref 0–1)
BUN BLDV-MCNC: 19 MG/DL (ref 6–23)
CALCIUM IONIZED: 1.25 MMOL/L (ref 1.15–1.33)
CALCIUM SERPL-MCNC: 9.6 MG/DL (ref 8.6–10.2)
CHLORIDE BLD-SCNC: 104 MMOL/L (ref 98–107)
CO2: 21 MMOL/L (ref 22–29)
CREAT SERPL-MCNC: 1.2 MG/DL (ref 0.7–1.2)
EKG ATRIAL RATE: 44 BPM
EKG P-R INTERVAL: 144 MS
EKG Q-T INTERVAL: 488 MS
EKG QRS DURATION: 130 MS
EKG QTC CALCULATION (BAZETT): 417 MS
EKG R AXIS: -37 DEGREES
EKG T AXIS: -20 DEGREES
EKG VENTRICULAR RATE: 44 BPM
EOSINOPHILS ABSOLUTE: 0.17 E9/L (ref 0.05–0.5)
EOSINOPHILS RELATIVE PERCENT: 2.2 % (ref 0–6)
GFR SERPL CREATININE-BSD FRML MDRD: >60 ML/MIN/1.73
GLUCOSE BLD-MCNC: 109 MG/DL (ref 74–99)
HCT VFR BLD CALC: 40.8 % (ref 37–54)
HEMOGLOBIN: 13.9 G/DL (ref 12.5–16.5)
IMMATURE GRANULOCYTES #: 0.02 E9/L
IMMATURE GRANULOCYTES %: 0.3 % (ref 0–5)
LYMPHOCYTES ABSOLUTE: 3.69 E9/L (ref 1.5–4)
LYMPHOCYTES RELATIVE PERCENT: 46.8 % (ref 20–42)
MAGNESIUM: 2.2 MG/DL (ref 1.6–2.6)
MCH RBC QN AUTO: 29.1 PG (ref 26–35)
MCHC RBC AUTO-ENTMCNC: 34.1 % (ref 32–34.5)
MCV RBC AUTO: 85.5 FL (ref 80–99.9)
MONOCYTES ABSOLUTE: 0.69 E9/L (ref 0.1–0.95)
MONOCYTES RELATIVE PERCENT: 8.8 % (ref 2–12)
NEUTROPHILS ABSOLUTE: 3.25 E9/L (ref 1.8–7.3)
NEUTROPHILS RELATIVE PERCENT: 41.1 % (ref 43–80)
PDW BLD-RTO: 13.4 FL (ref 11.5–15)
PHOSPHORUS: 3.7 MG/DL (ref 2.5–4.5)
PLATELET # BLD: 206 E9/L (ref 130–450)
PMV BLD AUTO: 10.7 FL (ref 7–12)
POTASSIUM SERPL-SCNC: 4.3 MMOL/L (ref 3.5–5)
RBC # BLD: 4.77 E12/L (ref 3.8–5.8)
SODIUM BLD-SCNC: 140 MMOL/L (ref 132–146)
TOTAL PROTEIN: 6.8 G/DL (ref 6.4–8.3)
WBC # BLD: 7.9 E9/L (ref 4.5–11.5)

## 2023-01-12 PROCEDURE — 99233 SBSQ HOSP IP/OBS HIGH 50: CPT | Performed by: INTERNAL MEDICINE

## 2023-01-12 PROCEDURE — 6370000000 HC RX 637 (ALT 250 FOR IP): Performed by: FAMILY MEDICINE

## 2023-01-12 PROCEDURE — 93005 ELECTROCARDIOGRAM TRACING: CPT | Performed by: INTERNAL MEDICINE

## 2023-01-12 PROCEDURE — 2500000003 HC RX 250 WO HCPCS: Performed by: NURSE PRACTITIONER

## 2023-01-12 PROCEDURE — 36415 COLL VENOUS BLD VENIPUNCTURE: CPT

## 2023-01-12 PROCEDURE — 85025 COMPLETE CBC W/AUTO DIFF WBC: CPT

## 2023-01-12 PROCEDURE — 82330 ASSAY OF CALCIUM: CPT

## 2023-01-12 PROCEDURE — 83735 ASSAY OF MAGNESIUM: CPT

## 2023-01-12 PROCEDURE — 80076 HEPATIC FUNCTION PANEL: CPT

## 2023-01-12 PROCEDURE — 93010 ELECTROCARDIOGRAM REPORT: CPT | Performed by: INTERNAL MEDICINE

## 2023-01-12 PROCEDURE — 84100 ASSAY OF PHOSPHORUS: CPT

## 2023-01-12 PROCEDURE — 2580000003 HC RX 258: Performed by: INTERNAL MEDICINE

## 2023-01-12 PROCEDURE — 80048 BASIC METABOLIC PNL TOTAL CA: CPT

## 2023-01-12 PROCEDURE — 6370000000 HC RX 637 (ALT 250 FOR IP): Performed by: NURSE PRACTITIONER

## 2023-01-12 RX ORDER — AMIODARONE HYDROCHLORIDE 200 MG/1
200 TABLET ORAL 2 TIMES DAILY
Status: DISCONTINUED | OUTPATIENT
Start: 2023-01-12 | End: 2023-01-12 | Stop reason: HOSPADM

## 2023-01-12 RX ORDER — METOPROLOL SUCCINATE 50 MG/1
50 TABLET, EXTENDED RELEASE ORAL 2 TIMES DAILY
Status: DISCONTINUED | OUTPATIENT
Start: 2023-01-12 | End: 2023-01-12 | Stop reason: HOSPADM

## 2023-01-12 RX ORDER — METOPROLOL SUCCINATE 50 MG/1
50 TABLET, EXTENDED RELEASE ORAL 2 TIMES DAILY
Qty: 180 TABLET | Refills: 1 | Status: SHIPPED | OUTPATIENT
Start: 2023-01-12

## 2023-01-12 RX ORDER — AMIODARONE HYDROCHLORIDE 200 MG/1
TABLET ORAL
Qty: 90 TABLET | Refills: 1 | Status: SHIPPED | OUTPATIENT
Start: 2023-01-12 | End: 2023-02-23

## 2023-01-12 RX ADMIN — SODIUM CHLORIDE, PRESERVATIVE FREE 10 ML: 5 INJECTION INTRAVENOUS at 09:18

## 2023-01-12 RX ADMIN — APIXABAN 5 MG: 5 TABLET, FILM COATED ORAL at 09:18

## 2023-01-12 RX ADMIN — AMIODARONE HYDROCHLORIDE 200 MG: 200 TABLET ORAL at 09:18

## 2023-01-12 RX ADMIN — PANTOPRAZOLE SODIUM 40 MG: 40 TABLET, DELAYED RELEASE ORAL at 06:48

## 2023-01-12 RX ADMIN — GABAPENTIN 600 MG: 300 CAPSULE ORAL at 09:18

## 2023-01-12 RX ADMIN — METOPROLOL SUCCINATE 50 MG: 50 TABLET, EXTENDED RELEASE ORAL at 09:18

## 2023-01-12 RX ADMIN — DEXTROSE MONOHYDRATE 5 MG/HR: 50 INJECTION, SOLUTION INTRAVENOUS at 06:48

## 2023-01-12 ASSESSMENT — PAIN SCALES - GENERAL: PAINLEVEL_OUTOF10: 0

## 2023-01-12 NOTE — PROGRESS NOTES
Hospitalist Progress Note      Synopsis: Patient admitted on 39/2023 50-year-old male past medical history of DVT on lower extremity, solitary kidney with history of kidney cancer, prostate cancer, testicular cancer, atrial fibrillation not on anticoagulation due to bladder cancer bleeding, former smoker presented today due to right-sided weakness. Patient has been off his aspirin for 7 days due to recent bleeding in his bladder. Presenting due to aphasia and right-sided weakness. Woke up 6 AM this morning had some difficulty using his right hand when he tried to open his pill bottles. Wife does notice difficulty with his speech and also numbness and weakness in his right side presents to the ER NIH of 4. From presentation was elevated at 176/39. CT brain perfusion showed concern for findings compatible with ischemic penumbra in the left parietal lobe. CT angio head and neck with left M3 occlusion. ER did speak to neuro interventionalists recommended loading with Plavix and aspirin. When I did see the patient his symptoms had resolved. He mentioned that his right side feels back to normal and speech has returned to normal.  He did mention he has a history of atrial fibrillation but was not on any blood thinner due to his history of bladder cancer. Taken off aspirin a week ago due to a bladder procedure. Subjective    Feeling better no complaint  No CP or SOB  No fever or chills   No uncontrolled pain  No vomiting or diarrhea   No events reported overnight     Exam:  /64   Pulse 61   Temp 97.5 °F (36.4 °C)   Resp 18   Ht 5' 11\" (1.803 m)   Wt 260 lb (117.9 kg)   SpO2 98%   BMI 36.26 kg/m²   General appearance: No apparent distress, appears stated age and cooperative. HEENT: Pupils equal, round, and reactive to light. Conjunctivae/corneas clear. Neck: Supple. No jugular venous distention. Trachea midline. Respiratory:  Normal respiratory effort.  Clear to auscultation, bilaterally without Rales/Wheezes/Rhonchi. Cardiovascular: Regular rate and rhythm with normal S1/S2 without murmurs, rubs or gallops. Abdomen: Soft, non-tender, non-distended with normal bowel sounds. Musculoskeletal: No clubbing, cyanosis or edema bilaterally. Brisk capillary refill. 2+radial pulses. Skin:  No rashes    Neurologic: awake, alert and following commands, occasional mildly slurred speech    Medications:  Reviewed    Infusion Medications    sodium chloride       Scheduled Medications    metoprolol succinate  50 mg Oral BID    amiodarone  200 mg Oral BID    apixaban  5 mg Oral BID    pantoprazole  40 mg Oral QAM AC    gabapentin  600 mg Oral Daily    atorvastatin  80 mg Oral Nightly    sodium chloride flush  5-40 mL IntraVENous 2 times per day     PRN Meds: ipratropium-albuterol, traMADol, [Held by provider] labetalol, sodium chloride flush, sodium chloride, ondansetron **OR** ondansetron, polyethylene glycol, acetaminophen **OR** acetaminophen, perflutren lipid microspheres    I/O    Intake/Output Summary (Last 24 hours) at 1/12/2023 1009  Last data filed at 1/12/2023 0954  Gross per 24 hour   Intake 660 ml   Output --   Net 660 ml         Labs:   Recent Labs     01/10/23  0407 01/11/23  0438 01/12/23  0614   WBC 7.1 8.3 7.9   HGB 12.1* 13.0 13.9   HCT 34.8* 38.2 40.8    196 206         Recent Labs     01/10/23  0407 01/11/23  0438 01/12/23  0614    139 140   K 4.1 4.1 4.3    103 104   CO2 22 25 21*   BUN 18 17 19   CREATININE 1.2 1.1 1.2   CALCIUM 8.7 9.4 9.6   PHOS 3.1 3.5 3.7         Recent Labs     01/10/23  0407 01/11/23  0438 01/12/23  0614   PROT 6.2* 6.6 6.8   ALKPHOS 57 62 68   ALT 14 16 19   AST 29 31 31   BILITOT <0.2 0.4 0.3         Recent Labs     01/09/23  1020   INR 1.0         No results for input(s): CKTOTAL, TROPONINI in the last 72 hours.     Chronic labs:  Lab Results   Component Value Date    CHOL 143 01/10/2023    TRIG 246 (H) 01/10/2023    HDL 29 01/10/2023    [address] [facility] 65 01/10/2023    TSH 5.190 (H) 01/10/2023    INR 1.0 01/09/2023    LABA1C 5.8 (H) 01/10/2023       Radiology:  Imaging studies reviewed today. Normal left ventricular size and systolic function. Ejection fraction is visually estimated at 60-65%. Grade II diastolic dysfunction. No regional wall motion abnormalities seen. Normal left ventricular wall thickness. Moderately dilated right ventricle with normal function. Biatrial dilation. Mild mitral regurgitation. Agitated saline injected for shunt evaluation. No evidence of atrial level shunt. No intracardiac mass or thrombus. No cardiac source of embolism identified. ASSESSMENT:    Principal Problem:    Cerebrovascular accident (CVA) due to occlusion of left middle cerebral artery (HCC)  Active Problems:    Paroxysmal atrial fibrillation ([address]  Resolved Problems:    * No resolved hospital problems. *     Acute CVA with left parietal lobe CVA  Left M3 occlusion  Hypertensive emergency  History of DVT  History of bladder cancer with solitary kidney  Atrial fibrillation not on anticoagulation due to bladder CA        PLAN:  Acute ischemic stroke admitted to ICU-transfer to intermediate care  Apixaban  High intensity statin-- LDL well controlled  monitor blood pressures   Monitor neuro status  Critical care consult appreciated  Neurology Consult appreciated . -Discussed case  MRI brain + left parietal ischemic stroke  echocardiogram with bubble study  CT brain perfusion with concern for left parietal lobe CVA. check A1c and lipid panel. PT OT and speech therapy consultation. A. fib with RVR today  Treated diltiazem drip  Metoprolol, apixaban- amio started  EP Consult appreciated  for history of A. fib --nuclear stress-low risk  -Has appointment at [facility] for eval for possible watchman versus ablation    Bladder CA  Follow up urology as OP  Urology Consult appreciated         Diet: ADULT DIET;  Regular  ADULT ORAL NUTRITION SUPPLEMENT; Breakfast, Dinner; Standard High Calorie/High Protein Oral Supplement  Code Status: Full Code  PT/OT Eval Status:     DVT Prophylaxis:     Recommended disposition at discharge: Anticipate home today  Outpatient referral to PT/OT/ST    +++++++++++++++++++++++++++++++++++++++++++++++++  Berenice Solitario MD   [facility].  +++++++++++++++++++++++++++++++++++++++++++++++++  NOTE: This report was transcribed using voice recognition software. Every effort was made to ensure accuracy; however, inadvertent computerized transcription errors may be present.

## 2023-01-12 NOTE — PROGRESS NOTES
Perfect serve message sent to Dr. Farrell Favre as well as Dr. Guzman Simmons as I do know who is following patient and wanted to clarify discharge instructions from their standpoint. Will wait for response.

## 2023-01-12 NOTE — CARE COORDINATION
Discussed request for Ryan Delaney however, pt is not homebound therefore O/P scripts for PT/OT/SLP written by attending and provided to pt at bedside; bedside RN Brunilda updated on the aforementioned. Addendum [email or web address]: Elquis free 30 day voucher called into pt's pharmacy [facility] in [place], approved, pt updated.

## 2023-01-12 NOTE — PROGRESS NOTES
[facility],2Nd Floor and [facility] Electrophysiology  Inpatient progress note   PATIENT: Vikas Richardson RECORD NUMBER: [id number]  DATE OF SERVICE:  1/12/2023  ATTENDING ELECTROPHYSIOLOGIST: Yelena Barba MD  PRIMARY ELECTROPHYSIOLOGIST: Yelena Barba MD  REFERRING PHYSICIAN: No ref. provider found and Natalia Ramirez MD  CHIEF COMPLAINT: Right hand clumsiness and slurred speech    HPI: This is a 65 y.o. male with a history of   Patient Active Problem List   Diagnosis    Cerebrovascular accident (CVA) due to occlusion of left middle cerebral artery ([address]    Paroxysmal atrial fibrillation ([facility]   who presented to the hospital on 1/9/23 complaining of right had clumsiness and slurred speech started at 6.30 AM of 1/9/23 when he woke up. He has history of paroxysmal atrial fibrillation, DVT left leg, PE, renal cell cancer status post left nephrectomy at [facility] on 12/2019, prostate cancer status post chemotherapy, testicular cancer status post removal, bladder cancer, recurrent hematuria and peripheral neuropathy. The patient states that he was diagnosed with paroxysmal atrial fibrillation 3 years ago and was prescribed Metoprolol Succinate which the dose has been increased gradually to currently at 200 mg daily. He reports worsening of palpitations lately and can lasted up to 3 to 4 hours at time. He was prescribed Lovenox, Xarelto and Eliquis in the past for [address] but all cause recurrent hematuria so he stopped using [address] for the past 1.5 years. The patient states that he woke up at 4.30 yesterday and felt fine and he went back to bed. When he woke up at 6.30 AM, he noticed right hand clumsiness and slurred speech. He presented to ED and was diagnosed with acute ischemic L M3 CVA which was out of tPA window. He was admitted to ICU for observation. The patient is currently in NSR. He was seen by Neurology.  Cardiac electrophysiology service is consulted for PAF and CVA.    1/11/23: patient up in chair. He went into afib this am with RVR, stable in chair with stable BP. Feels palpitations but no acute distress. 1/12/2023: Patient sitting up in bed and feels overall good. Wants to go home. Patient does have increased trouble with speech and word finding with anxiety. No other neurodeficits noted. He converted this morning at around 7:39 AM to sinus rhythm after 3.6-second conversion pause from AF. Overnight he was AF with controlled ventricular rate. Agreeable to start amiodarone today as patient does not want to stay for 6 doses of Tikosyn.       Patient Active Problem List    Diagnosis Date Noted    Paroxysmal atrial fibrillation ([facility] 01/10/2023     Priority: Medium    Cerebrovascular accident (CVA) due to occlusion of left middle cerebral artery ([facility] 01/09/2023     Priority: Medium       Past Medical History:   Diagnosis Date    Anemia     Cancer of kidney ([facility]     left    DVT of leg (deep venous thrombosis) ([facility]     right    Prostate cancer ([facility]     Solitary kidney     Testicular cancer ([facility] [facility]        Family History   Problem Relation Age of Onset    Hypertension Father        Social History     Tobacco Use    Smoking status: Former     Packs/day: 0.50     Years: 25.00     Pack years: 12.50     Types: Cigarettes     Quit date: 12/2/2019     Years since quitting: 3.1    Smokeless tobacco: Never   Substance Use Topics    Alcohol use: Not Currently       Current Facility-Administered Medications   Medication Dose Route Frequency Provider Last Rate Last Admin    metoprolol succinate (TOPROL XL) extended release tablet 50 mg  50 mg Oral BID KARINA Fitch - CNP   50 mg at 01/12/23 7993    amiodarone (CORDARONE) tablet 200 mg  200 mg Oral BID KARINA Fitch - CNP   200 mg at 01/12/23 0918    apixaban (ELIQUIS) tablet 5 mg  5 mg Oral BID KARINA Fitch - CNP   5 mg at 01/12/23 0918    ipratropium-albuterol (DUONEB) nebulizer solution 1 ampule  1 ampule Inhalation Q4H PRN Rhea L Hernandez, DO        pantoprazole (PROTONIX) tablet 40 mg  40 mg Oral QAM AC Amado Vergara MD   40 mg at 01/12/23 0648    traMADol (ULTRAM) tablet 50 mg  50 mg Oral Q6H PRN Amado Vergara MD        gabapentin (NEURONTIN) capsule 600 mg  600 mg Oral Daily Amado Vergara MD   600 mg at 01/12/23 0918    atorvastatin (LIPITOR) tablet 80 mg  80 mg Oral Nightly Amado Vergara MD   80 mg at 01/11/23 2139    [Held by provider] labetalol (NORMODYNE;TRANDATE) injection 10 mg  10 mg IntraVENous Q10 Min PRN Amado Vergara MD        sodium chloride flush 0.9 % injection 5-40 mL  5-40 mL IntraVENous 2 times per day Noris Dessert, DO   10 mL at 01/12/23 0918    sodium chloride flush 0.9 % injection 5-40 mL  5-40 mL IntraVENous PRN Rhea L Hernandez, DO        0.9 % sodium chloride infusion   IntraVENous PRN Rhea L Hernandez, DO        ondansetron (ZOFRAN-ODT) disintegrating tablet 4 mg  4 mg Oral Q8H PRN Rhea L Hernandez, DO        Or    ondansetron (ZOFRAN) injection 4 mg  4 mg IntraVENous Q6H PRN Rhea L Hernandez, DO        polyethylene glycol (GLYCOLAX) packet 17 g  17 g Oral Daily PRN Noris Dessert, DO   17 g at 01/11/23 2139    acetaminophen (TYLENOL) tablet 650 mg  650 mg Oral Q6H PRN Noris Dessert, DO   650 mg at 01/10/23 8105    Or    acetaminophen (TYLENOL) suppository 650 mg  650 mg Rectal Q6H PRN Rhea L Hernandez, DO        perflutren lipid microspheres (DEFINITY) injection 1.5 mL  1.5 mL IntraVENous ONCE PRN Rhea L Hernandez, DO            Allergies   Allergen Reactions    Augmentin [Amoxicillin-Pot Clavulanate]      ROS:   Constitutional: Negative for fever, activity change and appetite change. HENT: Negative for epistaxis. Eyes: Negative for diploplia, blurred vision. Respiratory: Negative for cough, chest tightness, shortness of breath and wheezing.    Cardiovascular: pertinent positives in HPI  Gastrointestinal: Negative for abdominal pain and blood in stool. All other review of systems are negative     PHYSICAL EXAM:   Vitals:    01/11/23 2045 01/12/23 0230 01/12/23 0748 01/12/23 0815   BP: 112/86 103/63 102/64    Pulse: 83 67 (!) 48 61   Resp: 16  18    Temp: 97.7 °F (36.5 °C)  97.5 °F (36.4 °C)    TempSrc: Oral      SpO2:   98%    Weight:       Height:          Constitutional: Well-developed, no acute distress  Eyes: conjunctivae normal, no xanthelasma   Ears, Nose, Throat: oral mucosa moist, no cyanosis   CV: no JVD. Tachycardic and irregular rate and rhythm. Normal S1S2 and no S3. No murmurs, rubs, or gallops. PMI is nondisplaced  Lungs: clear to auscultation bilaterally, normal respiratory effort without used of accessory muscles  Abdomen: soft, non-tender, bowel sounds present, no masses or hepatomegaly   Musculoskeletal: no digital clubbing, no edema   Skin: warm, no rashes     I have personally reviewed the laboratory, cardiac diagnostic and radiographic testing as outlined below:    Data:    Recent Labs     01/10/23  0407 01/11/23  0438 01/12/23  0614   WBC 7.1 8.3 7.9   HGB 12.1* 13.0 13.9   HCT 34.8* 38.2 40.8    196 206     Recent Labs     01/10/23  0407 01/11/23  0438 01/12/23  0614    139 140   K 4.1 4.1 4.3    103 104   CO2 22 25 21*   BUN 18 17 19   CREATININE 1.2 1.1 1.2   CALCIUM 8.7 9.4 9.6      Lab Results   Component Value Date/Time    MG 2.2 01/12/2023 06:14 AM     Recent Labs     01/10/23  0006   TSH 5.190*     No results for input(s): INR in the last 72 hours. CXR 1/10/23:   FINDINGS:   The lungs are without acute focal process. There is no effusion or   pneumothorax. The cardiomediastinal silhouette is without acute process. The   osseous structures are without acute process. Impression   No acute process. Telemetry 01/12/23 : Afib with controlled ventricular rate overnight, conversion to sinus rhythm this morning around 7:39 AM with 3.6-second conversion pause from AF to sinus rhythm.     EKG 1/10/23: NSR 66 bpm, RBBB, LAFB, , Qtc 463 ms. Please see scan in Cardiology. Echocardiogram 10/9/20:   Findings      Left Ventricle   Left ventricle size is normal.   Mild concentric left ventricular hypertrophy. Normal left ventricular systolic function. Ejection fraction is visually estimated at 55-60%. Indeterminate diastolic function. Right Ventricle   Normal right ventricular size and function (TAPSE 1.9 cm). Left Atrium   Normal sized left atrium by volume index. Right Atrium   Normal sized right atrium. Mitral Valve   Mild mitral regurgitation. No evidence of hemodynamically significant mitral stenosis. Tricuspid Valve   Mild tricuspid regurgitation. PASP is estimated at 20 mmHg (normal estimated PASP). Aortic Valve   No evidence of hemodynamically significant aortic regurgitation or   stenosis. Pulmonic Valve   The pulmonic valve was not well visualized. Pericardial Effusion   No evidence of a hemodynamically significant pericardial effusion. Aorta   Aortic root dimension within normal limits. Conclusions      Summary   Cardiac rhythm: atrial fibrillation with episodes of RVR   Normal left ventricular systolic function. Ejection fraction is visually estimated at 55-60%. Mild concentric left ventricular hypertrophy. Normal right ventricular size and function (TAPSE 1.9 cm). Indeterminate diastolic function. Mild mitral regurgitation. Mild tricuspid regurgitation. PASP is estimated at 20 mmHg (normal estimated PASP). Signature      ----------------------------------------------------------------   Electronically signed by Case Tim MD(Interpreting   physician) on 10/13/2020 06:46 AM   ----------------------------------------------------------------      1/10/23 NM stress   Impression   1. ECG during the infusion did not change. 2.  The myocardial perfusion imaging was normal without ischemia or   infarct.    3. Overall left ventricular systolic function was normal without   regional wall motion abnormalities. 4.  No transient ischemic dilatation. 5. Low risk general pharmacologic stress test.       Thank you for sending your patient to this [facility]. I have independently reviewed all of the ECGs and rhythm strips per above     Assessment/Plan: This is a 65 y.o. male with a history of   Patient Active Problem List   Diagnosis    Cerebrovascular accident (CVA) due to occlusion of left middle cerebral artery (HCC)    Paroxysmal atrial fibrillation ([address]    who presents with CVA and has history of PAF. 1. Paroxysmal atrial fibrillation.  - Reported history, with RVR 1/11/23. Has never had a DCCV in the past and no AAD prior. States the episodes have lasted a few hours at home in the past, but usually subside on own. Back to SR on the monitor this am 1/12/23   - [id number] = 2 (CVA)  - Current OAC regimen to start Eliquis today if ok with urology, he has not been on [facility] PTA recently due to recurrent bladder masses with hematuria. - Current medical therapy includes Toprol XL. He states he has been on Xarelto, eliquis in the past with hematuria and stopped due to this in the past   - Previous attempts at rhythm control have been none  - Had an extensive discussion regarding options between rate and rhythm control and the patient has chosen rhythm control  - For rhythm control we have chosen to utilize AAD therapy which depends on stress test and echo results. - Had an extensive discussion regarding all secondary causes of AF which include but are not limited to the following and then need for lifestyle modifications and stringent control of contributing medical conditions which include  - offered tikosyn and patient defferred prolonged admission for 6 doses , will start amiodarone today as he converted to SR this am 1/12/23           2. Bifascicular block  - RBBB and LAFB.   - Since at least 9/3/20 per available EKG. - No high grade AV block seen. 3.  History of DVT left leg and PE  - Provoked. - Post nephrectomy. - treated with Eliquis. 4. Renal cell cancer   - Status post robotic left nephrectomy at [facility] on 12/2019.  - Found to have metastasis in the left ureter and bladder. 5. Prostate cancer   - Status post chemotherapy. 6. Testicular cancer   - Status post removal of right testis mid 1990's. - Status post chemo therapy. 7. Bladder cancer with recurrent hematuria on [address] [place] [address] Road  - On regular interval cystoscopic mass removal and chemotherapy with Gemcitabine every 3-6 month. 8. Peripheral neuropathy. 9. Acute ischemic L M3 CVA   - Out of tPA window. - CT brain perfusion showed ischemic penumbra in the left parietal lobe. - CTA head and neck showed left M3 occlusion.  - MRI brain showed no evidence of acute intracranial abnormality  - Neurology consulted. Recommendations:  Continue Toprol XL at 50 mg bid. Start Eliquis 5 mg bid if no contraindication when is OK by Neurology. Cardizem bolus and gtt today, if converts back to SR will consider adding amiodarone to maintain SR   Patient has established appointment with [facility] to discuss rhythm control as well as Watchman procedure next month. Start amiodarone 200 mg BID for 3 weeks and then daily; low loading dose due to pause on conversion and on metoprolol  Amiodarone discharge instructions  While taking amiodarone, thyroid function test and liver function tests checked every 3 to 4 months.   In addition, you should have annual pulmonary function tests, chest x-rays and yearly eye exams.         - Weight Loss: aggressive weight loss and regular moderate cardiopulmonary exercise to maintain BMI <27 with a loss of at least 10% of their current weight which is safely and adequately maintained            - Exercise: 30min 3-4x/week of at least moderate cardiopulmonary exercise up to 250 min/wk            - Blood pressure: target of <130/80mmHg            - Dyslipidemia: add a statin if LDL >100mg/dL            - Diabetes Mellitus: add Metformin if HbA1c >6.5%            - Obstructive sleep apnea: evaluate for and or treat with CPAP if AHI >30/hour            - Abstinence from alcohol and tobacco products  - Discussed the potential improvement in all atrial fibrillation therapies if diet/exercise and weight loss are achieved per above. Thank you for allowing me to participate in your patient's care. Please call me if there are any questions or concerns. KARINA Sweeney - CNP  Cardiac Electrophysiology  [facility]) Physicians  [facility] [place]: [place] [facility]  12:09 PM  1/12/2023     Attending Physician's Statement    Patient seen with the ANP. Agree with the findings, assessment and plan. Management plan was discussed. I have personally interviewed the patient, independently performed a focused cardiac examination, reviewed the pertinent laboratory and diagnostic testing with the patient and directly participated in the medical decision-making as noted above with the following additions:     Feeling better. Converted to NSR on 1/12/23 at 7.39 AM with 3.6 second pause on IV Cardizem and Toprol XL. Physical exam showed aphasic, no JVD, RRR, normal S1S2, no murmur, clear lungs bilaterally, no edema    Will discontinue IV Cardizem. Will decrease Toprol XL to 50 mg bid. Will start Amiodarone 200 mg bid loading dose. Continue Eliquis 5 mg bid. OK to discharge home and follow up in 1 week for EKG. I have spent a total of 50 minutes with the patient and the family reviewing the above stated recommendations.   And a total of >50% of that time involved face-to-face time providing counseling and/or coordination of care with the other providers, reviewing records/tests, counseling/education of the patient, ordering medications/tests/procedures, coordinating care, and documenting clinical information in the EHR.      Earlene Moyer MD  Cardiac Electrophysiology  [facility] Physicians  [facility] [place]: [place] [facility]rophys[facility]  6:58 PM  1/12/2023

## 2023-01-12 NOTE — DISCHARGE SUMMARY
Physician Discharge Summary     Patient ID:  Joy Germain  [id number]  65 y.o.  1960    Admit date: 1/9/2023    Discharge date and time:  1/12/2023    Discharge Diagnoses: Principal Problem:    Cerebrovascular accident (CVA) due to occlusion of left middle cerebral artery ([address]  Active Problems:    Paroxysmal atrial fibrillation ([address]  Resolved Problems:    * No resolved hospital problems. *      Consults: IP CONSULT TO NEUROLOGY  IP CONSULT TO INTERNAL MEDICINE  IP CONSULT TO ELECTROPHYSIOLOGY  IP CONSULT TO CRITICAL CARE  IP CONSULT TO DIETITIAN  IP CONSULT TO UROLOGY  TEST MOCK CON71    Procedures: See below    Hospital Course: Acute CVA with left parietal lobe CVA  Left M3 occlusion  Hypertensive emergency  History of DVT  History of bladder cancer with solitary kidney  Atrial fibrillation         PLAN:  Acute ischemic stroke admitted to ICU-transfer to intermediate care  Apixaban  High intensity statin-- LDL well controlled  monitor blood pressures   Monitor neuro status  Critical care consult appreciated  Neurology Consult appreciated . -Discussed case  MRI brain + left parietal ischemic stroke  echocardiogram with bubble study  CT brain perfusion with concern for left parietal lobe CVA. check A1c and lipid panel. PT OT and speech therapy consultation. A. fib with RVR yest-- NSR today  Treated diltiazem drip  Metoprolol, apixaban- amio started  EP Consult appreciated  for history of A. fib --nuclear stress-low risk  -Has appointment at [facility] for eval for possible watchman versus ablation    Bladder CA  Follow up urology as OP  Urology Consult appreciated     Discharge Exam:  See progress note from today    Condition:  Stable    Disposition: home    Patient Instructions:   Current Discharge Medication List        START taking these medications    Details   amiodarone (CORDARONE) 200 MG tablet Take 1 tablet by mouth 2 times daily for 21 days, THEN 1 tablet daily for 21 days.   Qty: 90 tablet, Refills: 1      apixaban (ELIQUIS) 5 MG TABS tablet Take 1 tablet by mouth 2 times daily  Qty: 60 tablet, Refills: 3           CONTINUE these medications which have CHANGED    Details   metoprolol succinate (TOPROL XL) 50 MG extended release tablet Take 1 tablet by mouth in the morning and at bedtime  Qty: 180 tablet, Refills: 1           CONTINUE these medications which have NOT CHANGED    Details   calcium carbonate (CALTRATE 600) 1500 (600 Ca) MG TABS tablet Take 600 mg by mouth daily      Multiple Vitamin (MULTI VITAMIN DAILY) TABS Take 1 tablet by mouth daily      atorvastatin (LIPITOR) 10 MG tablet Take 10 mg by mouth at bedtime      gabapentin (NEURONTIN) 300 MG capsule Take 600 mg by mouth 2 times daily.       omeprazole (PRILOSEC) 40 MG delayed release capsule Take 40 mg by mouth daily      vitamin D (CHOLECALCIFEROL) 50 MCG (2000 UT) TABS tablet Take 2,000 Units by mouth daily           STOP taking these medications       aspirin 81 MG EC tablet Comments:   Reason for Stopping:         traMADol (ULTRAM) 50 MG tablet Comments:   Reason for Stopping:         aspirin EC 81 MG EC tablet Comments:   Reason for Stopping:         enoxaparin (LOVENOX) 120 MG/0.8ML injection Comments:   Reason for Stopping:         calcium 600 MG TABS tablet Comments:   Reason for Stopping:             Activity: activity as tolerated  Diet: cardiac diet    Follow-up with 1wk PCP    Note that over 30 minutes was spent in preparing discharge papers, discussing discharge with patient, staff, consultants, medication review, arranging follow up, etc.    Signed:  Curtis Leos MD  1/12/2023  12:09 PM

## 2023-01-12 NOTE — PLAN OF CARE
Problem: Chronic Conditions and Co-morbidities  Goal: Patient's chronic conditions and co-morbidity symptoms are monitored and maintained or improved  1/12/2023 1404 by Nan Cox RN  Outcome: Adequate for Discharge  1/12/2023 1403 by Nan Cox RN  Outcome: Adequate for Discharge     Problem: Pain  Goal: Verbalizes/displays adequate comfort level or baseline comfort level  1/12/2023 1404 by Nan Cox RN  Outcome: Adequate for Discharge  1/12/2023 1403 by Nan Cox RN  Outcome: Adequate for Discharge     Problem: Discharge Planning  Goal: Discharge to home or other facility with appropriate resources  1/12/2023 1404 by Nan Cox RN  Outcome: Adequate for Discharge  1/12/2023 1403 by Nan Cox RN  Outcome: Adequate for Discharge     Problem: ABCDS Injury Assessment  Goal: Absence of physical injury  1/12/2023 1404 by Nan Cox RN  Outcome: Adequate for Discharge  1/12/2023 1403 by Nan Cox RN  Outcome: Adequate for Discharge     Problem: Safety - Adult  Goal: Free from fall injury  1/12/2023 1404 by Nan Cox RN  Outcome: Adequate for Discharge  1/12/2023 1403 by Nan Cox RN  Outcome: Adequate for Discharge     Problem: Nutrition Deficit:  Goal: Optimize nutritional status  1/12/2023 1404 by Nan Cox RN  Outcome: Adequate for Discharge  1/12/2023 1403 by Nan Cox RN  Outcome: Adequate for Discharge

## 2023-01-12 NOTE — DISCHARGE INSTRUCTIONS
[facility]  Patient Discharge Instructions    IMPORTANT PHONE NUMBERS:  [facility]:    - Normal business hours: [phone]    Amiodarone discharge instructions  You are being discharged on amiodarone. Initially, you will be on a higher dose to complete loading, amiodarone 200 mg twice daily for 3 weeks then take 200 mg daily. Your maintenance dose will be 200 mg orally once daily. While taking amiodarone, you will need to have thyroid function test and liver function tests checked every 3 to 4 months. In addition, you should have annual pulmonary function tests, chest x-rays and yearly eye exams. If you have recurrent atrial fibrillation with rapid rates, please call the office as number above. Continue anticoagulation with Eliquis 5 mg twice a day if questions, please call the office. Prescriptions have been sent to your local pharmacy. You can follow-up in the office in 4 to 6 weeks if you choose or continue to follow-up with already established appointments at the [facility] clinic. No driving until you follow up with neurologist in the stroke clinic.

## 2023-01-12 NOTE — PROGRESS NOTES
Patient had two 3 second pauses on monitor and converted to Sinus Kailyn Bauman. Rate currently in the mid 40's to 50's. Cardizem titrated from 5mg to 2.5mg/hr. EKG done and confirmed rhythm of sinus vern. Attempted to reach out to EP however perfect serve stating no one is on. Will try again at a later time. Jake Sin with EP notified of the above via perfect serve message.

## 2023-01-12 NOTE — PLAN OF CARE
Problem: Chronic Conditions and Co-morbidities  Goal: Patient's chronic conditions and co-morbidity symptoms are monitored and maintained or improved  Outcome: Adequate for Discharge     Problem: Discharge Planning  Goal: Discharge to home or other facility with appropriate resources  Outcome: Adequate for Discharge     Problem: Pain  Goal: Verbalizes/displays adequate comfort level or baseline comfort level  Outcome: Adequate for Discharge     Problem: ABCDS Injury Assessment  Goal: Absence of physical injury  Outcome: Adequate for Discharge     Problem: Safety - Adult  Goal: Free from fall injury  Outcome: Adequate for Discharge     Problem: Nutrition Deficit:  Goal: Optimize nutritional status  Outcome: Adequate for Discharge

## 2023-01-12 NOTE — PROGRESS NOTES
All discharge information reviewed with patient and patient's wife at this time including new medications and important follow up visits with urology, pcp, stroke clinic and EP. All questions were answered medications are ready for  at his pharmacy.

## 2023-01-16 ENCOUNTER — TELEPHONE (OUTPATIENT)
Dept: NON INVASIVE DIAGNOSTICS | Age: 63
End: 2023-01-16

## 2023-01-16 NOTE — TELEPHONE ENCOUNTER
The patient called in and stated that he needed a prior auth for Eliquis 5mg.  Called the pharmacy and got the information    ID: [id number]  BIN: [id number]  PRETTYN: PILAR  GRP: [id number] my meds told me that prior auth is not required for the drug eliquis 5mg

## 2023-01-23 ENCOUNTER — HOSPITAL ENCOUNTER (OUTPATIENT)
Dept: SPEECH THERAPY | Age: 63
Setting detail: THERAPIES SERIES
Discharge: HOME | End: 2023-01-23
Payer: COMMERCIAL

## 2023-01-23 PROCEDURE — 92523 SPEECH SOUND LANG COMPREHEN: CPT

## 2023-01-23 NOTE — PROGRESS NOTES
[address] [place] [address]  Outpatient Speech Therapy  Phone: [phone] Fax: [phone]     SPEECH/LANGUAGE PATHOLOGY  SPEECH/LANGUAGE/COGNITIVE EVALUATION   and PLAN OF CARE      PATIENT NAME:  Jamar Davenport  (male)     MRN:  [id number]    :  1960  (65 y.o.)  STATUS:  Outpatient clinic   TODAY'S DATE:  2023  REFERRING PROVIDER:   Xenia Mohs, MD  SPECIFIC PROVIDER ORDER: SLP eval and treat  Date of order:  23  EVALUATING THERAPIST: MINOO rOdonez    CERTIFICATION/RECERTIFICATION PERIOD: 2023 to 23  INSURANCE PROVIDER:  Payor: Chucho Duncan / Plan: Barney Haddad / Product Type: *No Product type* /    INSURANCE ID:  [id number] - ([facility])   [address] (if applicable):        CPT Codes  EVALUATION: 48215 Evaluation of Speech Sound Language Comprehension     60 Minutes     TREATMENT:  Requesting treatment authorization for  1 visits over 12 weeks focusing on the following CPT codes:      19811 Speech/Language Therapy     30 Minutes    REFERRING/TREATMENT DIAGNOSIS: Dysarthria [R47.1]   Cognitive communication deficit [R41.841]      SPEECH THERAPY  PLAN OF CARE   The speech therapy  POC is established based on physician order, speech pathology diagnosis and results of clinical assessment     SPEECH PATHOLOGY DIAGNOSIS:    Mild cognitive-linguistic deficits    Outpatient Speech Pathology intervention is recommended 1 time  per week for the above certification period. Conditions Requiring Skilled Therapeutic Intervention for speech, language and/or cognition    Dysarthria   Cognitive linguistic impairment  Decreased problem solving skills   Decreased thought organization    Specific Speech Therapy Interventions to Include:   Expressive language training   Therapeutic tasks for Cognition  Therapeutic exercises for dysarthria    Specific instructions for next treatment:      To initiate POC    SHORT/LONG TERM GOALS       ST Goal 1: Patient will answer open-ended with 80% accuracy, independently to improve receptive language skills and enhance functional communication abilities. ST Goal 2:  Patient will respond verbally to functional problem solving scenarios w/ 90% accuracy, independently to promote safety and independence in home environment. ST Goal 3: Patient will utilize external aids as trained and implemented in treatment (I.e. calendar, to-do list, setting reminders)  w/ 80% acc w/ minimal cues to enhance functional cognitive-linguistic skills. ST Goal 4: Patient will complete functional written language tasks (I.e. participating in reciprocal email/text conversations, filling out function forms) with 80% acc to promote independence and return to home/community living. ST Goal 5: Patient will state and demonstrate appropriate use of word-finding strategies as trained w/ 80% accuracy to improve expressive language skills in communication breakdowns and improve quality of life. ST Goal 6: Patient will improve speech intelligibility and increased articulatory precision via compensatory strategies to 100% intelligibility    LTG 1: Patient will be able to demonstrate improved cognitive-linguistic abilities to mild deficits w/ use of compensatory strategies as needed, in order to return to prior level of function. Patient stated goals: Agreed with above,     Rehabilitation Potential/Prognosis: good                  PRIOR LEVEL OF COGNITIVE LINGUISTIC SKILLS PER PATIENT/FAMILY REPORT:  Patient is a 66 y/o male referred to SLP services to further assess cognitive-linguistic abilities as well as motor speech abilities. Patient was independent w/ all aspects of daily living prior to his stroke on 1/9/23. Patient states he has some word-finding difficulties and some short-term memory difficulties.  Patient states he had difficulty with his speech initially, but that has improved since he has been home.     CLINICAL ASSESSMENT:  MOTOR SPEECH       Oral Peripheral Examination   Adequate lingual/labial strength     Parameters of Speech Production  Respiration:  Adequate for speech production  Articulation:  Minimal Distortion  Resonance:  Within functional limits  Quality:   Within functional limits  Pitch: Within functional limits  Intensity: Within functional limits  Fluency:  Intact  Prosody Intact    RECEPTIVE LANGUAGE    Comprehension of Yes/No Questions: Within functional limits    Process  Simple Verbal Commands:   Within functional limits  Process Intermediate Verbal Commands:   Within functional limits  Process Complex Verbal Commands:     Within functional limits    Comprehension of Conversation:      Impaired      EXPRESSIVE LANGUAGE     Serials: Functional    Imitation:  Words   Functional   Sentences Functional    Naming:  (Modality used:  Verbal)  Confrontation Naming  Functional  Functional Description  Functional  Response Naming: Functional    Conversation:      Anomia was present    COGNITION     Attention/Orientation  Attention: Sustained attention   Orientation:  Oriented to Person, Place, Date    Formalized assessment of cognition was completed via the Assessment of Language-Related Functional Activities (PRAMOD). This assessment measures the patient's ability to complete functional cognitive-language activities of daily living. An independent functioning rating of 1 indicates a high probability of independent functioning on that task. A score of 2 indicates the need for some level of assistance on that task. A score of 3 indicates a high probability that the patient is not able to function independently on that task assessed. Results of the assessment will be indicated below. SUBTEST PERCENT CORRECT INDEPENDENT FUNCTIONING RATING   1. Telling Time 70 2   2. Counting Money 100 1   3. Addressing an Envelope 90 1   4. Daily Math Problems 100 1   5.   Writing a Check Balancing a Checkbook 100 1   6. Understanding Medicine Labels 100 1   7. Understanding a Calendar 90 1   8. Reading Instructions 90 1   9. Using a Telephone 100 1   10. Writing Phone Messages 95 1        CLINICAL OBSERVATIONS NOTED DURING THE EVALUATION  Latent responses and Anomic errors were noted during structured and unstructured observation tasks. Patient speech intelligibility at word/phrase/sentence/conversational levels yielded minimal distortion. Patient to benefit from compensatory strategy training to improve speech intelligibility as needed. Patient was alert and cooperative throughout evaluation. Patient demonstrated mild cognitive-linguistic deficits w/ telling time. [facility] other areas assessed. Patient would benefit from continued SLP services to target problem solving tasks assoc w/ time mgmt, short-term memory recall tasks w/ use of compensatory strategies as needed, and targeting word-finding  w/ use of compensatory strategies as needed in order to return to [facility]. EDUCATION:   The Speech Language Pathologist (SLP) completed education regarding results of evaluation and that intervention is warranted at this time. Learner: Patient  Education: Reviewed results and recommendations of this evaluation  Evaluation of Education:  Ronald Michaud understanding    This plan may be re-evaluated and revised as warranted. Treatment goals discussed with Patient   The Patient understand(s) the diagnosis, prognosis and plan of care     Evaluation Time includes thorough review of current medical information, gathering information on past medical history/social history and prior level of function, completion of standardized testing/informal observation of tasks, assessment of data and education on plan of care and goals. The admitting diagnosis and active problem list, as listed below have been reviewed prior to initiation of this evaluation.         ACTIVE PROBLEM LIST:   Patient Active Problem List   Diagnosis    Cerebrovascular accident (CVA) due to occlusion of left middle cerebral artery (HCC)    Paroxysmal atrial fibrillation ([facility]       Adam Brewer M.S., CCC-SLP  Speech-Language Pathologist  [address]. [id number]        [address].     Phone: [phone]     If you have any questions or concerns, please don't hesitate to call. Thank you for your referral.    Physician/Provider Signature:________________________________Date:__________________  By signing above, the therapists plan is approved by the physician/provider. All patients under [email or web address] must be signed by physician/provider.

## 2023-02-02 ENCOUNTER — OFFICE VISIT (OUTPATIENT)
Dept: NON INVASIVE DIAGNOSTICS | Age: 63
End: 2023-02-02
Payer: COMMERCIAL

## 2023-02-02 VITALS
SYSTOLIC BLOOD PRESSURE: 122 MMHG | HEART RATE: 50 BPM | DIASTOLIC BLOOD PRESSURE: 74 MMHG | HEIGHT: 71 IN | WEIGHT: 256.8 LBS | RESPIRATION RATE: 18 BRPM | BODY MASS INDEX: 35.95 KG/M2

## 2023-02-02 DIAGNOSIS — I63.512 CEREBROVASCULAR ACCIDENT (CVA) DUE TO OCCLUSION OF LEFT MIDDLE CEREBRAL ARTERY (HCC): ICD-10-CM

## 2023-02-02 DIAGNOSIS — Z90.5 H/O UNILATERAL NEPHRECTOMY: ICD-10-CM

## 2023-02-02 DIAGNOSIS — I48.0 PAROXYSMAL ATRIAL FIBRILLATION (HCC): Primary | ICD-10-CM

## 2023-02-02 DIAGNOSIS — Z79.01 ANTICOAGULATED: ICD-10-CM

## 2023-02-02 DIAGNOSIS — Z85.51 HISTORY OF BLADDER CANCER: ICD-10-CM

## 2023-02-02 DIAGNOSIS — Z79.899 ON AMIODARONE THERAPY: ICD-10-CM

## 2023-02-02 PROCEDURE — 93000 ELECTROCARDIOGRAM COMPLETE: CPT | Performed by: NURSE PRACTITIONER

## 2023-02-02 PROCEDURE — 99214 OFFICE O/P EST MOD 30 MIN: CPT | Performed by: NURSE PRACTITIONER

## 2023-02-02 RX ORDER — METOPROLOL SUCCINATE 50 MG/1
50 TABLET, EXTENDED RELEASE ORAL 2 TIMES DAILY
Qty: 180 TABLET | Refills: 3 | Status: SHIPPED | OUTPATIENT
Start: 2023-02-02

## 2023-02-02 RX ORDER — AMIODARONE HYDROCHLORIDE 200 MG/1
200 TABLET ORAL DAILY
Qty: 90 TABLET | Refills: 1 | Status: SHIPPED | OUTPATIENT
Start: 2023-02-02 | End: 2023-05-03

## 2023-02-02 NOTE — PROGRESS NOTES
[address]  [place] [address] [facility] Electrophysiology  Outpatient Progress Note  Tone Jackson  1960  Date of Service: 2/2/2023  PCP: Romana President, MD  Cardiologist: Dr Kelsi Lindo   Electrophysiologist: Dr. Tj Vicente         Subjective: Tone Jackson is seen for follow-up and management of: Atrial fibrillation    Last seen as inpatient with Dr. Tj Vicente on 1/12/2023    PMH as noted below significant for CVA, atrial fibrillation, DVT/PE, bladder cancer, prostate cancer, testicular cancer and renal cancer he is post testicle removal and left nephrectomy. He underwent left nephrectomy at [facility] in December 2019. In January 2023 he was diagnosed with acute CVA. He had not been on anticoagulation due to recurrent hematuria due to his bladder cancer. During the hospitalization for his stroke, EP was consulted for atrial fibrillation with RVR. He was started on Eliquis and put on a Cardizem drip. He did convert to sinus rhythm. He was not in agreement to start class III AAD as he did not want to stay in the hospital any longer. He was started on amiodarone for short-term use for antiarrhythmic therapy. He is scheduled for watchman procedure evaluation in [facility] clinic at the end of February. He is here for follow-up today as he would like to follow locally for his EP issues. He was taking amiodarone 200 mg twice a day for 3 weeks since discharge from the hospital and today is his last day of twice daily dosing. He is in sinus bradycardia on his EKG today. He is on metoprolol 50 mg twice a day and tolerating this well. He was previously on metoprolol 100 mg twice daily.     Patient Active Problem List   Diagnosis    Cerebrovascular accident (CVA) due to occlusion of left middle cerebral artery (HCC)    Paroxysmal atrial fibrillation (HCC)       Current Outpatient Medications   Medication Sig Dispense Refill    amiodarone (CORDARONE) 200 MG tablet Take 1 tablet by mouth daily 90 tablet 1    metoprolol succinate (TOPROL XL) 50 MG extended release tablet Take 1 tablet by mouth in the morning and at bedtime 180 tablet 3    apixaban (ELIQUIS) 5 MG TABS tablet Take 1 tablet by mouth 2 times daily 60 tablet 3    calcium carbonate 1500 (600 Ca) MG TABS tablet Take 600 mg by mouth daily      Multiple Vitamin (MULTI VITAMIN DAILY) TABS Take 1 tablet by mouth daily      atorvastatin (LIPITOR) 10 MG tablet Take 10 mg by mouth at bedtime      gabapentin (NEURONTIN) 300 MG capsule Take 600 mg by mouth 2 times daily. omeprazole (PRILOSEC) 40 MG delayed release capsule Take 40 mg by mouth daily      vitamin D (CHOLECALCIFEROL) 50 MCG (2000 UT) TABS tablet Take 2,000 Units by mouth daily       No current facility-administered medications for this visit. Allergies   Allergen Reactions    Augmentin [Amoxicillin-Pot Clavulanate]        ROS:   Constitutional: Negative for fever, activity change and appetite change. HENT: Negative for epistaxis. Eyes: Negative for diploplia, blurred vision. Respiratory: Negative for cough, chest tightness, shortness of breath and wheezing. Cardiovascular: pertinent positives in HPI  Gastrointestinal: Negative for abdominal pain and blood in stool. All other review of systems are negative     PHYSICAL EXAM:      Vitals:    02/02/23 1111   BP: 122/74   Pulse: 50   Resp: 18   Weight: 256 lb 12.8 oz (116.5 kg)   Height: 5' 11\" (1.803 m)     Constitutional: well-developed, no acute distress  Eyes: conjunctivae normal, no xanthelasma   Ears, Nose, Throat: oral mucosa moist, no cyanosis   CV: no JVD. Regular rate and rhythm. Normal S1S2 and no S3. No murmurs, rubs, or gallops.  PMI is nondisplaced  Lungs: clear to auscultation bilaterally, normal respiratory effort without used of accessory muscles  Abdomen: soft, non-tender, bowel sounds present, no masses or hepatomegaly   Musculoskeletal: no digital clubbing, no edema   Skin: warm, no rashes Data:    No results for input(s): WBC, HGB, HCT, PLT in the last 72 hours. No results for input(s): NA, K, CL, CO2, BUN, CREATININE, GLU, CALCIUM in the last 72 hours. Invalid input(s):  MAGNESIUM  Lab Results   Component Value Date/Time    MG 2.2 01/12/2023 06:14 AM     No results for input(s): TSH in the last 72 hours. No results for input(s): INR in the last 72 hours. EKG:  Please see scan in Cardiology. Sinus  Bradycardia rate of 51 bpm, BB, LAFB      Nuclear stress test 1/10/2023  Impression     1. ECG during the infusion did not change. 2.  The myocardial perfusion imaging was normal without ischemia or   infarct. 3.  Overall left ventricular systolic function was normal without   regional wall motion abnormalities. 4.  No transient ischemic dilatation. 5. Low risk general pharmacologic stress test.       Echocardiogram: 1/10/2023    Summary   Normal left ventricular size and systolic function. Ejection fraction is visually estimated at 60-65%. Grade II diastolic dysfunction. No regional wall motion abnormalities seen. Normal left ventricular wall thickness. Moderately dilated right ventricle with normal function. Biatrial dilation. Mild mitral regurgitation. Agitated saline injected for shunt evaluation. No evidence of atrial level shunt. No intracardiac mass or thrombus. No cardiac source of embolism identified. Signature      ----------------------------------------------------------------   Electronically signed by Hill Martinez MD      Assessment and plan:    1. Paroxysmal atrial fibrillation.  - Reported history, with RVR 1/11/23. Has never had a DCCV in the past and no AAD prior amiodarone in January 2022.  -  States the episodes have lasted a few hours at home in the past, but usually subside on own.  Back to SR on the monitor this am 1/12/23   - [id number] = 2 (CVA)  - Current OAC regimen to start Eliquis for watchman at [facility] clinic at the end of this month. 2. Bifascicular block  - RBBB and LAFB. - Since at least 9/3/20 per available EKG. - No high grade AV block seen. 3.  History of DVT left leg and PE  - Provoked. - Post nephrectomy. - treated with Eliquis. 4. Renal cell cancer   - Status post robotic left nephrectomy at [facility] on 12/2019.  - Found to have metastasis in the left ureter and bladder. 5. Prostate cancer   - Status post chemotherapy. 6. Testicular cancer   - Status post removal of right testis mid 1990's. - Status post chemo therapy. 7. Bladder cancer with recurrent hematuria on [address]; without bleeding on Eliquis since he left the hospital in January  - On regular interval cystoscopic mass removal and chemotherapy with Gemcitabine every 3-6 month. 8. Peripheral neuropathy. 9. Acute ischemic L M3 CVA   - Out of tPA window. - CT brain perfusion showed ischemic penumbra in the left parietal lobe. - CTA head and neck showed left M3 occlusion.  - MRI brain showed no evidence of acute intracranial abnormality  - Neurology consulted. Recommendations:  Continue Toprol XL at 50 mg bid. Continue Eliquis 5 mg bid; patient denies any further hematuria at this time since starting Eliquis  Patient has established appointment with [facility] to discuss rhythm control as well as Watchman procedure at the end of this month  Continue amiodarone 200 mg daily   5. Follow-up in 6 months or sooner if symptoms arise. Re-education on importance of well controlled HTN (goal BP < 130/80), adequate weight control (goal BMI of < 27), physical activity consisting of moderate cardiopulmonary exercise up to a goal of 250 min/wk, smoking/tobacco abstinence and limited ETOH intake. I have spent a total of 40 minutes with the patient and the family reviewing the above stated recommendations.  And a total of >50% of that time involved face-to-face time providing counseling and or coordination of care with the other providers. Thank you for allowing me to participate in your patient's care. Please call me if there are any questions or concerns.         KARINA Diez - FAYE  Cardiac Electrophysiology  [facility]) Physicians  [facility] [place]: [facility]  4:14 PM  2/2/2023

## 2023-02-03 ENCOUNTER — TELEPHONE (OUTPATIENT)
Dept: ADMINISTRATIVE | Age: 63
End: 2023-02-03

## 2023-02-03 NOTE — TELEPHONE ENCOUNTER
The referral was faxed to our office with a diagnosis of AFIB. Patient is known to EP and information was given to them.

## 2023-02-03 NOTE — TELEPHONE ENCOUNTER
There is a referral in the [place] [facility] on Dr. Elmo Dumas pt per Dr. Garner  dx: Afib. Was last seen in [address] by Dl Davidson on: 9/14/20. Of note, pt was seen in EP yesterday by Benji COLLINS. Scheduling advise please.

## 2023-02-06 ENCOUNTER — HOSPITAL ENCOUNTER (OUTPATIENT)
Dept: SPEECH THERAPY | Age: 63
Setting detail: THERAPIES SERIES
Discharge: HOME | End: 2023-02-06
Payer: COMMERCIAL

## 2023-02-06 ENCOUNTER — OFFICE VISIT (OUTPATIENT)
Dept: NEUROLOGY | Age: 63
End: 2023-02-06
Payer: COMMERCIAL

## 2023-02-06 ENCOUNTER — TELEPHONE (OUTPATIENT)
Dept: NON INVASIVE DIAGNOSTICS | Age: 63
End: 2023-02-06

## 2023-02-06 ENCOUNTER — TELEPHONE (OUTPATIENT)
Dept: CARDIOLOGY CLINIC | Age: 63
End: 2023-02-06

## 2023-02-06 VITALS
DIASTOLIC BLOOD PRESSURE: 81 MMHG | HEIGHT: 71 IN | HEART RATE: 53 BPM | SYSTOLIC BLOOD PRESSURE: 128 MMHG | WEIGHT: 257 LBS | RESPIRATION RATE: 18 BRPM | OXYGEN SATURATION: 96 % | BODY MASS INDEX: 35.98 KG/M2 | TEMPERATURE: 97.5 F

## 2023-02-06 DIAGNOSIS — I63.9 ACUTE ISCHEMIC STROKE (HCC): Primary | ICD-10-CM

## 2023-02-06 PROCEDURE — 92507 TX SP LANG VOICE COMM INDIV: CPT

## 2023-02-06 PROCEDURE — 99214 OFFICE O/P EST MOD 30 MIN: CPT | Performed by: PHYSICIAN ASSISTANT

## 2023-02-06 NOTE — PROGRESS NOTES
SPEECH LANGUAGE PATHOLOGY  DAILY PROGRESS NOTE      SUBJECTIVE: Patient seen for 30 minutes of therapy targeting speech/lang. Patient was pleasant and cooperative. OBJECTIVE:  ST Goal 1: Patient will answer open-ended with 80% accuracy, independently to improve receptive language skills and enhance functional communication abilities  Goal targeted this session Yes [x] No []   Results: Patient achieved 80% accuracy  with min verbal cues     ST Goal 2:  Patient will respond verbally to functional problem solving scenarios w/ 90% accuracy, independently to promote safety and independence in home environment. Goal targeted this session Yes [x] No    Results:  Patient achieved 80% accuracy  with min verbal cues     ST Goal 3: Patient will utilize external aids as trained and implemented in treatment (I.e. calendar, to-do list, setting reminders)  w/ 80% acc w/ minimal cues to enhance functional cognitive-linguistic skills. Goal targeted this session Yes [] No [x]   Results:     ST Goal 4: Patient will complete functional written language tasks (I.e. participating in reciprocal email/text conversations, filling out function forms) with 80% acc to promote independence and return to home/community living. Goal targeted this session Yes [x] No []   Results: Patient achieved 90% accuracy  with occ verbal cues     ST Goal 5: Patient will state and demonstrate appropriate use of word-finding strategies as trained w/ 80% accuracy to improve expressive language skills in communication breakdowns and improve quality of life. Goal targeted this session Yes [] No [x]     ST Goal 6: Patient will improve speech intelligibility and increased articulatory precision via compensatory strategies to 100% intelligibility  Goal targeted this session Yes [x] No []   Results: Patient achieved 100% accuracy  with occ verbal cues     Home Program: SLP provided patient w/ list of word-finding strategies. ASSESSMENT:  Making progress towards therapy goals. Patient completed functional problem solving/time mgmt task w/ 80% acc w/ min verbal cues required. Patient able to answer open-ended questions w/ 80% acc w/ min verbal cues required. Patient completed functional written language task of min complexity w/ 90% acc w/ occ verbal cues required. Patient overall speech intelligibility was 100% at conversational level. Patient educated on use of slow rate, over articulation, and increased volume as needed to improve speech intelligibility.          PLAN OF CARE:  Will continue speech pathology intervention as per established POC      Alanna Runner, M.S., [address] [facility] Pathologist  [place] [phone]

## 2023-02-06 NOTE — TELEPHONE ENCOUNTER
Patient called requesting to follow with Dr. Abel Santana. His wife follows with Dr. Abel Santana. He has known AFIB, already follows with EP and is going to be seen at [facility] to discuss Watchman. He could not tell me if he has any other cardiac history, CAD, etc.  Please advise.

## 2023-02-06 NOTE — PROGRESS NOTES
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 He presented with aphasia and R side weakness. His initial NIH score in the ED was 4. CTA Head/neck showed left M3 occlusion; CT brain perfusion showed concern for ischemic penumbra in the left parietal lobe. He was given loading doses of clopidogrel 300mg and aspirin 325mg. He had not been on [address] due to bleeding following regular bladder procedures for his bladder CA. He was scheduled to see someone in [place] about a watchman procedure. Since leaving the hospital, he was been doing well on Eliquis and statin. No bleeding issues. He is scheduled for watchman procedure evaluation on 2/23/23. Review of systems was otherwise unremarkable.     Surgical History:   Past Surgical History:   Procedure Laterality Date    COLONOSCOPY      SHOULDER SURGERY      TESTICLE REMOVAL Right     WISDOM TOOTH EXTRACTION        Family History:  Family History   Problem Relation Age of Onset    Hypertension Father      Social History:  Social History     Tobacco Use    Smoking status: Former     Packs/day: 0.50     Years: 25.00     Pack years: 12.50     Types: Cigarettes     Quit date: 12/2/2019     Years since quitting: 3.1    Smokeless tobacco: Never   Vaping Use    Vaping Use: Never used   Substance Use Topics    Alcohol use: Not Currently    Drug use: Never        Current Medications:      Current Outpatient Medications   Medication Sig Dispense Refill    amiodarone (CORDARONE) 200 MG tablet Take 1 tablet by mouth daily 90 tablet 1    metoprolol succinate (TOPROL XL) 50 MG extended release tablet Take 1 tablet by mouth in the morning and at bedtime 180 tablet 3    apixaban (ELIQUIS) 5 MG TABS tablet Take 1 tablet by mouth 2 times daily 60 tablet 3    calcium carbonate 1500 (600 Ca) MG TABS tablet Take 600 mg by mouth daily      Multiple Vitamin (MULTI VITAMIN DAILY) TABS Take 1 tablet by mouth daily      atorvastatin (LIPITOR) 10 MG tablet Take 10 mg by mouth at bedtime      gabapentin (NEURONTIN) 300 MG capsule Take 600 mg by mouth 2 times daily. omeprazole (PRILOSEC) 40 MG delayed release capsule Take 40 mg by mouth daily      vitamin D (CHOLECALCIFEROL) 50 MCG (2000 UT) TABS tablet Take 2,000 Units by mouth daily       No current facility-administered medications for this visit. Allergies: Allergies   Allergen Reactions    Augmentin [Amoxicillin-Pot Clavulanate]         Physical Examination  Vitals   Vitals:    02/06/23 1019   BP: 128/81   Pulse: 53   Resp: 18   Temp: 97.5 °F (36.4 °C)   TempSrc: Temporal   SpO2: 96%   Weight: 257 lb (116.6 kg)   Height: 5' 11\" (1.803 m)      This was an elderly gentleman in no acute distress, who was alert, cooperative and oriented. His skin was unremarkable, with no lymphadenopathy. Head examination was unrevealing. His neck was supple, without thyromegaly; There was full range of motion of his neck. His lungs are clear to auscultation. His limbs displayed no abnormalities. Neurologic Examination    Mental Status  Alert, and oriented to person, place and time. Speech was fluent with intact comprehension. There was no evidence of memory impairment. Attention and concentration were normal.     Cranial Nerves  II. Visual fields full to confrontation bilaterally. III, IV, VI: Pupils equally round and reactive to light, 3 to 2 mm bilaterally. EOMs: full, no nystagmus. V. Facial sensation intact to light touch bilaterally  VII: Facial movements symmetric and strong  VIII: Hearing intact to voice  IX,X: Palate elevated symmetrically. No dysarthria  XI: Sternocleidomastoid and trapezius 5/5 bilaterally   XII: Tongue was midline    Motor     Right Left   Right Left   Deltoid 5 5  Hip Flexion 5 5   Biceps      5  5  Knee Extension 5 5   Triceps 5 5  Knee Flexion 5 5   Handgrip 5 5  Ankle Dorsiflexion 5 5       Ankle Plantarflexion 5 5     There were normal tone and bulk. There was no pronator drift. Sensory     LT normal  Vibration decreased ankles b/l     Coordination     Finger-nose heel shin testings were performed well. Rapid alternating movements and fine movements were intact. Gait    He walked well. Labs    Lab Results   Component Value Date    WBC 7.9 01/12/2023    HGB 13.9 01/12/2023    HCT 40.8 01/12/2023    MCV 85.5 01/12/2023     01/12/2023     Lab Results   Component Value Date     01/12/2023    K 4.3 01/12/2023     01/12/2023    CO2 21 (L) 01/12/2023    BUN 19 01/12/2023    CREATININE 1.2 01/12/2023    GLUCOSE 109 (H) 01/12/2023    CALCIUM 9.6 01/12/2023    PROT 6.8 01/12/2023    LABALBU 4.0 01/12/2023    BILITOT 0.3 01/12/2023    ALKPHOS 68 01/12/2023    AST 31 01/12/2023    ALT 19 01/12/2023    LABGLOM >60 01/12/2023    GFRAA >60 09/03/2020       Lab Results   Component Value Date    LDLCALC 65 01/10/2023     Hemoglobin A1C   Date Value Ref Range Status   01/10/2023 5.8 (H) 4.0 - 5.6 % Final       Imaging  MRI BRAIN WO CONTRAST   Final Result   No evidence of acute intracranial abnormality. No acute infarct seen. XR CHEST PORTABLE   Final Result   No acute process.          CT Head W/O Contrast   Final Result   Diffuse atrophy likely age related   Findings compatible with small vessel ischemic changes. Findings were called to time of dictation to Dr. Willian Jacobsen   Final Result   1. Estimated stenosis of the proximal right and left internal carotid   artery by NASCET criteria is not hemodynamically significant   2. Mild atherosclerotic disease . 3. Left M3 occlusion. CTA NECK W CONTRAST   Final Result   1. Estimated stenosis of the proximal right and left internal carotid   artery by NASCET criteria is not hemodynamically significant   2. Mild atherosclerotic disease . 3. Left M3 occlusion. CT BRAIN PERFUSION   Final Result      Findings compatible with ischemic penumbra in the left parietal lobe               NM Cardiac Stress Test Nuclear Imaging    (Results Pending)                                   MRI findings show evidence of ischemic changes in the left insular cortex and temporal lobe.     All labs and imaging studies were reviewed independently today    Electronically signed by COMPA Gonzalez on 2/6/2023 at 10:28 AM

## 2023-02-06 NOTE — TELEPHONE ENCOUNTER
ID: [id number]  RxBIN: [id number]   RxGRP: COBSEG  RxPCN: [id number]    Medication: Eliquis 5mg BID    Dx: I48.0 - Paroxysmal Atrial Fibrillation    Bao MEIER    Pharmacy: [facility] [address] ph: [phone]    pending

## 2023-02-13 ENCOUNTER — HOSPITAL ENCOUNTER (OUTPATIENT)
Dept: SPEECH THERAPY | Age: 63
Setting detail: THERAPIES SERIES
Discharge: HOME | End: 2023-02-13
Payer: COMMERCIAL

## 2023-02-13 PROCEDURE — 92507 TX SP LANG VOICE COMM INDIV: CPT

## 2023-02-13 NOTE — PROGRESS NOTES
SPEECH LANGUAGE PATHOLOGY  DAILY PROGRESS NOTE      SUBJECTIVE: Patient seen for 30 minutes of therapy targeting speech/lang. Patient was pleasant and cooperative. OBJECTIVE:  ST Goal 1: Patient will answer open-ended with 80% accuracy, independently to improve receptive language skills and enhance functional communication abilities  Goal targeted this session Yes [x] No []   Results: Patient achieved 80% accuracy  with min verbal cues     ST Goal 2:  Patient will respond verbally to functional problem solving scenarios w/ 90% accuracy, independently to promote safety and independence in home environment. Goal targeted this session Yes [x] No    Results:  Patient achieved 80% accuracy  with min verbal cues     ST Goal 3: Patient will utilize external aids as trained and implemented in treatment (I.e. calendar, to-do list, setting reminders)  w/ 80% acc w/ minimal cues to enhance functional cognitive-linguistic skills. Goal targeted this session Yes [x] No []   Results: Patient achieved 70% accuracy  with min verbal cues     ST Goal 4: Patient will complete functional written language tasks (I.e. participating in reciprocal email/text conversations, filling out function forms) with 80% acc to promote independence and return to home/community living. Goal targeted this session Yes [x] No []   Results: Patient achieved 90% accuracy  with occ verbal cues     ST Goal 5: Patient will state and demonstrate appropriate use of word-finding strategies as trained w/ 80% accuracy to improve expressive language skills in communication breakdowns and improve quality of life.     Goal targeted this session Yes [x] No []   verbal cues    ST Goal 6: Patient will improve speech intelligibility and increased articulatory precision via compensatory strategies to 100% intelligibility  Goal targeted this session Yes [x] No []   Results: Patient achieved 100% accuracy  with occ verbal cues     Home Program: SLP provided patient w/ list of word-finding strategies. ASSESSMENT:  Making progress towards therapy goals. Patient completed functional problem solving/time mgmt task w/ 80% acc w/ min verbal cues required. Patient able to answer open-ended questions w/ 80% acc w/ min verbal cues required. Patient completed functional written language task of min complexity w/ 70% acc w/ min verbal cues required. Patient overall speech intelligibility was 100% at conversational level. Patient educated on use of slow rate, over articulation, and increased volume as needed to improve speech intelligibility. Patient reported forgetting briefly how to get around [facility] when he was there with his wife last week. Stated it \"only lasted a minute and then I was able to figure out where to go. \" He also reports occasional difficulty remembering where to turn when driving to familiar locations. SLP recommends patient f/u with his physician regarding these instances. Patient verbalized understanding and agreement.          PLAN OF CARE:  Will continue speech pathology intervention as per established POC      Katelin Avila M.S., [address] Pathologist  [place] [phone]

## 2023-03-06 ENCOUNTER — HOSPITAL ENCOUNTER (OUTPATIENT)
Dept: SPEECH THERAPY | Age: 63
Setting detail: THERAPIES SERIES
Discharge: HOME | End: 2023-03-06
Payer: COMMERCIAL

## 2023-03-06 ENCOUNTER — APPOINTMENT (OUTPATIENT)
Dept: SPEECH THERAPY | Age: 63
End: 2023-03-06
Payer: COMMERCIAL

## 2023-03-06 PROCEDURE — 92507 TX SP LANG VOICE COMM INDIV: CPT

## 2023-03-06 NOTE — PROGRESS NOTES
SPEECH LANGUAGE PATHOLOGY  DAILY PROGRESS NOTE      SUBJECTIVE: Patient seen for 30 minutes of therapy targeting speech/lang. Patient was pleasant and cooperative. OBJECTIVE:  ST Goal 1: Patient will answer open-ended with 80% accuracy, independently to improve receptive language skills and enhance functional communication abilities  Goal targeted this session Yes [x] No []   Results: Patient achieved 90% accuracy  independently    ST Goal 2:  Patient will respond verbally to functional problem solving scenarios w/ 90% accuracy, independently to promote safety and independence in home environment. Goal targeted this session Yes [x] No    Results:  Patient achieved 90% accuracy, independently    ST Goal 3: Patient will utilize external aids as trained and implemented in treatment (I.e. calendar, to-do list, setting reminders) w/ 80% acc w/ minimal cues to enhance functional cognitive-linguistic skills. Goal targeted this session Yes [x] No []   Results: Patient achieved 80% accuracy  with occ verbal cues     ST Goal 4: Patient will complete functional written language tasks (I.e. participating in reciprocal email/text conversations, filling out function forms) with 80% acc to promote independence and return to home/community living. Goal targeted this session Yes [] No [x]   Results:     ST Goal 5: Patient will state and demonstrate appropriate use of word-finding strategies as trained w/ 80% accuracy to improve expressive language skills in communication breakdowns and improve quality of life.     Goal targeted this session Yes [x] No []   Results: Patient achieved 80% accuracy  with occ verbal cues     ST Goal 6: Patient will improve speech intelligibility and increased articulatory precision via compensatory strategies to 100% intelligibility- GOAL MET  Goal targeted this session Yes [x] No []   Results: Patient achieved 100% accuracy  independently     Home Program: SLP provided patient w/ list of word-finding strategies. ASSESSMENT:  Making progress towards therapy goals. Patient completed functional problem solving task w/ 90% acc w/ min verbal cues required. Patient able to answer open-ended questions w/ 90% acc w/ min verbal cues required. Patient overall speech intelligibility was 100% at conversational level, goal met. Patient educated on use of slow rate, over articulation, and increased volume as needed to improve speech intelligibility. Patient has returned to work and reports that he has been doing well overall. Occasional difficulty with short-term memory/recall, but utilizes a calendar or list to assist with this. Patient also reports it \"takes a little longer\" when typing emails or messaging co-workers, but he prefers to take his time and proof read his messages for errors before sending them. Patient reporting he is feeling almost back to baseline. Will re-assess cognitive-linguistic abilities next session. Patient encouraged to continue use of calendar and lists as needed to improve recall and to cont w/ taking his time and proof reading messages and emails.        PLAN OF CARE:  Will continue speech pathology intervention as per established POC      Tom Corea M.S., [address] Pathologist  [facility] [phone]

## 2023-03-10 RX ORDER — AMIODARONE HYDROCHLORIDE 200 MG/1
TABLET ORAL
Qty: 90 TABLET | Refills: 1 | Status: SHIPPED | OUTPATIENT
Start: 2023-03-10

## 2023-03-13 ENCOUNTER — HOSPITAL ENCOUNTER (OUTPATIENT)
Dept: SPEECH THERAPY | Age: 63
Setting detail: THERAPIES SERIES
Discharge: HOME | End: 2023-03-13
Payer: COMMERCIAL

## 2023-03-13 PROCEDURE — 92507 TX SP LANG VOICE COMM INDIV: CPT

## 2023-03-13 NOTE — PROGRESS NOTES
[address] [place] [address]  Outpatient Speech Therapy  Phone: [phone] Fax: [phone]      SPEECH THERAPY   DISCHARGE SUMMARY    Date of Report: 3/13/2023    Patient Name:Luis Carlos Keller  : 1960  MRN: [id number]    Diagnosis: Dysarthria [R47.1]   Cognitive communication deficit [R41.841]  Referring Physician: Tahir Gu MD and Enola Holter, MD    SUBJECTIVE  Patient attended 4 speech therapy sessions from 23 to 3/13/23 , with 0  cancellations and 0 refusals. Focus of current treatment sessions has been on speech/language/cognition. OBJECTIVE / GOAL STATUS   (Status Key: GM = Goal Met, MP = Making Progress, BP = Beginning Progress, NI = Not Introduced, D/C = Discontinue Goal, NM = Not Met)      ST Goal 1: Patient will answer open-ended with 80% accuracy, independently to improve receptive language skills and enhance functional communication abilities. -Results: Patient achieved 90% accuracy  independently- GOAL MET     ST Goal 2:  Patient will respond verbally to functional problem solving scenarios w/ 90% accuracy, independently to promote safety and independence in home environment. Results:  Patient achieved 90% accuracy, independently-GOAL MET     ST Goal 3: Patient will utilize external aids as trained and implemented in treatment (I.e. calendar, to-do list, setting reminders)  w/ 80% acc w/ minimal cues to enhance functional cognitive-linguistic skills. Results: Patient achieved 90% accuracy  with occ verbal cues- GOAL MET     ST Goal 4: Patient will complete functional written language tasks (I.e. participating in reciprocal email/text conversations, filling out function forms) with 80% acc to promote independence and return to home/community living. Results: Patient achieved 90% accuracy, independently.  -GOAL MET     ST Goal 5: Patient will state and demonstrate appropriate use of word-finding strategies as trained w/ 80% accuracy to improve expressive language skills in communication breakdowns and improve quality of life. Results: Patient achieved 90% accuracy, independently- GOAL MET     ST Goal 6: Patient will improve speech intelligibility and increased articulatory precision via compensatory strategies to 100% intelligibility- GOAL MET 3/6/23   Results: Patient achieved 100% accuracy,  independently     LTG 1: Patient will be able to demonstrate improved cognitive-linguistic abilities to mild deficits w/ use of compensatory strategies as needed, in order to return to prior level of function. -GOAL MET    ASSESSMENT / STANDARDIZED TEST RESULTS  Patient has made excellent  progress over the past 4 sessions. Patient completed functional problem solving task w/ 90% acc w/ min verbal cues required. Patient able to answer open-ended questions w/ 90% acc w/ min verbal cues required. Patient overall speech intelligibility was 100% at conversational level, goal met. Patient educated on use of slow rate, over articulation, and increased volume as needed to improve speech intelligibility. Patient has returned to work and reports that he has been doing well overall. Occasional difficulty with short-term memory/recall, but utilizes a calendar or list to assist with this. Patient continues to take his time and proof read his messages and emails for errors before sending them . Patient reporting he is feeling  back to baseline. Patient encouraged to continue use of calendar and lists as needed to improve recall and to cont w/ taking his time and proof reading messages and emails. SLP completed post-treatment  Assessment of Language-Related Functional Activities (PRAMOD). This assessment measures the patient's ability to complete functional cognitive-language activities of daily living. An independent functioning rating of 1 indicates a high probability of independent functioning on that task.   A score of 2 indicates the need for some level of assistance on that task.  A score of 3 indicates a high probability that the patient is not able to function independently on that task assessed.  SLP completed post -treatment assessment for the telling time subtest as this was the only subtest to yield an independent functioning rating score of 2. All other areas assessed were a rating of 1, indicating high probability of independent functioning on that task. Results of the assessments will be indicated below.     Results from assessment completed on 1/23/23:  SUBTEST PERCENT CORRECT INDEPENDENT FUNCTIONING RATING   1.  Telling Time 70 2      Results from assessment completed on 3/13/23:  SUBTEST PERCENT CORRECT INDEPENDENT FUNCTIONING RATING   1.  Telling Time 100 1     Patient improved his independent functioning rating score from a 2 to a 1, indicating a high probability of independent functioning with that task.       RECOMMENDATIONS  Patient is discharged at this time d/t all goals met.     Patient and/or caregiver aware of diagnosis? Yes   Patient and/or caregiver agree with POC? Yes       Thank you for this referral.     Renee Gonzalez M.S., CCC-SLP  Speech-Language Pathologist  [place]. [phone]

## 2023-04-06 ENCOUNTER — OFFICE VISIT (OUTPATIENT)
Dept: CARDIOLOGY CLINIC | Age: 63
End: 2023-04-06
Payer: COMMERCIAL

## 2023-04-06 VITALS
SYSTOLIC BLOOD PRESSURE: 118 MMHG | WEIGHT: 260.4 LBS | RESPIRATION RATE: 18 BRPM | DIASTOLIC BLOOD PRESSURE: 78 MMHG | HEART RATE: 50 BPM | HEIGHT: 71 IN | BODY MASS INDEX: 36.46 KG/M2

## 2023-04-06 DIAGNOSIS — I48.0 PAROXYSMAL ATRIAL FIBRILLATION (HCC): Primary | ICD-10-CM

## 2023-04-06 PROCEDURE — 99203 OFFICE O/P NEW LOW 30 MIN: CPT | Performed by: INTERNAL MEDICINE

## 2023-04-06 PROCEDURE — 93000 ELECTROCARDIOGRAM COMPLETE: CPT | Performed by: INTERNAL MEDICINE

## 2023-04-06 NOTE — PROGRESS NOTES
twice daily for rate and rhythm control  Continue current dose of Eliquis 5 mg p.o. twice daily for anticoagulation. Continue low-dose atorvastatin 10 mg p.o. daily  Continue rest of the current medications  Follow-up at [facility] as scheduled on 4/17/2023 for Watchman device placement. Advised to get a sleep study from Dr. Pallavi Severino office due to underlying A-fib. Follow-up with me in 3 months. Thank you for allowing me to participate in your patient's care. Please feel free to contact me if you have any questions or concerns.     Lenora Sánchez MD  [address] Cardiology

## 2023-04-06 NOTE — PATIENT INSTRUCTIONS
Continue Hamiter on 200 mg p.o. daily along with Toprol-XL 50 mg p.o. twice daily for rate and rhythm control  Continue current dose of Eliquis 5 mg p.o. twice daily for anticoagulation. Low-dose atorvastatin 10 mg p.o. daily  Continue rest of the current medications  Follow-up with [facility] as scheduled for Watchman device placement. Advised to get a sleep study from Dr. Dior Stevens office due to underlying A-fib. Follow-up with me in 3 months.

## 2023-08-04 ENCOUNTER — OFFICE VISIT (OUTPATIENT)
Dept: CARDIOLOGY CLINIC | Age: 63
End: 2023-08-04
Payer: COMMERCIAL

## 2023-08-04 VITALS — WEIGHT: 263.1 LBS | BODY MASS INDEX: 36.83 KG/M2 | HEIGHT: 71 IN

## 2023-08-04 DIAGNOSIS — I48.0 PAROXYSMAL ATRIAL FIBRILLATION (HCC): Primary | ICD-10-CM

## 2023-08-04 PROCEDURE — 93000 ELECTROCARDIOGRAM COMPLETE: CPT | Performed by: INTERNAL MEDICINE

## 2023-08-04 PROCEDURE — 99214 OFFICE O/P EST MOD 30 MIN: CPT | Performed by: INTERNAL MEDICINE

## 2023-08-04 NOTE — PATIENT INSTRUCTIONS
Continue amiodarone 200 mg p.o. daily for short term use only and decrease Toprol-XL 25 mg p.o. twice daily  due to bradycardia. Continue ASA 81 mg po daily and agree with stopping Eliquis  Continue low-dose atorvastatin 10 mg p.o. daily  Continue rest of the current medications  Advised to get a sleep study from Dr. Castellon Reach office due to underlying A-fib. Follow-up with me in 6  months.

## 2023-08-04 NOTE — PROGRESS NOTES
OUTPATIENT CARDIOLOGY FOLLOW UP    Name: Bev Tapia    Age: 59 y.o. Date of Service: 8/4/23    Reason for Consultation:  Chief Complaint   Patient presents with    Atrial Fibrillation       Referring Physician: Nicholas Tony MD    History of Present Illness:  Bev Tapia is a 59 y.o. male who presents today for follow up of CAD and atrial fibrillation. PMH includes left renal cell cancer status post left nephrectomy underwent December 2019, low-grade bladder cancer diagnosed April 2020 on gemcitabine bladder irrigations and awaiting definitive therapy. Diagnosed with a stroke with minimal residual deficits on 1/9/2023 did not receive tPA due to late presentation. Patient was diagnosed with paroxysmal atrial fibrillation in September 2020 when he presented to bladder biopsy and subsequently transferred to [facility] for further evaluation. He spontaneously converted to sinus rhythm after receiving IV Cardizem. He also has history of prostate cancer and received  XRT 7 years ago, testicular cancer s/p surgery 1992 history of DVT/PE perioperatively after the left nephrectomy in December 2019 treated with Xarelto, chronic CKD, hyperlipidemia on statin, severe obesity with a BMI of 37. History of recurrent hematuria, underwent Watchman device placement on 4/17/2023 at [facility] and currently, he is off anticoagulation therapy. He also denies any chest pain, dyspnea, palpitation, dizziness lightheadedness syncope presyncope. He was seen in the office 4/6/2023, since last office visit, he denies any further hospitalizations or ER visits. He is routinely active with no complaints of chest pain, SOB, palpitations, lightheadedness, dizziness, or syncope. No history of PND or orthopnea. He is currently with no active cardiac complaints at rest. SR on EKG.         Review of Systems:  Cardiac: As per HPI  General: No fever, chills  Pulmonary: As per HPI  HEENT: No visual disturbances, difficult

## 2023-08-11 RX ORDER — METOPROLOL SUCCINATE 25 MG/1
25 TABLET, EXTENDED RELEASE ORAL 2 TIMES DAILY
COMMUNITY
End: 2023-08-11 | Stop reason: SDUPTHER

## 2023-08-11 RX ORDER — METOPROLOL SUCCINATE 25 MG/1
25 TABLET, EXTENDED RELEASE ORAL 2 TIMES DAILY
Qty: 180 TABLET | Refills: 3 | Status: SHIPPED | OUTPATIENT
Start: 2023-08-11

## 2023-10-26 ENCOUNTER — HOSPITAL ENCOUNTER (OUTPATIENT)
Age: 63
Discharge: HOME | End: 2023-10-26
Payer: COMMERCIAL

## 2023-10-26 LAB
ANION GAP SERPL CALCULATED.3IONS-SCNC: 10 MMOL/L (ref 7–16)
BUN SERPL-MCNC: 19 MG/DL (ref 6–23)
CALCIUM SERPL-MCNC: 9.4 MG/DL (ref 8.6–10.2)
CHLORIDE SERPL-SCNC: 104 MMOL/L (ref 98–107)
CO2 SERPL-SCNC: 24 MMOL/L (ref 22–29)
CREAT SERPL-MCNC: 1.3 MG/DL (ref 0.7–1.2)
EKG ATRIAL RATE: 56 BPM
EKG P AXIS: 56 DEGREES
EKG P-R INTERVAL: 168 MS
EKG Q-T INTERVAL: 484 MS
EKG QRS DURATION: 138 MS
EKG QTC CALCULATION (BAZETT): 467 MS
EKG R AXIS: -56 DEGREES
EKG T AXIS: 12 DEGREES
EKG VENTRICULAR RATE: 56 BPM
GFR SERPL CREATININE-BSD FRML MDRD: >60 ML/MIN/1.73M2
GLUCOSE SERPL-MCNC: 115 MG/DL (ref 74–99)
MAGNESIUM SERPL-MCNC: 2 MG/DL (ref 1.6–2.6)
POTASSIUM SERPL-SCNC: 4.3 MMOL/L (ref 3.5–5)
SODIUM SERPL-SCNC: 138 MMOL/L (ref 132–146)

## 2023-10-26 PROCEDURE — 80048 BASIC METABOLIC PNL TOTAL CA: CPT

## 2023-10-26 PROCEDURE — 93005 ELECTROCARDIOGRAM TRACING: CPT | Performed by: INTERNAL MEDICINE

## 2023-10-26 PROCEDURE — 83735 ASSAY OF MAGNESIUM: CPT

## 2023-10-26 PROCEDURE — 36415 COLL VENOUS BLD VENIPUNCTURE: CPT

## 2024-02-28 ENCOUNTER — OFFICE VISIT (OUTPATIENT)
Dept: CARDIOLOGY CLINIC | Age: 64
End: 2024-02-28

## 2024-02-28 VITALS
HEIGHT: 71 IN | DIASTOLIC BLOOD PRESSURE: 76 MMHG | WEIGHT: 262.9 LBS | BODY MASS INDEX: 36.81 KG/M2 | SYSTOLIC BLOOD PRESSURE: 118 MMHG | RESPIRATION RATE: 18 BRPM | HEART RATE: 56 BPM

## 2024-02-28 DIAGNOSIS — I48.0 PAROXYSMAL ATRIAL FIBRILLATION (HCC): Primary | ICD-10-CM

## 2024-02-28 RX ORDER — DOFETILIDE 0.25 MG/1
500 CAPSULE ORAL 2 TIMES DAILY
COMMUNITY

## 2024-02-28 NOTE — PROGRESS NOTES
surgery  History of prostate cancer s/p XRT and chemo  History of DVT/PE December 2019  Former smoker with 12.5-pack-year history of smoking  Severe obesity with a possible sleep apnea  Peripheral neuropathy  Moderate sleep apnea - 11/21/23        Plan:   Continue Tikosyn 250 mcg po twice a day and Toprol XL 25 mg po daily for rate and rhythm control  Continue ASA 81 mg po daily and agree with stopping Eliquis  Continue low-dose atorvastatin 10 mg p.o. daily  Continue rest of the current medications  Follow up with EP service at [facility] as scheduled  Advised to follow up with Dr. Titus regarding sleep test results.   Follow-up with me in 12  months.      Thank you for allowing me to participate in your patient's care. Please feel free to contact me if you have any questions or concerns.    Benito Mandujano MD  [facility] Cardiology

## 2024-02-28 NOTE — PATIENT INSTRUCTIONS
Continue Tikosyn 250 mcg po twice a day and Toprol XL 25 mg po daily for rate and rhythm control  Continue ASA 81 mg po daily and agree with stopping Eliquis  Continue low-dose atorvastatin 10 mg p.o. daily  Continue rest of the current medications  Follow up with EP service at [facility] as scheduled  Advised to follow up with Dr. Titus regarding sleep test results.   Follow-up with me in 12  months.

## 2024-03-11 ENCOUNTER — TELEPHONE (OUTPATIENT)
Dept: CARDIOLOGY CLINIC | Age: 64
End: 2024-03-11

## 2024-03-11 NOTE — TELEPHONE ENCOUNTER
Patient needs cardiac clearance for cystoscopy retrograde pyelogram bladder biopsy fulguration by Dr. Colunga on 4/25/24.  Recommended to hold Aspirin (7) days prior.  Patient seen in our office on 2/28/24.

## 2024-03-13 NOTE — TELEPHONE ENCOUNTER
Patient's revised cardiac risk index is low (1), class II risk, 0.9% risk of major perioperative cardiac events.  Currently, patient denies any active cardiac symptoms including chest pain, decompensated heart failure, uncontrolled arrhythmias or obstructive valve lesions.  No further cardiac workup is needed prior to cystoscopy and proceed with surgery as scheduled.  Patient is okay to hold aspirin for 7 days.

## 2024-04-15 ENCOUNTER — HOSPITAL ENCOUNTER (OUTPATIENT)
Age: 64
Discharge: HOME | End: 2024-04-15
Payer: COMMERCIAL

## 2024-04-15 LAB
BILIRUB UR QL STRIP: NEGATIVE
CLARITY UR: CLEAR
COLOR UR: YELLOW
COMMENT: NORMAL
GLUCOSE UR STRIP-MCNC: NEGATIVE MG/DL
HGB UR QL STRIP.AUTO: NEGATIVE
KETONES UR STRIP-MCNC: NEGATIVE MG/DL
LEUKOCYTE ESTERASE UR QL STRIP: NEGATIVE
NITRITE UR QL STRIP: NEGATIVE
PH UR STRIP: 5.5 [PH] (ref 5–9)
PROT UR STRIP-MCNC: NEGATIVE MG/DL
SP GR UR STRIP: 1.02 (ref 1–1.03)
UROBILINOGEN UR STRIP-ACNC: 0.2 EU/DL (ref 0–1)

## 2024-04-15 PROCEDURE — 87086 URINE CULTURE/COLONY COUNT: CPT

## 2024-04-16 LAB
MICROORGANISM SPEC CULT: NO GROWTH
SPECIMEN DESCRIPTION: NORMAL

## 2024-04-25 ENCOUNTER — HOSPITAL ENCOUNTER (OUTPATIENT)
Age: 64
Discharge: HOME | End: 2024-04-27

## 2024-04-29 LAB — SURGICAL PATHOLOGY REPORT: NORMAL

## 2024-06-19 ENCOUNTER — HOSPITAL ENCOUNTER (OUTPATIENT)
Age: 64
Discharge: HOME | End: 2024-06-19
Payer: COMMERCIAL

## 2024-06-19 LAB
ANION GAP SERPL CALCULATED.3IONS-SCNC: 13 MMOL/L (ref 7–16)
BUN SERPL-MCNC: 30 MG/DL (ref 6–23)
CALCIUM SERPL-MCNC: 9.5 MG/DL (ref 8.6–10.2)
CHLORIDE SERPL-SCNC: 102 MMOL/L (ref 98–107)
CO2 SERPL-SCNC: 24 MMOL/L (ref 22–29)
CREAT SERPL-MCNC: 1.6 MG/DL (ref 0.7–1.2)
EKG ATRIAL RATE: 72 BPM
EKG P AXIS: 54 DEGREES
EKG P-R INTERVAL: 148 MS
EKG Q-T INTERVAL: 440 MS
EKG QRS DURATION: 132 MS
EKG QTC CALCULATION (BAZETT): 481 MS
EKG R AXIS: -77 DEGREES
EKG T AXIS: 13 DEGREES
EKG VENTRICULAR RATE: 72 BPM
GFR, ESTIMATED: 49 ML/MIN/1.73M2
GLUCOSE SERPL-MCNC: 125 MG/DL (ref 74–99)
MAGNESIUM SERPL-MCNC: 1.8 MG/DL (ref 1.6–2.6)
POTASSIUM SERPL-SCNC: 4.3 MMOL/L (ref 3.5–5)
SODIUM SERPL-SCNC: 139 MMOL/L (ref 132–146)

## 2024-06-19 PROCEDURE — 36415 COLL VENOUS BLD VENIPUNCTURE: CPT

## 2024-06-19 PROCEDURE — 80048 BASIC METABOLIC PNL TOTAL CA: CPT

## 2024-06-19 PROCEDURE — 83735 ASSAY OF MAGNESIUM: CPT

## 2024-06-19 PROCEDURE — 93005 ELECTROCARDIOGRAM TRACING: CPT | Performed by: STUDENT IN AN ORGANIZED HEALTH CARE EDUCATION/TRAINING PROGRAM

## 2024-09-17 ENCOUNTER — HOSPITAL ENCOUNTER (OUTPATIENT)
Age: 64
Discharge: HOME | End: 2024-09-17
Payer: COMMERCIAL

## 2024-09-17 LAB
ANION GAP SERPL CALCULATED.3IONS-SCNC: 14 MMOL/L (ref 7–16)
BUN SERPL-MCNC: 16 MG/DL (ref 6–23)
CALCIUM SERPL-MCNC: 9.5 MG/DL (ref 8.6–10.2)
CHLORIDE SERPL-SCNC: 105 MMOL/L (ref 98–107)
CO2 SERPL-SCNC: 22 MMOL/L (ref 22–29)
CREAT SERPL-MCNC: 1.3 MG/DL (ref 0.7–1.2)
EKG ATRIAL RATE: 74 BPM
EKG P AXIS: 60 DEGREES
EKG P-R INTERVAL: 154 MS
EKG Q-T INTERVAL: 416 MS
EKG QRS DURATION: 132 MS
EKG QTC CALCULATION (BAZETT): 461 MS
EKG R AXIS: -77 DEGREES
EKG T AXIS: -3 DEGREES
EKG VENTRICULAR RATE: 74 BPM
GFR, ESTIMATED: 65 ML/MIN/1.73M2
GLUCOSE SERPL-MCNC: 94 MG/DL (ref 74–99)
MAGNESIUM SERPL-MCNC: 1.9 MG/DL (ref 1.6–2.6)
POTASSIUM SERPL-SCNC: 4.5 MMOL/L (ref 3.5–5)
SODIUM SERPL-SCNC: 141 MMOL/L (ref 132–146)

## 2024-09-17 PROCEDURE — 80048 BASIC METABOLIC PNL TOTAL CA: CPT

## 2024-09-17 PROCEDURE — 93010 ELECTROCARDIOGRAM REPORT: CPT | Performed by: INTERNAL MEDICINE

## 2024-09-17 PROCEDURE — 93005 ELECTROCARDIOGRAM TRACING: CPT | Performed by: INTERNAL MEDICINE

## 2024-09-17 PROCEDURE — 83735 ASSAY OF MAGNESIUM: CPT

## 2024-11-20 ENCOUNTER — OFFICE VISIT (OUTPATIENT)
Dept: BARIATRICS/WEIGHT MGMT | Age: 64
End: 2024-11-20
Payer: COMMERCIAL

## 2024-11-20 VITALS
WEIGHT: 258.4 LBS | SYSTOLIC BLOOD PRESSURE: 104 MMHG | TEMPERATURE: 97.7 F | HEIGHT: 70 IN | BODY MASS INDEX: 36.99 KG/M2 | HEART RATE: 88 BPM | DIASTOLIC BLOOD PRESSURE: 61 MMHG

## 2024-11-20 DIAGNOSIS — E78.2 MIXED HYPERLIPIDEMIA: Primary | ICD-10-CM

## 2024-11-20 DIAGNOSIS — E66.812 CLASS 2 SEVERE OBESITY DUE TO EXCESS CALORIES WITH SERIOUS COMORBIDITY AND BODY MASS INDEX (BMI) OF 37.0 TO 37.9 IN ADULT: ICD-10-CM

## 2024-11-20 DIAGNOSIS — E66.01 CLASS 2 SEVERE OBESITY DUE TO EXCESS CALORIES WITH SERIOUS COMORBIDITY AND BODY MASS INDEX (BMI) OF 37.0 TO 37.9 IN ADULT: ICD-10-CM

## 2024-11-20 PROCEDURE — 99204 OFFICE O/P NEW MOD 45 MIN: CPT | Performed by: INTERNAL MEDICINE

## 2024-11-20 PROCEDURE — 99203 OFFICE O/P NEW LOW 30 MIN: CPT

## 2024-11-20 RX ORDER — ASPIRIN 81 MG/1
TABLET ORAL
COMMUNITY
Start: 2023-04-10

## 2024-11-20 NOTE — PROGRESS NOTES
cystoscopy for bladder. 42 sessions of radiation for prostate ca.    Past Surgical History:   Procedure Laterality Date    COLONOSCOPY      SHOULDER SURGERY      TESTICLE REMOVAL Right     WISDOM TOOTH EXTRACTION       Prior to Admission medications    Medication Sig Start Date End Date Taking? Authorizing Provider   dofetilide (TIKOSYN) 250 MCG capsule Take 2 capsules by mouth 2 times daily    Brigid Alvarez MD   metoprolol succinate (TOPROL XL) 25 MG extended release tablet Take 1 tablet by mouth in the morning and at bedtime 8/11/23   Benito Mandujano MD   calcium carbonate 1500 (600 Ca) MG TABS tablet Take 1 tablet by mouth daily    Brigid Alvarez MD   Multiple Vitamin (MULTI VITAMIN DAILY) TABS Take 1 tablet by mouth daily    Brigid Alvarez MD   atorvastatin (LIPITOR) 10 MG tablet Take 1 tablet by mouth at bedtime    Brigid Alvarez MD   gabapentin (NEURONTIN) 600 MG tablet Take 1 tablet by mouth 2 times daily.    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 Toprol XL is once a day.    Allergies:   Allergies   Allergen Reactions    Augmentin [Amoxicillin-Pot Clavulanate]      Social history: smoking: Quit 2019 ; Alcohol: no , work:  - product - desk job.    ROS -  Card - no CP currently (none since Tikosyn)  GI - no N/V/D/C    PE -  Gen : /61 (Site: Left Upper Arm, Position: Sitting, Cuff Size: Thigh)   Pulse 88   Temp 97.7 °F (36.5 °C) (Temporal)   Ht 1.765 m (5' 9.5\")   Wt 117.2 kg (258 lb 6.4 oz)   BMI 37.61 kg/m²    WN, WD, NAD  Lung: Nml resp effort, CTA B/L  Heart:  irregularly irregular but rate is controlled w/o MGR, + LE pitting edema b/l  Psych: Normal mood   Full affect  Neuro: Moves all ext

## 2024-12-18 ENCOUNTER — HOSPITAL ENCOUNTER (OUTPATIENT)
Age: 64
Discharge: HOME | End: 2024-12-18
Payer: COMMERCIAL

## 2024-12-18 LAB
ANION GAP SERPL CALCULATED.3IONS-SCNC: 14 MMOL/L (ref 7–16)
BUN SERPL-MCNC: 24 MG/DL (ref 6–23)
CALCIUM SERPL-MCNC: 9.6 MG/DL (ref 8.6–10.2)
CHLORIDE SERPL-SCNC: 103 MMOL/L (ref 98–107)
CO2 SERPL-SCNC: 25 MMOL/L (ref 22–29)
CREAT SERPL-MCNC: 1.3 MG/DL (ref 0.7–1.2)
EKG ATRIAL RATE: 227 BPM
EKG Q-T INTERVAL: 382 MS
EKG QRS DURATION: 136 MS
EKG QTC CALCULATION (BAZETT): 487 MS
EKG R AXIS: -77 DEGREES
EKG T AXIS: -3 DEGREES
EKG VENTRICULAR RATE: 98 BPM
GFR, ESTIMATED: 61 ML/MIN/1.73M2
GLUCOSE SERPL-MCNC: 97 MG/DL (ref 74–99)
MAGNESIUM SERPL-MCNC: 1.8 MG/DL (ref 1.6–2.6)
POTASSIUM SERPL-SCNC: 4.1 MMOL/L (ref 3.5–5)
SODIUM SERPL-SCNC: 142 MMOL/L (ref 132–146)

## 2024-12-18 PROCEDURE — 36415 COLL VENOUS BLD VENIPUNCTURE: CPT

## 2024-12-18 PROCEDURE — 83735 ASSAY OF MAGNESIUM: CPT

## 2024-12-18 PROCEDURE — 93005 ELECTROCARDIOGRAM TRACING: CPT | Performed by: FAMILY MEDICINE

## 2024-12-18 PROCEDURE — 80048 BASIC METABOLIC PNL TOTAL CA: CPT

## 2024-12-18 PROCEDURE — 93010 ELECTROCARDIOGRAM REPORT: CPT | Performed by: INTERNAL MEDICINE

## 2025-01-09 ENCOUNTER — OFFICE VISIT (OUTPATIENT)
Dept: BARIATRICS/WEIGHT MGMT | Age: 65
End: 2025-01-09
Payer: COMMERCIAL

## 2025-01-09 VITALS
HEART RATE: 64 BPM | SYSTOLIC BLOOD PRESSURE: 102 MMHG | WEIGHT: 234.2 LBS | BODY MASS INDEX: 33.53 KG/M2 | TEMPERATURE: 96.9 F | DIASTOLIC BLOOD PRESSURE: 60 MMHG | HEIGHT: 70 IN

## 2025-01-09 DIAGNOSIS — E66.01 CLASS 2 SEVERE OBESITY DUE TO EXCESS CALORIES WITH SERIOUS COMORBIDITY AND BODY MASS INDEX (BMI) OF 37.0 TO 37.9 IN ADULT: ICD-10-CM

## 2025-01-09 DIAGNOSIS — E78.2 MIXED HYPERLIPIDEMIA: Primary | ICD-10-CM

## 2025-01-09 DIAGNOSIS — E66.812 CLASS 2 SEVERE OBESITY DUE TO EXCESS CALORIES WITH SERIOUS COMORBIDITY AND BODY MASS INDEX (BMI) OF 37.0 TO 37.9 IN ADULT: ICD-10-CM

## 2025-01-09 PROCEDURE — 99211 OFF/OP EST MAY X REQ PHY/QHP: CPT

## 2025-01-09 PROCEDURE — 99213 OFFICE O/P EST LOW 20 MIN: CPT | Performed by: INTERNAL MEDICINE

## 2025-01-09 NOTE — PATIENT INSTRUCTIONS
Rules:  Limit sweets to one day per month  Limit chips/crackers/pretzels/nuts/popcorn to 200 dain/day  Eliminate all sugar sweetened beverages (including fruit juice)  Limit restaurants (including fast food and food from a convenience store) to one time every two weeks while in town    Requirements:  Make sure protein intake is at least 100 grams per day   Make sure that fiber intake is at least 22 grams per day  Take one multivitamin every day    Targets:  Count every calorie every day  Limit calorie intake to 1650 calories/day, limit 2300 Dain/day to maintain  Walk 30 minutes daily  Avoid eating 2 hours within bedtime.     Tips:  Do not eat outside of the dining room or the kitchen  Do not eat while watching TV, videos, working on the computer or using a smart phone  Do not eat food out of a multi-serving bag or container.    Follow up with me as needed.

## 2025-01-09 NOTE — PROGRESS NOTES
intake is at least 22 grams per day  Take one multivitamin every day    Targets:  Count every calorie every day  Limit calorie intake to 1650 calories/day, limit 2300 Dain/day to maintain  Walk 30 minutes daily  Avoid eating 2 hours within bedtime.     Tips:  Do not eat outside of the dining room or the kitchen  Do not eat while watching TV, videos, working on the computer or using a smart phone  Do not eat food out of a multi-serving bag or container.    Follow up with me as needed.    Total time spent on encounter: 20 min.    Teddy Wade MD  Internal Medicine/Obesity Medicine  1/9/2025.

## 2025-03-17 ENCOUNTER — OFFICE VISIT (OUTPATIENT)
Dept: CARDIOLOGY CLINIC | Age: 65
End: 2025-03-17
Payer: COMMERCIAL

## 2025-03-17 VITALS
DIASTOLIC BLOOD PRESSURE: 68 MMHG | SYSTOLIC BLOOD PRESSURE: 130 MMHG | HEART RATE: 54 BPM | HEIGHT: 71 IN | BODY MASS INDEX: 30.97 KG/M2 | WEIGHT: 221.2 LBS | RESPIRATION RATE: 18 BRPM

## 2025-03-17 DIAGNOSIS — I48.0 PAROXYSMAL ATRIAL FIBRILLATION (HCC): ICD-10-CM

## 2025-03-17 DIAGNOSIS — I63.512 CEREBROVASCULAR ACCIDENT (CVA) DUE TO OCCLUSION OF LEFT MIDDLE CEREBRAL ARTERY (HCC): Primary | ICD-10-CM

## 2025-03-17 DIAGNOSIS — E78.00 PURE HYPERCHOLESTEROLEMIA: ICD-10-CM

## 2025-03-17 PROCEDURE — 93000 ELECTROCARDIOGRAM COMPLETE: CPT | Performed by: INTERNAL MEDICINE

## 2025-03-17 PROCEDURE — 99214 OFFICE O/P EST MOD 30 MIN: CPT | Performed by: INTERNAL MEDICINE

## 2025-03-17 RX ORDER — AMIODARONE HYDROCHLORIDE 200 MG/1
200 TABLET ORAL
COMMUNITY
Start: 2025-02-04

## 2025-03-17 RX ORDER — METOPROLOL SUCCINATE 25 MG/1
25 TABLET, EXTENDED RELEASE ORAL 2 TIMES DAILY
Qty: 180 TABLET | Refills: 3 | Status: SHIPPED | OUTPATIENT
Start: 2025-03-17

## 2025-03-17 RX ORDER — ATORVASTATIN CALCIUM 10 MG/1
10 TABLET, FILM COATED ORAL NIGHTLY
Qty: 90 TABLET | Refills: 3 | Status: SHIPPED | OUTPATIENT
Start: 2025-03-17

## 2025-03-17 NOTE — PROGRESS NOTES
OUTPATIENT CARDIOLOGY FOLLOW UP    Name: Luis Carlos Schneider    Age: 64 y.o.    Date of Service: 8/4/23    Reason for Consultation:  No chief complaint on file.      Referring Physician: Kacey Titus MD    History of Present Illness:  Luis Carlos Schneider is a 64 y.o. male who presents today for follow up of CAD and atrial fibrillation. PMH includes left renal cell cancer status post left nephrectomy underwent December 2019, low-grade bladder cancer diagnosed April 2020 on gemcitabine bladder irrigations and following with urology service.  Diagnosed with a stroke with minimal residual deficits on 1/9/2023 did not receive tPA due to late presentation.  Patient was diagnosed with paroxysmal atrial fibrillation in September 2020 when he presented for bladder biopsy and subsequently transferred to [facility] for further evaluation.  He spontaneously converted to sinus rhythm after receiving IV Cardizem.  He also has history of prostate cancer and received  XRT 7 years ago, testicular cancer s/p surgery, 1992 history of DVT/PE perioperatively after the left nephrectomy in December 2019 treated with Xarelto, chronic CKD, hyperlipidemia on statin, severe obesity with a BMI of 37.  History of recurrent hematuria, underwent Watchman device placement on 4/17/2023 at [facility] and currently, he is off anticoagulation therapy.    He also denies any chest pain, dyspnea, palpitations, dizziness lightheadedness syncope presyncope.  He was seen in the office 8/4/23, since last office visit, he was admitted in 10/23 for initiation of Tikosyn. He is off anticoagulation .  Currently he is taking Tikosyn 250 mcg p.o. twice daily.    He was seen in office on 2/28/2024, since last visit, no hospital or ER visits.  Compliant with medications.  Currently is off Tikosyn and it was not working.  He was initiated back on amiodarone for rhythm control with plans for cardioversion in the near future.  He is following at EP service at

## 2025-03-17 NOTE — PATIENT INSTRUCTIONS
Continue Amiodarone 200 mcg po day and Toprol XL 25 mg po twice daily for rate and rhythm control  Continue ASA 81 mg po daily and agree with stopping Eliquis  Continue low-dose atorvastatin 10 mg p.o. daily, advised to get fasting lipid profile  Continue rest of the current medications  Follow up with EP service at [facility] as scheduled  Sleep study inconclusive, no recommendations for C-PAP  Follow-up with me in 12  months.

## 2025-04-24 ENCOUNTER — TELEPHONE (OUTPATIENT)
Dept: CARDIOLOGY CLINIC | Age: 65
End: 2025-04-24

## 2025-04-24 NOTE — TELEPHONE ENCOUNTER
Per verbal from Dr. Mandujano, okay to hold Aspirin (7) days prior.    Patient notified of Dr. Mandujano's risk assessment/recommendations. Note faxed to Dr. Colunga [phone].

## 2025-04-24 NOTE — TELEPHONE ENCOUNTER
Patient's revised cardiac risk index is is low (1), class II risk, 6% risk of major perioperative cardiac events.  Currently, patient denies any active cardiac symptoms including chest pain, decompensated heart failure, uncontrolled arrhythmias or obstructive valve lesions.  No further cardiac workup is needed and proceed with bladder surgery as scheduled.  Continue Toprol-XL perioperatively.

## 2025-04-24 NOTE — TELEPHONE ENCOUNTER
Patient needs cardiac clearance for cystoscopy, retrograde pyelogram, bladder biopsy, fulgeration of bladder tumor by Dr. Colunga on 4/28/25 at [facility] under [facility].     Patient denies any chest pain, shortness of breath or palpitations since last visit in March 2025.  Patient is able to complete all activities of daily living, including; climbing one flight of stairs and walking one block without cardiac symptoms. Recommended to hold Aspirin (7) days prior.

## 2025-04-28 ENCOUNTER — HOSPITAL ENCOUNTER (OUTPATIENT)
Age: 65
Discharge: HOME | End: 2025-04-30

## 2025-04-29 ENCOUNTER — HOSPITAL ENCOUNTER (OUTPATIENT)
Age: 65
Discharge: HOME | End: 2025-04-29
Payer: COMMERCIAL

## 2025-04-29 LAB — PSA SERPL-MCNC: 0.25 NG/ML (ref 0–4)

## 2025-04-29 PROCEDURE — 84153 ASSAY OF PSA TOTAL: CPT

## 2025-04-29 PROCEDURE — 36415 COLL VENOUS BLD VENIPUNCTURE: CPT

## 2025-05-02 LAB — SURGICAL PATHOLOGY REPORT: NORMAL
